# Patient Record
Sex: FEMALE | Race: WHITE | Employment: OTHER | URBAN - NONMETROPOLITAN AREA
[De-identification: names, ages, dates, MRNs, and addresses within clinical notes are randomized per-mention and may not be internally consistent; named-entity substitution may affect disease eponyms.]

---

## 2017-01-27 ENCOUNTER — TRANSFERRED RECORDS (OUTPATIENT)
Dept: HEALTH INFORMATION MANAGEMENT | Facility: HOSPITAL | Age: 37
End: 2017-01-27

## 2017-07-08 ENCOUNTER — HEALTH MAINTENANCE LETTER (OUTPATIENT)
Age: 37
End: 2017-07-08

## 2017-07-20 ENCOUNTER — OFFICE VISIT (OUTPATIENT)
Dept: FAMILY MEDICINE | Facility: OTHER | Age: 37
End: 2017-07-20
Attending: NURSE PRACTITIONER
Payer: MEDICARE

## 2017-07-20 VITALS
WEIGHT: 126 LBS | BODY MASS INDEX: 23.42 KG/M2 | HEART RATE: 80 BPM | TEMPERATURE: 97.6 F | SYSTOLIC BLOOD PRESSURE: 100 MMHG | DIASTOLIC BLOOD PRESSURE: 60 MMHG | RESPIRATION RATE: 14 BRPM

## 2017-07-20 DIAGNOSIS — B35.4 RINGWORM OF BODY: Primary | ICD-10-CM

## 2017-07-20 DIAGNOSIS — L30.9 ECZEMA, UNSPECIFIED TYPE: ICD-10-CM

## 2017-07-20 PROCEDURE — 99212 OFFICE O/P EST SF 10 MIN: CPT

## 2017-07-20 PROCEDURE — 99213 OFFICE O/P EST LOW 20 MIN: CPT | Performed by: NURSE PRACTITIONER

## 2017-07-20 RX ORDER — KETOCONAZOLE 20 MG/G
CREAM TOPICAL 2 TIMES DAILY
Qty: 60 G | Refills: 1 | Status: SHIPPED | OUTPATIENT
Start: 2017-07-20 | End: 2018-01-04

## 2017-07-20 ASSESSMENT — ANXIETY QUESTIONNAIRES
3. WORRYING TOO MUCH ABOUT DIFFERENT THINGS: MORE THAN HALF THE DAYS
IF YOU CHECKED OFF ANY PROBLEMS ON THIS QUESTIONNAIRE, HOW DIFFICULT HAVE THESE PROBLEMS MADE IT FOR YOU TO DO YOUR WORK, TAKE CARE OF THINGS AT HOME, OR GET ALONG WITH OTHER PEOPLE: VERY DIFFICULT
5. BEING SO RESTLESS THAT IT IS HARD TO SIT STILL: SEVERAL DAYS
6. BECOMING EASILY ANNOYED OR IRRITABLE: SEVERAL DAYS
7. FEELING AFRAID AS IF SOMETHING AWFUL MIGHT HAPPEN: SEVERAL DAYS
1. FEELING NERVOUS, ANXIOUS, OR ON EDGE: MORE THAN HALF THE DAYS
2. NOT BEING ABLE TO STOP OR CONTROL WORRYING: SEVERAL DAYS
4. TROUBLE RELAXING: SEVERAL DAYS
GAD7 TOTAL SCORE: 9

## 2017-07-20 NOTE — MR AVS SNAPSHOT
After Visit Summary   7/20/2017    Milla Bose    MRN: [id number]           Patient Information     Date Of Birth          1980        Visit Information        Provider Department      7/20/2017 11:30 AM Aileen Walton NP [facility]        Today's Diagnoses     Ringworm of body    -  1    Eczema, unspecified type          Care Instructions      ASSESSMENT/PLAN:       1. Ringworm of body  symptomatic  - ketoconazole (NIZORAL) 2 % cream; Apply topically 2 times daily  Dispense: 60 g; Refill: 1    2. Eczema, unspecified type  Gentle body wash  CeraVe tub lotion        FUTURE APPOINTMENTS:       - Follow-up visit as needed    Aileen Walton NP  [facility]    Ringworm of the Skin    Ringworm is a fungal infection of the skin. Despite the name, a worm doesn't cause it. The cause of ringworm is a fungus that infects the outer layers of the skin. It is also not caused by bed bugs, scabies, or lice. These are totally different.  The medical term for ringworm is tinea. It can affect most parts of your body, although it seems to do better in moist areas of the body and around hair. It can be on almost any part of your body, including:    Arms, hands, legs, chest, feet, and back    Scalp    Beard    Groin    Between the toes  Depending on where it is located, sometimes the name changes:    Tinea capitis (scalp)    Tinea cruris (groin)    Tinea corporis (body)    Tinea pedis (feet)  Causes  Ringworm is very common all over the world, including the [place] It can take less than 1 week up to 2 weeks before you develop the infection after being exposed. So, you may not figure out the exact cause.  It is spread through direct contact with:    An infected person or animal    Infected soil, or objects such as towels, clothing, and enriquez  Symptoms  At first you might not notice ringworm. Or you may just see a small, red, often raised itchy spot or pimple. Sometimes  there may only be one spot. At other times there may be several. Ringworm can look slightly different on different parts of the body, but there are some things are always present:    Irregular, round, oval or ring-shaped, which is why it's called ringworm    Clearer or lighter color at the center, since it spreads from the center of the spot outward    Red or inflamed look    Raised    Itchy    Scaly, dry, or flaky  Home care  Follow these tips to help care for yourself at home:    Leave it alone. Don't scratch at the rash or pick it. This can increase the chance of infection and scarring.    Take medicine as prescribed. If you were prescribed a cream, apply it exactly as directed. Make sure to put the cream not just on the rash, but also on the skin 1 or 2 inches around it. Medicine by mouth is sometimes needed, particularly for ringworm on the scalp. Take it as directed and until your healthcare provider says to stop.    Keep it from spreading to others. Untreated ringworm of the skin is contagious by skin-to-skin contact. Your child may return to school 2 days after treatment has started.  Prevention  To some degree, prevention depends on what part of your body was affected. In general, the following good hygiene can help.    Clean up after you get dirty or sweaty, or after using a locker room.    When possible, don t share enriquez and brushes.    Avoid having your skin and feet wet or damp for long periods.    Wear clean, loose-fitting underwear.  Follow-up care  Follow up with your healthcare provider as advised by our staff if the rash does not improve after 10 days of treatment or if the rash spreads to other areas of the body.  When to seek medical advice  Call your healthcare provider right away if any of these occur:    Redness around the rash gets worse    Fluid drains from the rash    Fever of 100.4 F (38 C) or higher, or as directed by your healthcare provider  Date Last Reviewed: 8/1/2016 2000-2017  [employer]. [address] [place] [address], [place] [address]. All rights reserved. This information is not intended as a substitute for professional medical care. Always follow your healthcare professional's instructions.        Atopic Dermatitis (Adult)  Atopic dermatitis is a dry, itchy, red rash. It s also called eczema. The rash is chronic, or ongoing. It can come and go over time. The disease is often passed down in families. It causes a problem with the skin barrier that makes the skin more sensitive to the environment and other factors. The increased skin sensitivity causes an itch, which causes scratching. Scratching can worsen the itching or also break the skin. This can put the skin at risk of infection.  The condition is most common in people with asthma, hay fever, hives, or dry or sensitive skin. The rash may be caused by extreme heat or heavy sweating. Skin irritants can cause the rash to flare up. These can include wool or silk clothing, grease, oils, some medicines, and harsh soaps and detergents. Emotional stress can also be a trigger.  Treatment is done to relieve the itching and inflammation of the skin.  Home care  Follow these tips to care for your condition:    Keep the areas of rash clean by bathing at least every other day. Use lukewarm water to bathe. Don t use hot water, which can dry out the skin.    Don t use soaps with strong detergents. Use mild soaps made for sensitive skin.    Apply a cream or ointment to damp skin right after bathing.    Avoid things that irritate your skin. Wear absorbent, soft fabrics next to the skin rather than rough or scratchy materials.    Use mild laundry soap free of scents and perfumes. Make sure to rinse all the soap out of your clothes.    Treat any skin infection as directed.    Use oral diphenhydramine to help reduce itching. This is an antihistamine you can buy at drug and grocery stores. It can make you sleepy, so use lower doses during the  daytime. Or you can use loratadine. This is an antihistamine that will not make you sleepy. Do not use diphenhydramine if you have glaucoma or have trouble urinating due to an enlarged prostate.  Follow-up care  See your healthcare provider, or as advised. If your symptoms don t get better or if they get worse in the next 7 days, make an appointment with your healthcare provider.  When to seek medical advice  Call your healthcare provider right away  if any of these occur:    Increasing area of redness or pain in the skin    Yellow crusts or wet drainage from the rash    Fever of 100.4 F (38 C) or higher, or as directed by your healthcare provider  Date Last Reviewed: 9/1/2016 2000-2017 [employer]. [address] [place] [address], [place] [address]. All rights reserved. This information is not intended as a substitute for professional medical care. Always follow your healthcare professional's instructions.                Follow-ups after your visit        Who to contact     If you have questions or need follow up information about today's clinic visit or your schedule please contact [facility] directly at [phone].  Normal or non-critical lab and imaging results will be communicated to you by MyChart, letter or phone within 4 business days after the clinic has received the results. If you do not hear from us within 7 days, please contact the clinic through [employer] ([email or web address])t or phone. If you have a critical or abnormal lab result, we will notify you by phone as soon as possible.  Submit refill requests through [employer] or call your pharmacy and they will forward the refill request to us. Please allow 3 business days for your refill to be completed.          Additional Information About Your Visit        [employer] Information     [employer] gives you secure access to your electronic health record. If you see a primary care provider, you can also send messages to your care team and make appointments. If you  have questions, please call your primary care clinic.  If you do not have a primary care provider, please call [phone] and they will assist you.        Care EveryWhere ID     This is your Care EveryWhere ID. This could be used by other organizations to access your [facility] medical records  [id number]        Your Vitals Were     Pulse Temperature Respirations Last Period BMI (Body Mass Index)       80 97.6  F (36.4  C) 14 07/10/2017 23.42 kg/m2        Blood Pressure from Last 3 Encounters:   07/20/17 100/60   03/11/16 90/62   12/21/15 (!) 88/54    Weight from Last 3 Encounters:   07/20/17 126 lb (57.2 kg)   03/11/16 118 lb (53.5 kg)   12/21/15 106 lb (48.1 kg)              Today, you had the following     No orders found for display         Today's Medication Changes          These changes are accurate as of: 7/20/17 12:09 PM.  If you have any questions, ask your nurse or doctor.               Start taking these medicines.        Dose/Directions    ketoconazole 2 % cream   Commonly known as:  NIZORAL   Used for:  Ringworm of body   Started by:  Aileen Walton NP        Apply topically 2 times daily   Quantity:  60 g   Refills:  1            Where to get your medicines      These medications were sent to [facility] [address] [place] [address]  AT [facility] [address]  [address] [facility] [address]     Phone:  [phone]     ketoconazole 2 % cream                Primary Care Provider Office Phone # Fax #    Bonny Valdes [phone]       [facility] [address] [facility] [address]        Equal Access to Services     AMANDA ROONEY AH: Ai Russell, watanner luian, tino kaalmada amado, mackenzie fam. So [facility] [phone].    ATENCIÓN: Si habla español, tiene a morgan disposición servicios gratuitos de asistencia lingüística. Llame al [phone].    We comply with  applicable federal civil rights laws and [place] laws. We do not discriminate on the basis of race, color, national origin, age, disability sex, sexual orientation or gender identity.            Thank you!     Thank you for choosing [facility]  for your care. Our goal is always to provide you with excellent care. Hearing back from our patients is one way we can continue to improve our services. Please take a few minutes to complete the written survey that you may receive in the mail after your visit with us. Thank you!             Your Updated Medication List - Protect others around you: Learn how to safely use, store and throw away your medicines at www.disposemymeds.org.          This list is accurate as of: 7/20/17 12:09 PM.  Always use your most recent med list.                   Brand Name Dispense Instructions for use Diagnosis    ADDERALL PO      Take 20 mg by mouth 2 times daily        GABAPENTIN PO      Take 300 mg by mouth 3 times daily        ibuprofen 800 MG tablet    ADVIL/MOTRIN    100 tablet    Take 1 tablet (800 mg) by mouth every 8 hours as needed for moderate pain    Hematoma       ketoconazole 2 % cream    NIZORAL    60 g    Apply topically 2 times daily    Ringworm of body       TRILEPTAL PO      Take 300 mg by mouth 2 times daily        TYLENOL PO           XANAX PO      Take 1 mg by mouth 4 times daily

## 2017-07-20 NOTE — PATIENT INSTRUCTIONS
ASSESSMENT/PLAN:       1. Ringworm of body  symptomatic  - ketoconazole (NIZORAL) 2 % cream; Apply topically 2 times daily  Dispense: 60 g; Refill: 1    2. Eczema, unspecified type  Gentle body wash  CeraVe tub lotion        FUTURE APPOINTMENTS:       - Follow-up visit as needed    Aileen Walton NP  [facility]    Ringworm of the Skin    Ringworm is a fungal infection of the skin. Despite the name, a worm doesn't cause it. The cause of ringworm is a fungus that infects the outer layers of the skin. It is also not caused by bed bugs, scabies, or lice. These are totally different.  The medical term for ringworm is tinea. It can affect most parts of your body, although it seems to do better in moist areas of the body and around hair. It can be on almost any part of your body, including:    Arms, hands, legs, chest, feet, and back    Scalp    Beard    Groin    Between the toes  Depending on where it is located, sometimes the name changes:    Tinea capitis (scalp)    Tinea cruris (groin)    Tinea corporis (body)    Tinea pedis (feet)  Causes  Ringworm is very common all over the world, including the [place] It can take less than 1 week up to 2 weeks before you develop the infection after being exposed. So, you may not figure out the exact cause.  It is spread through direct contact with:    An infected person or animal    Infected soil, or objects such as towels, clothing, and enriquez  Symptoms  At first you might not notice ringworm. Or you may just see a small, red, often raised itchy spot or pimple. Sometimes there may only be one spot. At other times there may be several. Ringworm can look slightly different on different parts of the body, but there are some things are always present:    Irregular, round, oval or ring-shaped, which is why it's called ringworm    Clearer or lighter color at the center, since it spreads from the center of the spot outward    Red or inflamed  look    Raised    Itchy    Scaly, dry, or flaky  Home care  Follow these tips to help care for yourself at home:    Leave it alone. Don't scratch at the rash or pick it. This can increase the chance of infection and scarring.    Take medicine as prescribed. If you were prescribed a cream, apply it exactly as directed. Make sure to put the cream not just on the rash, but also on the skin 1 or 2 inches around it. Medicine by mouth is sometimes needed, particularly for ringworm on the scalp. Take it as directed and until your healthcare provider says to stop.    Keep it from spreading to others. Untreated ringworm of the skin is contagious by skin-to-skin contact. Your child may return to school 2 days after treatment has started.  Prevention  To some degree, prevention depends on what part of your body was affected. In general, the following good hygiene can help.    Clean up after you get dirty or sweaty, or after using a locker room.    When possible, don t share enriquez and brushes.    Avoid having your skin and feet wet or damp for long periods.    Wear clean, loose-fitting underwear.  Follow-up care  Follow up with your healthcare provider as advised by our staff if the rash does not improve after 10 days of treatment or if the rash spreads to other areas of the body.  When to seek medical advice  Call your healthcare provider right away if any of these occur:    Redness around the rash gets worse    Fluid drains from the rash    Fever of 100.4 F (38 C) or higher, or as directed by your healthcare provider  Date Last Reviewed: 8/1/2016 2000-2017 [employer]. [address] [place] [address], [place] [address]. All rights reserved. This information is not intended as a substitute for professional medical care. Always follow your healthcare professional's instructions.        Atopic Dermatitis (Adult)  Atopic dermatitis is a dry, itchy, red rash. It s also called eczema. The rash is chronic, or ongoing. It can  come and go over time. The disease is often passed down in families. It causes a problem with the skin barrier that makes the skin more sensitive to the environment and other factors. The increased skin sensitivity causes an itch, which causes scratching. Scratching can worsen the itching or also break the skin. This can put the skin at risk of infection.  The condition is most common in people with asthma, hay fever, hives, or dry or sensitive skin. The rash may be caused by extreme heat or heavy sweating. Skin irritants can cause the rash to flare up. These can include wool or silk clothing, grease, oils, some medicines, and harsh soaps and detergents. Emotional stress can also be a trigger.  Treatment is done to relieve the itching and inflammation of the skin.  Home care  Follow these tips to care for your condition:    Keep the areas of rash clean by bathing at least every other day. Use lukewarm water to bathe. Don t use hot water, which can dry out the skin.    Don t use soaps with strong detergents. Use mild soaps made for sensitive skin.    Apply a cream or ointment to damp skin right after bathing.    Avoid things that irritate your skin. Wear absorbent, soft fabrics next to the skin rather than rough or scratchy materials.    Use mild laundry soap free of scents and perfumes. Make sure to rinse all the soap out of your clothes.    Treat any skin infection as directed.    Use oral diphenhydramine to help reduce itching. This is an antihistamine you can buy at drug and grocery stores. It can make you sleepy, so use lower doses during the daytime. Or you can use loratadine. This is an antihistamine that will not make you sleepy. Do not use diphenhydramine if you have glaucoma or have trouble urinating due to an enlarged prostate.  Follow-up care  See your healthcare provider, or as advised. If your symptoms don t get better or if they get worse in the next 7 days, make an appointment with your healthcare  provider.  When to seek medical advice  Call your healthcare provider right away  if any of these occur:    Increasing area of redness or pain in the skin    Yellow crusts or wet drainage from the rash    Fever of 100.4 F (38 C) or higher, or as directed by your healthcare provider  Date Last Reviewed: 9/1/2016 2000-2017 [employer] [address] [place] [address]. All rights reserved. This information is not intended as a substitute for professional medical care. Always follow your healthcare professional's instructions.

## 2017-07-20 NOTE — PROGRESS NOTES
SUBJECTIVE:                                                    Milla Bose is a 37 year old female who presents to clinic today for the following health issues:  Chief Complaint   Patient presents with     Derm Problem     thinks possible ring worm on belly.  also facial, scalp, feet, other sx's         Rash  Onset: months    Description:   Location: abdomen, buttocks and feet  Character: abdomen is round, scaling with central clearing, buttocks and feet and bumpy, red and itchy.  Itching (Pruritis): as above    Progression of Symptoms:  same    Accompanying Signs & Symptoms:  Fever: no   Body aches or joint pain: no   Sore throat symptoms: no   Recent cold symptoms: no     History:   Previous similar rash: YES- symptoms present for years    Precipitating factors:   Exposure to similar rash: no   New exposures: None   Recent travel: no     Alleviating factors:  Has not tried anything.     Therapies Tried and outcome: as above      Problem list and histories reviewed & adjusted, as indicated.  Additional history: as documented    Patient Active Problem List   Diagnosis     Schizoaffective disorder (H)     PTSD (post-traumatic stress disorder)     Anxiety state     Panic disorder with agoraphobia     Past Surgical History:   Procedure Laterality Date     cyst removal left breast       cyst removed from right ear       GYN SURGERY      laser of cervix       Social History   Substance Use Topics     Smoking status: Current Every Day Smoker     Packs/day: 0.50     Years: 20.00     Types: Cigarettes     Smokeless tobacco: Never Used     Alcohol use Yes      Comment: rare     Family History   Problem Relation Age of Onset     HEART DISEASE Mother      Alcohol/Drug Mother      Unknown/Adopted Father      CANCER Paternal Grandmother      Cervical         Current Outpatient Prescriptions   Medication Sig Dispense Refill     ketoconazole (NIZORAL) 2 % cream Apply topically 2 times daily 60 g 1     ibuprofen (ADVIL,MOTRIN)  800 MG tablet Take 1 tablet (800 mg) by mouth every 8 hours as needed for moderate pain 100 tablet 0     OXcarbazepine (TRILEPTAL PO) Take 300 mg by mouth 2 times daily       ALPRAZolam (XANAX PO) Take 1 mg by mouth 4 times daily       GABAPENTIN PO Take 300 mg by mouth 3 times daily       Amphetamine-Dextroamphetamine (ADDERALL PO) Take 20 mg by mouth 2 times daily       Acetaminophen (TYLENOL PO)        Allergies   Allergen Reactions     Codeine GI Disturbance     Recent Labs   Lab Test  09/10/15   1101   ALT  66*      BP Readings from Last 3 Encounters:   07/20/17 100/60   03/11/16 90/62   12/21/15 (!) 88/54    Wt Readings from Last 3 Encounters:   07/20/17 126 lb (57.2 kg)   03/11/16 118 lb (53.5 kg)   12/21/15 106 lb (48.1 kg)                          Reviewed and updated as needed this visit by clinical staffTobacco  Allergies  Meds  Med Hx  Surg Hx  Fam Hx  Soc Hx      Reviewed and updated as needed this visit by Provider         ROS:  Constitutional, HEENT, cardiovascular, pulmonary, gi and gu systems are negative, except as otherwise noted.      OBJECTIVE:   /60 (BP Location: Right arm, Patient Position: Chair, Cuff Size: Adult Regular)  Pulse 80  Temp 97.6  F (36.4  C)  Resp 14  Wt 126 lb (57.2 kg)  LMP 07/10/2017  BMI 23.42 kg/m2  Body mass index is 23.42 kg/(m^2).  GENERAL: healthy, alert and no distress  RESP: lungs clear to auscultation - no rales, rhonchi or wheezes  CV: regular rate and rhythm, normal S1 S2, no S3 or S4, no murmur, click or rub, no peripheral edema and peripheral pulses strong  MS: no gross musculoskeletal defects noted, no edema  SKIN: one isolated round lesion that has central clearing and scales on leading edge.  Buttocks scattered raised erythematous macular lesions.  Feet are very dry and cracked.    PSYCH: mentation appears normal, affect normal/bright    ASSESSMENT/PLAN:       1. Ringworm of body  symptomatic  - ketoconazole (NIZORAL) 2 % cream; Apply  topically 2 times daily  Dispense: 60 g; Refill: 1    2. Eczema, unspecified type  Gentle body wash  CeraVe tub lotion    Handout on each provided that includes home care and treatment options.     FUTURE APPOINTMENTS:       - Follow-up visit as needed    Aileen Walton NP  [facility]

## 2017-07-20 NOTE — NURSING NOTE
"Chief Complaint   Patient presents with     Derm Problem     thinks possible ring worm on belly.  also facial, scalp, feet, other sx's       Initial /60 (BP Location: Right arm, Patient Position: Chair, Cuff Size: Adult Regular)  Pulse 80  Temp 97.6  F (36.4  C)  Resp 14  Wt 126 lb (57.2 kg)  LMP 07/10/2017  BMI 23.42 kg/m2 Estimated body mass index is 23.42 kg/(m^2) as calculated from the following:    Height as of 3/11/16: 5' 1.5\" (1.562 m).    Weight as of this encounter: 126 lb (57.2 kg).  Medication Reconciliation: complete     Kim Roger      "

## 2017-07-21 ASSESSMENT — ANXIETY QUESTIONNAIRES: GAD7 TOTAL SCORE: 9

## 2017-07-21 ASSESSMENT — PATIENT HEALTH QUESTIONNAIRE - PHQ9: SUM OF ALL RESPONSES TO PHQ QUESTIONS 1-9: 7

## 2018-01-03 ENCOUNTER — NURSE TRIAGE (OUTPATIENT)
Dept: NURSING | Facility: CLINIC | Age: 38
End: 2018-01-03

## 2018-01-04 ENCOUNTER — OFFICE VISIT (OUTPATIENT)
Dept: OBGYN | Facility: OTHER | Age: 38
End: 2018-01-04
Attending: ADVANCED PRACTICE MIDWIFE
Payer: MEDICARE

## 2018-01-04 ENCOUNTER — TELEPHONE (OUTPATIENT)
Dept: OBGYN | Facility: OTHER | Age: 38
End: 2018-01-04

## 2018-01-04 VITALS
DIASTOLIC BLOOD PRESSURE: 74 MMHG | HEART RATE: 76 BPM | HEIGHT: 61 IN | SYSTOLIC BLOOD PRESSURE: 110 MMHG | BODY MASS INDEX: 25.11 KG/M2 | WEIGHT: 133 LBS

## 2018-01-04 DIAGNOSIS — N93.9 ABNORMAL UTERINE BLEEDING: Primary | ICD-10-CM

## 2018-01-04 DIAGNOSIS — N92.0 MENORRHAGIA WITH REGULAR CYCLE: ICD-10-CM

## 2018-01-04 DIAGNOSIS — Z20.828 CONTACT WITH OR EXPOSURE TO VIRAL DISEASE: ICD-10-CM

## 2018-01-04 DIAGNOSIS — N92.0 EXCESSIVE OR FREQUENT MENSTRUATION: ICD-10-CM

## 2018-01-04 LAB
ERYTHROCYTE [DISTWIDTH] IN BLOOD BY AUTOMATED COUNT: 12.6 % (ref 10–15)
HCG UR QL: NEGATIVE
HCT VFR BLD AUTO: 40.8 % (ref 35–47)
HGB BLD-MCNC: 13.4 G/DL (ref 11.7–15.7)
MCH RBC QN AUTO: 29.3 PG (ref 26.5–33)
MCHC RBC AUTO-ENTMCNC: 32.8 G/DL (ref 31.5–36.5)
MCV RBC AUTO: 89 FL (ref 78–100)
PLATELET # BLD AUTO: 230 10E9/L (ref 150–450)
RBC # BLD AUTO: 4.57 10E12/L (ref 3.8–5.2)
SPECIMEN SOURCE: NORMAL
WBC # BLD AUTO: 9 10E9/L (ref 4–11)
WET PREP SPEC: NORMAL

## 2018-01-04 PROCEDURE — 99203 OFFICE O/P NEW LOW 30 MIN: CPT | Performed by: ADVANCED PRACTICE MIDWIFE

## 2018-01-04 PROCEDURE — 85246 CLOT FACTOR VIII VW ANTIGEN: CPT | Mod: ZL | Performed by: ADVANCED PRACTICE MIDWIFE

## 2018-01-04 PROCEDURE — 85610 PROTHROMBIN TIME: CPT | Mod: ZL | Performed by: ADVANCED PRACTICE MIDWIFE

## 2018-01-04 PROCEDURE — 87210 SMEAR WET MOUNT SALINE/INK: CPT | Mod: ZL | Performed by: ADVANCED PRACTICE MIDWIFE

## 2018-01-04 PROCEDURE — 36415 COLL VENOUS BLD VENIPUNCTURE: CPT | Mod: ZL | Performed by: ADVANCED PRACTICE MIDWIFE

## 2018-01-04 PROCEDURE — 85027 COMPLETE CBC AUTOMATED: CPT | Mod: ZL | Performed by: ADVANCED PRACTICE MIDWIFE

## 2018-01-04 PROCEDURE — 00000401 ZZHCL STATISTIC THROMBIN TIME NC: Mod: ZL | Performed by: ADVANCED PRACTICE MIDWIFE

## 2018-01-04 PROCEDURE — G0463 HOSPITAL OUTPT CLINIC VISIT: HCPCS | Performed by: ADVANCED PRACTICE MIDWIFE

## 2018-01-04 PROCEDURE — 81025 URINE PREGNANCY TEST: CPT | Mod: ZL | Performed by: ADVANCED PRACTICE MIDWIFE

## 2018-01-04 PROCEDURE — 84443 ASSAY THYROID STIM HORMONE: CPT | Mod: ZL | Performed by: ADVANCED PRACTICE MIDWIFE

## 2018-01-04 PROCEDURE — 87591 N.GONORRHOEAE DNA AMP PROB: CPT | Mod: ZL | Performed by: ADVANCED PRACTICE MIDWIFE

## 2018-01-04 PROCEDURE — 85245 CLOT FACTOR VIII VW RISTOCTN: CPT | Mod: ZL | Performed by: ADVANCED PRACTICE MIDWIFE

## 2018-01-04 PROCEDURE — 87624 HPV HI-RISK TYP POOLED RSLT: CPT | Mod: ZL | Performed by: ADVANCED PRACTICE MIDWIFE

## 2018-01-04 PROCEDURE — G0123 SCREEN CERV/VAG THIN LAYER: HCPCS | Mod: GA,ZL | Performed by: ADVANCED PRACTICE MIDWIFE

## 2018-01-04 PROCEDURE — 85730 THROMBOPLASTIN TIME PARTIAL: CPT | Mod: GA,ZL | Performed by: ADVANCED PRACTICE MIDWIFE

## 2018-01-04 PROCEDURE — 85240 CLOT FACTOR VIII AHG 1 STAGE: CPT | Mod: ZL | Performed by: ADVANCED PRACTICE MIDWIFE

## 2018-01-04 PROCEDURE — 88142 CYTOPATH C/V THIN LAYER: CPT | Performed by: ADVANCED PRACTICE MIDWIFE

## 2018-01-04 PROCEDURE — G0476 HPV COMBO ASSAY CA SCREEN: HCPCS | Mod: ZL | Performed by: ADVANCED PRACTICE MIDWIFE

## 2018-01-04 PROCEDURE — 87491 CHLMYD TRACH DNA AMP PROBE: CPT | Mod: ZL | Performed by: ADVANCED PRACTICE MIDWIFE

## 2018-01-04 RX ORDER — PENICILLIN V POTASSIUM 250 MG/5ML
250 SOLUTION, RECONSTITUTED, ORAL ORAL 4 TIMES DAILY
COMMUNITY
End: 2018-01-09

## 2018-01-04 RX ORDER — NORETHINDRONE ACETATE 5 MG
5 TABLET ORAL 3 TIMES DAILY
COMMUNITY
End: 2018-01-09

## 2018-01-04 RX ORDER — NORETHINDRONE ACETATE 5 MG
5 TABLET ORAL 3 TIMES DAILY
Qty: 15 TABLET | Refills: 0 | Status: CANCELLED | OUTPATIENT
Start: 2018-01-04

## 2018-01-04 NOTE — TELEPHONE ENCOUNTER
11:25 AM    Reason for Call: OVERBOOK    Patient is having the following symptoms: Heavy period, with blood clots, were bigger than a quarter and are now smaller and streaks. Patient is using a super plus tampon every hour or sooner. for 1 days.    The patient is requesting an appointment for Schedule an appt with Bernardo.    Was an appointment offered for this call? PCP is out and family practice is full.   If yes : Appointment type              Date    Preferred method for responding to this message: Telephone Call  What is your phone number ?    If we cannot reach you directly, may we leave a detailed response at the number you provided? Yes    Can this message wait until your PCP/provider returns, if unavailable today? Not applicable,     Jayna Garcia

## 2018-01-04 NOTE — PATIENT INSTRUCTIONS
Return to office next week for follow up.    Thank you for allowing Mario PAULINO CNM and our OB team to participate in your care.  If you have a scheduling or an appointment question please contact [facility] Coordinator at their direct line [phone].   ALL nursing questions or concerns can be directed to your OB nurse at: [phone] - Chelsie

## 2018-01-04 NOTE — PROGRESS NOTES
"Milla Bose is a 37 year old female  Here today for menorrhagia.  Pt reports heavy bleeding with clots all night.  Pt was bleeding through a Super-plus tampon and pad within an hour since 7 p.m. Last night.  Pt reports having a SAB seven years ago and felt this was the same type of bleeding.  Does not use condoms.      Cycle:  28 days  Bleed:  5-7 days with 3 heavy (not heavy like today)  Pain:  Up to 10/10.  How old when started:  14 yo  Contraception:  BTO    G 3 P 2   x two  Last Pap a year or more      O:   /74  Pulse 76  Ht 5' 1\" (1.549 m)  Wt 133 lb (60.3 kg)  BMI 25.13 kg/m2   Pleasant without acute distress  Pelvic:  Vagina and vulva are normal;  Moderate amount ofrubra discharge is noted.    Cervix: normal without lesions.    Uterus:  Retroflexed and mobile, normal in size and shape without tenderness.  Adnexa: without masses or tenderness.    A:  Menorrhagia for past 20 hours  Hx of bilateral tubal occlusion  Exposure  Never uses condoms  Refuses pelvic ultrasound  HCG negative    P:    Urine hCG qualitative  Pap with HPV  HCG qualitative  STI screening:  Wet prep, GC/CT, HIV, Hep B & C, Syphilis  Bleeding labs:  CBC, TSH, PTT, INR, Factor 8  Pelvic exam  Pelvic US   Norethindrone 5 mg tab by mouth three times a day x 5 days.  Instructed:  Seek medical attention if bleeding continues, light-headedness, or other symptoms.    RTO next week Monday or Tuesday to assess bleeding, review labs, and options and as needed    Greater than 30 minutes were spent face to face counseling this patient    JEFFERSON Alex, FACUNDO    "

## 2018-01-04 NOTE — NURSING NOTE
"Chief Complaint   Patient presents with     Abnormal Uterine Bleeding       Initial /74  Pulse 76  Ht 5' 1\" (1.549 m)  Wt 133 lb (60.3 kg)  BMI 25.13 kg/m2 Estimated body mass index is 25.13 kg/(m^2) as calculated from the following:    Height as of this encounter: 5' 1\" (1.549 m).    Weight as of this encounter: 133 lb (60.3 kg).  Medication Reconciliation: mandi Pathak      "
nothing

## 2018-01-04 NOTE — TELEPHONE ENCOUNTER
Additional Information    Negative: SEVERE abdominal pain    Negative: SEVERE dizziness (e.g., unable to stand, requires support to walk, feels like passing out now)    Negative: MODERATE vaginal bleeding (i.e., soaking 1 pad or tampon per hour and present > 6 hours)    Negative: [1] Constant abdominal pain AND [2] present > 2 hours    Negative: Pale skin (pallor) of new onset or worsening    Negative: Passed tissue (e.g., gray-white)    Negative: Taking Coumadin (warfarin) or other strong blood thinner, or known bleeding disorder (e.g., thrombocytopenia)    Negative: [1] Skin bruises or nosebleed AND [2] not caused by an injury    Negative: [1] Periods with > 6 soaked pads or tampons per day AND [2] last > 7 days    Negative: [1] Bleeding or spotting after procedure (e.g., biopsy) or pelvic examination (e.g., pap smear) AND [2] lasts > 7 days    Negative: [1] Bleeding or spotting occurs after sex (Exception: first intercourse) AND [2] lasts > 7 days    [1] Menstrual cycle < 21 days OR > 35 days AND [2] has occurred once this past year   (all triage questions negative)    Protocols used: VAGINAL BLEEDING - ABNORMAL-ADULT-

## 2018-01-04 NOTE — MR AVS SNAPSHOT
After Visit Summary   1/4/2018    Milla Bose    MRN: [id number]           Patient Information     Date Of Birth          1980        Visit Information        Provider Department      1/4/2018 3:00 PM Mario Chang APRN CNM [facility]        Today's Diagnoses     Abnormal uterine bleeding    -  1    Excessive or frequent menstruation        Menorrhagia          Care Instructions    Return to office next week for follow up.    Thank you for allowing Mairo PAULINO CNM and our OB team to participate in your care.  If you have a scheduling or an appointment question please contact [facility] Health Unit Coordinator at their direct line [phone].   ALL nursing questions or concerns can be directed to your OB nurse at: [phone] - leah              Follow-ups after your visit        Who to contact     If you have questions or need follow up information about today's clinic visit or your schedule please contact [facility] directly at [phone].  Normal or non-critical lab and imaging results will be communicated to you by BitXhart, letter or phone within 4 business days after the clinic has received the results. If you do not hear from us within 7 days, please contact the clinic through Activi[employer] or phone. If you have a critical or abnormal lab result, we will notify you by phone as soon as possible.  Submit refill requests through MEEP or call your pharmacy and they will forward the refill request to us. Please allow 3 business days for your refill to be completed.          Additional Information About Your Visit        MyChart Information     MEEP gives you secure access to your electronic health record. If you see a primary care provider, you can also send messages to your care team and make appointments. If you have questions, please call your primary care clinic.  If you do not have a primary care provider, please call [phone] and they will  "assist you.        Care EveryWhere ID     This is your Care EveryWhere ID. This could be used by other organizations to access your [place] medical records  [id number]        Your Vitals Were     Pulse Height BMI (Body Mass Index)             76 5' 1\" (1.549 m) 25.13 kg/m2          Blood Pressure from Last 3 Encounters:   01/04/18 110/74   07/20/17 100/60   03/11/16 90/62    Weight from Last 3 Encounters:   01/04/18 133 lb (60.3 kg)   07/20/17 126 lb (57.2 kg)   03/11/16 118 lb (53.5 kg)              We Performed the Following     A pap thin layer screen with  HPV - recommended age 30 - 65 years (select HPV order below)     CBC with platelets     Factor 8 assay     GC/Chlamydia by PCR - HI,[facility]     HCG qualitative urine - CSC and Range     HPV High Risk Types DNA Cervical     INR     PARTIAL THROMBOPLASTIN TIME     TSH     Von Willebrand antigen     von Willebrand Factor Activity     Wet prep        Primary Care Provider Office Phone # Fax #    Bonny Valdes [phone] [facility] [address]        Equal Access to Services     [facility] : Hadii aad ku hadashbrigitte Solaura, waaxda luqadaha, qaybta kaalmanayely fischer, mackenzie fam. So [facility] [phone].    ATENCIÓN: Si habla español, tiene a morgan disposición servicios gratuitos de asistencia lingüística. Samantha al [phone].    We comply with applicable federal civil rights laws and [place] laws. We do not discriminate on the basis of race, color, national origin, age, disability, sex, sexual orientation, or gender identity.            Thank you!     Thank you for choosing [facility]  for your care. Our goal is always to provide you with excellent care. Hearing back from our patients is one way we can continue to improve our services. Please take a few minutes to complete the written survey that you may receive in the mail after your visit with us. Thank you!           "   Your Updated Medication List - Protect others around you: Learn how to safely use, store and throw away your medicines at www.disposemymeds.org.          This list is accurate as of: 1/4/18  5:17 PM.  Always use your most recent med list.                   Brand Name Dispense Instructions for use Diagnosis    ADDERALL PO      Take 20 mg by mouth 2 times daily        GABAPENTIN PO      Take 300 mg by mouth 3 times daily        ibuprofen 800 MG tablet    ADVIL/MOTRIN    100 tablet    Take 1 tablet (800 mg) by mouth every 8 hours as needed for moderate pain    Hematoma       norethindrone 5 MG tablet    AYGESTIN     Take 5 mg by mouth 3 times daily        penicillin V 250 mg/5 mL suspension    VEETID     Take 250 mg by mouth 4 times daily        TRILEPTAL PO      Take 300 mg by mouth 2 times daily        XANAX PO      Take 1 mg by mouth 4 times daily

## 2018-01-05 LAB
APTT PPP: 35 SEC (ref 24–37)
C TRACH DNA SPEC QL PROBE+SIG AMP: NOT DETECTED
INR PPP: 0.97 (ref 0.8–1.2)
N GONORRHOEA DNA SPEC QL PROBE+SIG AMP: NOT DETECTED
SPECIMEN SOURCE: NORMAL
TSH SERPL DL<=0.005 MIU/L-ACNC: 2.82 MU/L (ref 0.4–4)

## 2018-01-09 ENCOUNTER — OFFICE VISIT (OUTPATIENT)
Dept: OBGYN | Facility: OTHER | Age: 38
End: 2018-01-09
Attending: ADVANCED PRACTICE MIDWIFE
Payer: MEDICARE

## 2018-01-09 VITALS
DIASTOLIC BLOOD PRESSURE: 68 MMHG | BODY MASS INDEX: 25.11 KG/M2 | HEIGHT: 61 IN | OXYGEN SATURATION: 99 % | WEIGHT: 133 LBS | SYSTOLIC BLOOD PRESSURE: 110 MMHG | HEART RATE: 105 BPM

## 2018-01-09 DIAGNOSIS — N93.9 ABNORMAL UTERINE BLEEDING (AUB): ICD-10-CM

## 2018-01-09 DIAGNOSIS — N92.0 MENORRHAGIA WITH REGULAR CYCLE: Primary | ICD-10-CM

## 2018-01-09 PROCEDURE — G0463 HOSPITAL OUTPT CLINIC VISIT: HCPCS | Mod: 25

## 2018-01-09 PROCEDURE — 88305 TISSUE EXAM BY PATHOLOGIST: CPT | Mod: TC | Performed by: ADVANCED PRACTICE MIDWIFE

## 2018-01-09 PROCEDURE — 58100 BIOPSY OF UTERUS LINING: CPT | Performed by: ADVANCED PRACTICE MIDWIFE

## 2018-01-09 PROCEDURE — 99214 OFFICE O/P EST MOD 30 MIN: CPT | Mod: 25 | Performed by: ADVANCED PRACTICE MIDWIFE

## 2018-01-09 PROCEDURE — 58300 INSERT INTRAUTERINE DEVICE: CPT | Mod: GY | Performed by: ADVANCED PRACTICE MIDWIFE

## 2018-01-09 ASSESSMENT — PAIN SCALES - GENERAL: PAINLEVEL: NO PAIN (0)

## 2018-01-09 NOTE — MR AVS SNAPSHOT
After Visit Summary   1/9/2018    Milla Bose    MRN: [id number]           Patient Information     Date Of Birth          1980        Visit Information        Provider Department      1/9/2018 3:00 PM Mario Chang APRN CNM [facility]        Today's Diagnoses     Menorrhagia with regular cycle    -  1    Abnormal uterine bleeding (AUB)          Care Instructions    Follow up in 3 weeks.    Thank you for allowing Mario PAULINO CNM and our OB team to participate in your care.  If you have a scheduling or an appointment question please contact [facility] Health Unit Coordinator at their direct line [phone].   ALL nursing questions or concerns can be directed to your OB nurse at: [phone] - leah                  Follow-ups after your visit        Future tests that were ordered for you today     Open Future Orders        Priority Expected Expires Ordered    US Pelvic Complete with Transvaginal Routine 1/9/2018 1/9/2019 1/9/2018            Who to contact     If you have questions or need follow up information about today's clinic visit or your schedule please contact [facility] directly at [phone].  Normal or non-critical lab and imaging results will be communicated to you by MyChart, letter or phone within 4 business days after the clinic has received the results. If you do not hear from us within 7 days, please contact the clinic through Health Guard Biotechhart or phone. If you have a critical or abnormal lab result, we will notify you by phone as soon as possible.  Submit refill requests through [employer] or call your pharmacy and they will forward the refill request to us. Please allow 3 business days for your refill to be completed.          Additional Information About Your Visit        Health Guard Biotechhart Information     [employer] gives you secure access to your electronic health record. If you see a primary care provider, you can also send messages to your care team and make  "appointments. If you have questions, please call your primary care clinic.  If you do not have a primary care provider, please call [phone] and they will assist you.        Care EveryWhere ID     This is your Care EveryWhere ID. This could be used by other organizations to access your [place] medical records  [id number]        Your Vitals Were     Pulse Height Pulse Oximetry BMI (Body Mass Index)          105 5' 1\" (1.549 m) 99% 25.13 kg/m2         Blood Pressure from Last 3 Encounters:   01/09/18 110/68   01/04/18 110/74   07/20/17 100/60    Weight from Last 3 Encounters:   01/09/18 133 lb (60.3 kg)   01/04/18 133 lb (60.3 kg)   07/20/17 126 lb (57.2 kg)              We Performed the Following     ENDOMETRIAL BIOPSY W/O CERVICAL DILATION     HC LEVONORGESTREL IU 52MG 5 YR     INSERTION INTRAUTERINE DEVICE     Surgical pathology exam          Today's Medication Changes          These changes are accurate as of: 1/9/18  5:33 PM.  If you have any questions, ask your nurse or doctor.               Start taking these medicines.        Dose/Directions    levonorgestrel 20 MCG/24HR IUD   Commonly known as:  MIRENA   Used for:  Menorrhagia with regular cycle   Started by:  Mario Chang APRN CNM        Dose:  1 each   1 each (20 mcg) by Intrauterine route continuous   Refills:  0            Where to get your medicines      Some of these will need a paper prescription and others can be bought over the counter.  Ask your nurse if you have questions.     You don't need a prescription for these medications     levonorgestrel 20 MCG/24HR IUD                Primary Care Provider Office Phone # Fax #    Bonny Valdes [phone] [address] [facility] [address]        Equal Access to Services     FLORES ROONEY : Ai Russell, orin cortés, mackenzie mays. So [facility] [phone].    ATENCIÓN: Si habla " español, tiene a morgan disposición servicios gratuitos de asistencia lingüística. Samantha braun [phone].    We comply with applicable federal civil rights laws and [place] laws. We do not discriminate on the basis of race, color, national origin, age, disability, sex, sexual orientation, or gender identity.            Thank you!     Thank you for choosing [facility]  for your care. Our goal is always to provide you with excellent care. Hearing back from our patients is one way we can continue to improve our services. Please take a few minutes to complete the written survey that you may receive in the mail after your visit with us. Thank you!             Your Updated Medication List - Protect others around you: Learn how to safely use, store and throw away your medicines at www.disposemymeds.org.          This list is accurate as of: 1/9/18  5:33 PM.  Always use your most recent med list.                   Brand Name Dispense Instructions for use Diagnosis    ADDERALL PO      Take 20 mg by mouth 2 times daily        GABAPENTIN PO      Take 300 mg by mouth 3 times daily        ibuprofen 800 MG tablet    ADVIL/MOTRIN    100 tablet    Take 1 tablet (800 mg) by mouth every 8 hours as needed for moderate pain    Hematoma       levonorgestrel 20 MCG/24HR IUD    MIRENA     1 each (20 mcg) by Intrauterine route continuous    Menorrhagia with regular cycle       TRILEPTAL PO      Take 300 mg by mouth 2 times daily        XANAX PO      Take 1 mg by mouth 4 times daily

## 2018-01-09 NOTE — PATIENT INSTRUCTIONS
Follow up in 3 weeks.    Thank you for allowing Mario PAULINO CNM and our OB team to participate in your care.  If you have a scheduling or an appointment question please contact [facility] Health Unit Coordinator at their direct line [phone].   ALL nursing questions or concerns can be directed to your OB nurse at: [phone] - Chelsie

## 2018-01-09 NOTE — NURSING NOTE
"Chief Complaint   Patient presents with     Vaginal Bleeding     EMB       Initial /68 (BP Location: Left arm, Patient Position: Chair, Cuff Size: Adult Regular)  Pulse 105  Ht 5' 1\" (1.549 m)  Wt 133 lb (60.3 kg)  SpO2 99%  BMI 25.13 kg/m2 Estimated body mass index is 25.13 kg/(m^2) as calculated from the following:    Height as of this encounter: 5' 1\" (1.549 m).    Weight as of this encounter: 133 lb (60.3 kg).  Medication Reconciliation: mandi Ko      "

## 2018-01-09 NOTE — PROGRESS NOTES
"Milla Bose is a 37 year old female  Here for follow up for menorrhagia.  Pt reports she is only spotting now with the norethindrone.     PROCEDURE:  After informed consent was obtained from the patient, a speculum was placed in the vagina to visualize the cervix.  Tenaculum was applied to the anterior cervical lip. Endometrial biopsy pipelle was passed through the cervical os and tissue obtained.  Uterus sounded to 6 cm.  Tenaculum was removed and sites were hemostatic. There were no complications. The patient tolerated the procedure well with a minimal amount of cramping noted.  Specimen was sent to pathology.    CC:  IUD insertion for bleeding control  HPI:  Milla Bose is a 37 year old female No LMP recorded.  No other c/o.  She is here for an IUD insertion. Patient has verbalized understanding of risks and benefits and has signed the consent form.          Prior ectopic no  Prior CT no    Allergies: Codeine    EXAM:  Blood pressure 110/68, pulse 105, height 5' 1\" (1.549 m), weight 133 lb (60.3 kg), SpO2 99 %, not currently breastfeeding.  General - pleasant female in no acute distress.  Pelvic - External Genitalia: normal adult female; Bartholin,urethral and [place]: within normal limits,   Vagina: well rugated, no discharge,   Cervix: no lesions or Cervical Motion Tenderness, Small < 1 cm polyp noted.  Large > 2 cm Bartholin cyst noted  Uterus: Retroflexed, firm, normal sized and nontender,  Adnexae: no masses or tenderness.    PROCEDURE:  After informed consent was obtained from the patient, a speculum was placed in the vagina to visualized the cervix  Tenaculum was placed at the 12 o'clock.  The Mirena IUD was then placed in the usual fashion.  Strings were clipped about 2-3 cm from the cervical os.  Tenaculum was removed and cervix was hemostatic. There were no complications. The patient tolerated the procedure well.    0/10 went to 0/10 immediately after.  Only tenaculum placement was " 8/10.    ASSESSMENT:  Hx of menorrhagia  Initiated Norethindrone 5 mg TID PO on Thursday/light bleeding and spotting since initiating  Cervical polyp  Bartholin cyst  Hx of BTO  Negative hCG, wet prep, GC/CT on 1/4/18  Desires Mirena IUD fo bleeding control      PLAN:  EMB  Mirena IUD placement  Pelvic ultrasound  Follow up in 3 weeks for IUD surveillance    The patient should feel for the IUD strings after her next menses.  If unable to locate them, she should return to clinic for a speculum examination for confirmation that the IUD is in place. Bleeding pattern of this particular IUD was discussed with the patient. She is aware that the IUD will need to be removed in 5 years or PRN.  She is to return in 3 wks  to clinic and for her next annual or PRN.    Greater than 25 minutes of this 40 minute were spent face to face counseling this patient about EMB and IUD procedure, risks and benefits, cervical polyps, Bartholin cysts, pelvic ultrasound.    Mario Chang, JEFFERSON, CNM

## 2018-01-10 LAB
COPATH REPORT: NORMAL
FACT VIII ACT/NOR PPP: 73 % (ref 55–200)
PAP: NORMAL
VWF CBA/VWF AG PPP IA-RTO: 80 % (ref 50–200)
VWF:AC ACT/NOR PPP IA: 70 % (ref 50–180)

## 2018-01-11 LAB
COPATH REPORT: NORMAL
FINAL DIAGNOSIS: NORMAL
HPV HR 12 DNA CVX QL NAA+PROBE: NEGATIVE
HPV16 DNA SPEC QL NAA+PROBE: NEGATIVE
HPV18 DNA SPEC QL NAA+PROBE: NEGATIVE
SPECIMEN DESCRIPTION: NORMAL

## 2018-02-12 ENCOUNTER — TELEPHONE (OUTPATIENT)
Dept: FAMILY MEDICINE | Facility: OTHER | Age: 38
End: 2018-02-12

## 2018-02-12 NOTE — TELEPHONE ENCOUNTER
11:17 AM    Reason for Call: OVERBOOK    Patient is having the following symptoms: possible strep she would like to see if she could get in with her boys on 02/13/18 at 1:00 pm for 1 weeks.    The patient is requesting an appointment for 02/13/18 with Heather Bustamante.    Was an appointment offered for this call? No  If yes : Appointment type              Date    Preferred method for responding to this message: Telephone Call  What is your phone number ?    If we cannot reach you directly, may we leave a detailed response at the number you provided? Yes    Can this message wait until your PCP/provider returns, if unavailable today? Not applicable, PCP is in     Lyubov Malone

## 2018-02-13 ENCOUNTER — OFFICE VISIT (OUTPATIENT)
Dept: FAMILY MEDICINE | Facility: OTHER | Age: 38
End: 2018-02-13
Attending: NURSE PRACTITIONER
Payer: MEDICARE

## 2018-02-13 VITALS
SYSTOLIC BLOOD PRESSURE: 110 MMHG | OXYGEN SATURATION: 98 % | HEART RATE: 104 BPM | RESPIRATION RATE: 18 BRPM | WEIGHT: 134.4 LBS | DIASTOLIC BLOOD PRESSURE: 76 MMHG | BODY MASS INDEX: 25.37 KG/M2 | HEIGHT: 61 IN | TEMPERATURE: 98.7 F

## 2018-02-13 DIAGNOSIS — J02.9 PHARYNGITIS, UNSPECIFIED ETIOLOGY: Primary | ICD-10-CM

## 2018-02-13 PROBLEM — N92.0 EXCESSIVE OR FREQUENT MENSTRUATION: Status: RESOLVED | Noted: 2018-01-04 | Resolved: 2018-02-13

## 2018-02-13 PROBLEM — Z20.828 CONTACT WITH OR EXPOSURE TO VIRAL DISEASE: Status: RESOLVED | Noted: 2018-01-04 | Resolved: 2018-02-13

## 2018-02-13 LAB
DEPRECATED S PYO AG THROAT QL EIA: NORMAL
DEPRECATED S PYO AG THROAT QL EIA: NORMAL
SPECIMEN SOURCE: NORMAL

## 2018-02-13 PROCEDURE — G0463 HOSPITAL OUTPT CLINIC VISIT: HCPCS

## 2018-02-13 PROCEDURE — 87081 CULTURE SCREEN ONLY: CPT | Mod: ZL | Performed by: NURSE PRACTITIONER

## 2018-02-13 PROCEDURE — 87880 STREP A ASSAY W/OPTIC: CPT | Mod: ZL | Performed by: NURSE PRACTITIONER

## 2018-02-13 PROCEDURE — 99213 OFFICE O/P EST LOW 20 MIN: CPT | Performed by: NURSE PRACTITIONER

## 2018-02-13 ASSESSMENT — ANXIETY QUESTIONNAIRES
1. FEELING NERVOUS, ANXIOUS, OR ON EDGE: MORE THAN HALF THE DAYS
6. BECOMING EASILY ANNOYED OR IRRITABLE: SEVERAL DAYS
7. FEELING AFRAID AS IF SOMETHING AWFUL MIGHT HAPPEN: SEVERAL DAYS
IF YOU CHECKED OFF ANY PROBLEMS ON THIS QUESTIONNAIRE, HOW DIFFICULT HAVE THESE PROBLEMS MADE IT FOR YOU TO DO YOUR WORK, TAKE CARE OF THINGS AT HOME, OR GET ALONG WITH OTHER PEOPLE: SOMEWHAT DIFFICULT
5. BEING SO RESTLESS THAT IT IS HARD TO SIT STILL: NOT AT ALL
4. TROUBLE RELAXING: SEVERAL DAYS
GAD7 TOTAL SCORE: 7
2. NOT BEING ABLE TO STOP OR CONTROL WORRYING: SEVERAL DAYS
3. WORRYING TOO MUCH ABOUT DIFFERENT THINGS: SEVERAL DAYS

## 2018-02-13 ASSESSMENT — PAIN SCALES - GENERAL: PAINLEVEL: SEVERE PAIN (7)

## 2018-02-13 NOTE — MR AVS SNAPSHOT
After Visit Summary   2/13/2018    Milla Bose    MRN: [id number]           Patient Information     Date Of Birth          1980        Visit Information        Provider Department      2/13/2018 1:30 PM Heather Bustamante NP [facility]        Today's Diagnoses     Pharyngitis, unspecified etiology    -  1      Care Instructions    Results for orders placed or performed in visit on 02/13/18 (from the past 24 hour(s))   Strep, Rapid Screen   Result Value Ref Range    Specimen Description Throat     Rapid Strep A Screen       NEGATIVE: No Group A streptococcal antigen detected by immunoassay, await culture report.    Rapid Strep A Screen Internal QC OK          1. Pharyngitis, unspecified etiology  - Strep, Rapid Screen - negative  - Beta strep group A culture - pending      Temp control  To UC or ER as needed  Fluids  Rest  Humidity at home - add bacteriostatic solution to humidifier  OTC Mucinex liquid cough and cold  Add Zicam or other zinc daily until you feel better        Heather Bustamante NP  [facility]            Follow-ups after your visit        Who to contact     If you have questions or need follow up information about today's clinic visit or your schedule please contact [facility] directly at [phone].  Normal or non-critical lab and imaging results will be communicated to you by MyChart, letter or phone within 4 business days after the clinic has received the results. If you do not hear from us within 7 days, please contact the clinic through MyChart or phone. If you have a critical or abnormal lab result, we will notify you by phone as soon as possible.  Submit refill requests through [employer] ([employer]) or call your pharmacy and they will forward the refill request to us. Please allow 3 business days for your refill to be completed.          Additional Information About Your Visit        [employer] Information     [employer] (formerly [employer]) gives you secure access to your  "electronic health record. If you see a primary care provider, you can also send messages to your care team and make appointments. If you have questions, please call your primary care clinic.  If you do not have a primary care provider, please call [phone] and they will assist you.        Care EveryWhere ID     This is your Care EveryWhere ID. This could be used by other organizations to access your [place] medical records  [id number]        Your Vitals Were     Pulse Temperature Respirations Height Last Period Pulse Oximetry    104 98.7  F (37.1  C) (Tympanic) 18 5' 1\" (1.549 m) 02/08/2018 98%    BMI (Body Mass Index)                   25.39 kg/m2            Blood Pressure from Last 3 Encounters:   02/13/18 110/76   01/09/18 110/68   01/04/18 110/74    Weight from Last 3 Encounters:   02/13/18 134 lb 6.4 oz (61 kg)   01/09/18 133 lb (60.3 kg)   01/04/18 133 lb (60.3 kg)              We Performed the Following     Beta strep group A culture     Strep, Rapid Screen        Primary Care Provider Office Phone # Fax #    Bonny Valdes [phone] [facility] [address]        Equal Access to Services     FLORES ROONEY : Hadii sandy ku hadasho Soomaali, waaxda luqadaha, qaybta kaalmada adeegyada, waxay jyotsna fam. So [facility] [phone].    ATENCIÓN: Si habla español, tiene a morgan disposición servicios gratuitos de asistencia lingüística. Llame al [phone].    We comply with applicable federal civil rights laws and [place] laws. We do not discriminate on the basis of race, color, national origin, age, disability, sex, sexual orientation, or gender identity.            Thank you!     Thank you for choosing [facility]  for your care. Our goal is always to provide you with excellent care. Hearing back from our patients is one way we can continue to improve our services. Please take a few minutes to complete the written survey " that you may receive in the mail after your visit with us. Thank you!             Your Updated Medication List - Protect others around you: Learn how to safely use, store and throw away your medicines at www.disposemymeds.org.          This list is accurate as of 2/13/18  2:36 PM.  Always use your most recent med list.                   Brand Name Dispense Instructions for use Diagnosis    ADDERALL PO      Take 20 mg by mouth 2 times daily        GABAPENTIN PO      Take 300 mg by mouth 3 times daily        ibuprofen 800 MG tablet    ADVIL/MOTRIN    100 tablet    Take 1 tablet (800 mg) by mouth every 8 hours as needed for moderate pain    Hematoma       levonorgestrel 20 MCG/24HR IUD    MIRENA     1 each (20 mcg) by Intrauterine route continuous    Menorrhagia with regular cycle       TRILEPTAL PO      Take 300 mg by mouth 2 times daily        XANAX PO      Take 1 mg by mouth 4 times daily

## 2018-02-13 NOTE — NURSING NOTE
"Chief Complaint   Patient presents with     Pharyngitis       Initial /76 (BP Location: Left arm, Patient Position: Chair, Cuff Size: Adult Regular)  Pulse 104  Temp 98.7  F (37.1  C) (Tympanic)  Resp 18  Ht 5' 1\" (1.549 m)  Wt 134 lb 6.4 oz (61 kg)  LMP 02/08/2018  SpO2 98%  BMI 25.39 kg/m2 Estimated body mass index is 25.39 kg/(m^2) as calculated from the following:    Height as of this encounter: 5' 1\" (1.549 m).    Weight as of this encounter: 134 lb 6.4 oz (61 kg).  Medication Reconciliation: complete   ;Pamela M Lechevalier LPN      "

## 2018-02-13 NOTE — PATIENT INSTRUCTIONS
Results for orders placed or performed in visit on 02/13/18 (from the past 24 hour(s))   Strep, Rapid Screen   Result Value Ref Range    Specimen Description Throat     Rapid Strep A Screen       NEGATIVE: No Group A streptococcal antigen detected by immunoassay, await culture report.    Rapid Strep A Screen Internal QC OK          1. Pharyngitis, unspecified etiology  - Strep, Rapid Screen - negative  - Beta strep group A culture - pending      Temp control  To UC or ER as needed  Fluids  Rest  Humidity at home - add bacteriostatic solution to humidifier  OTC Mucinex liquid cough and cold  Add Zicam or other zinc daily until you feel better        Heather Bustamante NP  [facility]

## 2018-02-13 NOTE — PROGRESS NOTES
SUBJECTIVE:   Milla Bose is a 37 year old female who presents to clinic today for the following health issues:  Sore throat stuffed up nose      Acute Illness   Acute illness concerns: Tickle in ears sore throat   Onset: 2 days    Fever: no     Chills/Sweats: no     Headache (location?): YES- frontal    Sinus Pressure:YES    Conjunctivitis:  no    Ear Pain: no    Rhinorrhea: YES    Congestion: YES    Sore Throat: YES     Cough: YES-productive of clear sputum    Wheeze: no     Decreased Appetite: no     Nausea: YES    Vomiting: no     Diarrhea:  no     Dysuria/Freq.: no     Fatigue/Achiness: YES    Sick/Strep Exposure: no      Therapies Tried and outcome: humidifier ibuprofen         Problem list and histories reviewed & adjusted, as indicated.  Additional history: as documented    Patient Active Problem List   Diagnosis     Schizoaffective disorder (H)     PTSD (post-traumatic stress disorder)     Anxiety state     Panic disorder with agoraphobia     Past Surgical History:   Procedure Laterality Date     cyst removal left breast       cyst removed from right ear       GYN SURGERY      laser of cervix       Social History   Substance Use Topics     Smoking status: Current Every Day Smoker     Packs/day: 0.50     Years: 20.00     Types: Cigarettes     Smokeless tobacco: Never Used     Alcohol use Yes      Comment: rare     Family History   Problem Relation Age of Onset     HEART DISEASE Mother      Alcohol/Drug Mother      Unknown/Adopted Father      CANCER Paternal Grandmother      Cervical         Current Outpatient Prescriptions   Medication Sig Dispense Refill     levonorgestrel (MIRENA) 20 MCG/24HR IUD 1 each (20 mcg) by Intrauterine route continuous       ibuprofen (ADVIL,MOTRIN) 800 MG tablet Take 1 tablet (800 mg) by mouth every 8 hours as needed for moderate pain 100 tablet 0     OXcarbazepine (TRILEPTAL PO) Take 300 mg by mouth 2 times daily       ALPRAZolam (XANAX PO) Take 1 mg by mouth 4 times daily   "     GABAPENTIN PO Take 300 mg by mouth 3 times daily       Amphetamine-Dextroamphetamine (ADDERALL PO) Take 20 mg by mouth 2 times daily       Allergies   Allergen Reactions     Codeine GI Disturbance     Recent Labs   Lab Test  01/04/18   1622  09/10/15   1101   ALT   --   66*   TSH  2.82   --       BP Readings from Last 3 Encounters:   02/13/18 110/76   01/09/18 110/68   01/04/18 110/74    Wt Readings from Last 3 Encounters:   02/13/18 134 lb 6.4 oz (61 kg)   01/09/18 133 lb (60.3 kg)   01/04/18 133 lb (60.3 kg)                  Labs reviewed in EPIC    Reviewed and updated as needed this visit by clinical staff  Tobacco  Allergies       Reviewed and updated as needed this visit by Provider         ROS:  Constitutional, HEENT, cardiovascular, pulmonary, gi and gu systems are negative, except as otherwise noted.    OBJECTIVE:     /76 (BP Location: Left arm, Patient Position: Chair, Cuff Size: Adult Regular)  Pulse 104  Temp 98.7  F (37.1  C) (Tympanic)  Resp 18  Ht 5' 1\" (1.549 m)  Wt 134 lb 6.4 oz (61 kg)  LMP 02/08/2018  SpO2 98%  BMI 25.39 kg/m2  Body mass index is 25.39 kg/(m^2).       GENERAL: healthy, alert and no distress  HENT: normal cephalic/atraumatic, ear canals and TM's normal, nose and mouth without ulcers or lesions, oropharynx clear and oral mucous membranes moist  NECK: no adenopathy, no asymmetry, masses, or scars and thyroid normal to palpation  RESP: lungs clear to auscultation - no rales, rhonchi or wheezes  CV: regular rate and rhythm, normal S1 S2, no S3 or S4, no murmur, click or rub, no peripheral edema and peripheral pulses strong  SKIN: no suspicious lesions or rashes      Diagnostic Test Results:  Results for orders placed or performed in visit on 02/13/18 (from the past 24 hour(s))   Strep, Rapid Screen   Result Value Ref Range    Specimen Description Throat     Rapid Strep A Screen       NEGATIVE: No Group A streptococcal antigen detected by immunoassay, await culture " report.    Rapid Strep A Screen Internal QC OK        ASSESSMENT/PLAN:       1. Pharyngitis, unspecified etiology  - Strep, Rapid Screen  - Beta strep group A culture      Temp control  To UC or ER as needed  Fluids  Rest  Humidity at home - add bacteriostatic solution to humidifier  OTC Mucinex liquid cough and cold  Add Zicam or other zinc daily until you feel better        Heather Bustamante NP  [facility]

## 2018-02-14 ASSESSMENT — PATIENT HEALTH QUESTIONNAIRE - PHQ9: SUM OF ALL RESPONSES TO PHQ QUESTIONS 1-9: 5

## 2018-02-14 ASSESSMENT — ANXIETY QUESTIONNAIRES: GAD7 TOTAL SCORE: 7

## 2018-02-15 ENCOUNTER — HOSPITAL ENCOUNTER (EMERGENCY)
Facility: HOSPITAL | Age: 38
Discharge: HOME | End: 2018-02-15
Attending: PHYSICIAN ASSISTANT | Admitting: PHYSICIAN ASSISTANT
Payer: MEDICARE

## 2018-02-15 VITALS
OXYGEN SATURATION: 98 % | DIASTOLIC BLOOD PRESSURE: 62 MMHG | TEMPERATURE: 97.3 F | RESPIRATION RATE: 16 BRPM | SYSTOLIC BLOOD PRESSURE: 145 MMHG

## 2018-02-15 DIAGNOSIS — K04.7 DENTAL ABSCESS: ICD-10-CM

## 2018-02-15 DIAGNOSIS — K02.9 SEVERE DENTAL CARIES: ICD-10-CM

## 2018-02-15 LAB
BACTERIA SPEC CULT: NORMAL
Lab: NORMAL
SPECIMEN SOURCE: NORMAL

## 2018-02-15 PROCEDURE — 99213 OFFICE O/P EST LOW 20 MIN: CPT | Performed by: PHYSICIAN ASSISTANT

## 2018-02-15 PROCEDURE — G0463 HOSPITAL OUTPT CLINIC VISIT: HCPCS

## 2018-02-15 RX ORDER — KETOROLAC TROMETHAMINE 10 MG/1
10 TABLET, FILM COATED ORAL EVERY 6 HOURS PRN
Qty: 10 TABLET | Refills: 0 | Status: SHIPPED | OUTPATIENT
Start: 2018-02-15 | End: 2018-03-15

## 2018-02-15 RX ORDER — PENICILLIN V POTASSIUM 500 MG/1
500 TABLET, FILM COATED ORAL 3 TIMES DAILY
Qty: 30 TABLET | Refills: 0 | Status: SHIPPED | OUTPATIENT
Start: 2018-02-15 | End: 2018-02-25

## 2018-02-15 ASSESSMENT — ENCOUNTER SYMPTOMS
NAUSEA: 0
NEUROLOGICAL NEGATIVE: 1
NECK PAIN: 0
VOMITING: 0
NECK STIFFNESS: 0
FATIGUE: 1
RESPIRATORY NEGATIVE: 1
PSYCHIATRIC NEGATIVE: 1
CARDIOVASCULAR NEGATIVE: 1

## 2018-02-15 NOTE — ED AVS SNAPSHOT
[facility]    [address] [address]    Phone:  [phone]                                       Milla Bose   MRN: [id number]    Department:  [facility]   Date of Visit:  2/15/2018           Patient Information     Date Of Birth          1980        Your diagnoses for this visit were:     Dental abscess In area of tooth # 16    Severe dental caries diffuse, many teeth may need extracted       You were seen by Hui Hubbard PA.      Follow-up Information     Please follow up.    Why:  With your new Primary Care Provider, with first available appointment        Follow up with [facility].    Specialty:  EMERGENCY MEDICINE    Why:  If further concerns develop    Contact information:    [address] [address] [phone]    Additional information:    From [facility]: Take [address]. Turn left at [address]. Turn left at the first stoplight on [address]. At the first stop sign, take a right onto [address]. Take a left into the parking lot and continue through until you reach the North enterance of the building.       From [place]: Take [address]. Take the [address] ramp towards [place]. Turn left onto [address]. Take a slight right onto [address]. Turn left at the first stoplight on [address]. At the first stop sign, take a right onto [address]. Take a left into the parking lot and continue through until you reach the North enterance of the building.       From [place]: Take [address]. Take a right at [address]. At the first stop sign, take a right onto [address]. Take a left into the parking lot and continue through until you reach the North enterance of the building.         Discharge Instructions       You should be getting a call from the Oral Clinic and from , tomorrow.      Discharge  References/Attachments     ABSCESS, DENTAL (ENGLISH)    DENTAL CAVITY (ENGLISH)    TOOTH DECAY, UNDERSTANDING (ENGLISH)      ED Discharge Orders     ORAL SURGERY REFERRAL       Your provider has referred you to: {ORAL SURGERY       Please be aware that coverage of these services is subject to the terms and limitations of your health insurance plan.  Call member services at your health plan with any benefit or coverage questions.      Please bring the following to your appointment:    >>   Any x-rays, CTs or MRIs which have been performed.  Contact the facility where they were done to arrange for  prior to your scheduled appointment.    >>   List of current medications   >>   This referral request   >>   Any documents/labs given to you for this referral                     Review of your medicines      START taking        Dose / Directions Last dose taken    ketorolac 10 MG tablet   Commonly known as:  TORADOL   Dose:  10 mg   Quantity:  10 tablet        Take 1 tablet (10 mg) by mouth every 6 hours as needed for moderate pain   Refills:  0        penicillin V potassium 500 MG tablet   Commonly known as:  VEETID   Dose:  500 mg   Quantity:  30 tablet        Take 1 tablet (500 mg) by mouth 3 times daily for 10 days   Refills:  0          Our records show that you are taking the medicines listed below. If these are incorrect, please call your family doctor or clinic.        Dose / Directions Last dose taken    ADDERALL PO   Dose:  20 mg        Take 20 mg by mouth 2 times daily   Refills:  0        GABAPENTIN PO   Dose:  300 mg        Take 300 mg by mouth 3 times daily   Refills:  0        ibuprofen 800 MG tablet   Commonly known as:  ADVIL/MOTRIN   Dose:  800 mg   Quantity:  100 tablet        Take 1 tablet (800 mg) by mouth every 8 hours as needed for moderate pain   Refills:  0        levonorgestrel 20 MCG/24HR IUD   Commonly known as:  MIRENA   Dose:  1 each        1 each (20 mcg) by Intrauterine route  continuous   Refills:  0        NAPROXEN PO        Refills:  0        TRILEPTAL PO   Dose:  300 mg        Take 300 mg by mouth 2 times daily   Refills:  0        XANAX PO   Dose:  1 mg        Take 1 mg by mouth 4 times daily   Refills:  0                Prescriptions were sent or printed at these locations (2 Prescriptions)                   [facility] - [place], [place] - [address] AT [facility] [address]   [address], [place] [address]    Telephone:  [phone]   Fax:  [phone]   Hours:                  E-Prescribed (2 of 2)         penicillin V potassium (VEETID) 500 MG tablet               ketorolac (TORADOL) 10 MG tablet                Orders Needing Specimen Collection     None      Pending Results     No orders found from 2/13/2018 to 2/16/2018.            Pending Culture Results     No orders found from 2/13/2018 to 2/16/2018.            Thank you for choosing [facility]       Thank you for choosing [facility] for your care. Our goal is always to provide you with excellent care. Hearing back from our patients is one way we can continue to improve our services. Please take a few minutes to complete the written survey that you may receive in the mail after you visit with us. Thank you!        [facility] Information     [facility] gives you secure access to your electronic health record. If you see a primary care provider, you can also send messages to your care team and make appointments. If you have questions, please call your primary care clinic.  If you do not have a primary care provider, please call [phone] and they will assist you.        Care EveryWhere ID     This is your Care EveryWhere ID. This could be used by other organizations to access your [facility] medical records  [id number]        Equal Access to Services     AMANDA ROONEY : Ai Russell, orin cortés, mackenzie mays. So [facility]  [phone].    ATENCIÓN: Si habla español, tiene a morgan disposición servicios gratuitos de asistencia lingüística. Llame al [phone].    We comply with applicable federal civil rights laws and [place] laws. We do not discriminate on the basis of race, color, national origin, age, disability, sex, sexual orientation, or gender identity.            After Visit Summary       This is your record. Keep this with you and show to your community pharmacist(s) and doctor(s) at your next visit.

## 2018-02-15 NOTE — ED AVS SNAPSHOT
[facility]    [address] [address]    Phone:  [phone]                                       Milla Bose   MRN: [id number]    Department:  [facility]   Date of Visit:  2/15/2018           After Visit Summary Signature Page     I have received my discharge instructions, and my questions have been answered. I have discussed any challenges I see with this plan with the nurse or doctor.    ..........................................................................................................................................  Patient/Patient Representative Signature      ..........................................................................................................................................  Patient Representative Print Name and Relationship to Patient    ..................................................               ................................................  Date                                            Time    ..........................................................................................................................................  Reviewed by Signature/Title    ...................................................              ..............................................  Date                                                            Time

## 2018-02-16 NOTE — ED PROVIDER NOTES
History     Chief Complaint   Patient presents with     Dental Pain     c/o dental pain     The history is provided by the patient. No  was used.     Milla Bose is a 37 year old female who has acute exacerbation of chronic dental pain and swelling - top left, in the back. Pt states she thinks this tooth had been extracted in the past. Pt is a poor dental historian.  States she had a Consult for Oral Surgery, needing multiple teeth extraction. However, states when got to her appt, she was told the referral had . No fever.     Problem List:    Patient Active Problem List    Diagnosis Date Noted     Schizoaffective disorder (H) 2012     Priority: Medium     PTSD (post-traumatic stress disorder) 2012     Priority: Medium     Anxiety state 2012     Priority: Medium     Panic disorder with agoraphobia 2012     Priority: Medium        Past Medical History:    Past Medical History:   Diagnosis Date     Anxiety 2012     Asthma,unspecified type, unspecified 2012     Panic disorder with agoraphobia 2012     PTSD (post-traumatic stress disorder) 2012     Schizoaffective disorder 2012       Past Surgical History:    Past Surgical History:   Procedure Laterality Date     cyst removal left breast       cyst removed from right ear       GYN SURGERY      laser of cervix       Family History:    Family History   Problem Relation Age of Onset     HEART DISEASE Mother      Alcohol/Drug Mother      Unknown/Adopted Father      CANCER Paternal Grandmother      Cervical       Social History:  Marital Status:  Single [1]  Social History   Substance Use Topics     Smoking status: Current Every Day Smoker     Packs/day: 0.50     Years: 20.00     Types: Cigarettes     Smokeless tobacco: Never Used     Alcohol use Yes      Comment: rare        Medications:      NAPROXEN PO   penicillin V potassium (VEETID) 500 MG tablet   ketorolac (TORADOL) 10 MG tablet    levonorgestrel (MIRENA) 20 MCG/24HR IUD   OXcarbazepine (TRILEPTAL PO)   ALPRAZolam (XANAX PO)   GABAPENTIN PO   Amphetamine-Dextroamphetamine (ADDERALL PO)   ibuprofen (ADVIL,MOTRIN) 800 MG tablet         Review of Systems   Constitutional: Positive for fatigue.   HENT: Positive for dental problem. Negative for ear pain.    Respiratory: Negative.    Cardiovascular: Negative.    Gastrointestinal: Negative for nausea and vomiting.   Musculoskeletal: Negative for neck pain and neck stiffness.   Neurological: Negative.    Psychiatric/Behavioral: Negative.        Physical Exam   BP: 145/62  Heart Rate: 89  Temp: 97.3  F (36.3  C)  Resp: 16  SpO2: 98 %      Physical Exam   Constitutional: She is oriented to person, place, and time. She appears well-developed and well-nourished. No distress.   HENT:   Head: Normocephalic and atraumatic.   Area of tooth # 16, gingiva has edema/erythema. Left cheek has no edema/erythema.  Pt has diffuse dental caries   Neck: Normal range of motion. Neck supple.   Cardiovascular: Normal rate, regular rhythm and normal heart sounds.    Pulmonary/Chest: Effort normal and breath sounds normal.   Lymphadenopathy:     She has no cervical adenopathy.   Neurological: She is alert and oriented to person, place, and time.   Skin: She is not diaphoretic.   Psychiatric: She has a normal mood and affect.   Nursing note and vitals reviewed.      ED Course     ED Course     Procedures            Assessments & Plan (with Medical Decision Making)     I have reviewed the nursing notes.    I have reviewed the findings, diagnosis, plan and need for follow up with the patient.      Discharge Medication List as of 2/15/2018  6:30 PM      START taking these medications    Details   penicillin V potassium (VEETID) 500 MG tablet Take 1 tablet (500 mg) by mouth 3 times daily for 10 days, Disp-30 tablet, R-0, E-Prescribe      ketorolac (TORADOL) 10 MG tablet Take 1 tablet (10 mg) by mouth every 6 hours as needed  for moderate pain, Disp-10 tablet, R-0, E-Prescribe             Final diagnoses:   Dental abscess - In area of tooth # 16   Severe dental caries - diffuse, many teeth may need extracted         Oral surgery Consult ordered  I placed a message with the  RN pool. Pt and her baby, could use help getting established.  Patient given education sheet for each diagnosis.  Patient has no further questions.  Take medication as as directed.  Follow up with dental provider with first available appointment.  Follow up with PCP with first available appt.   Return to ED if fever/concerns develop  Hui Hubbard   Physician Assistant-C  2/15/2018  6:59 PM  [facility]  2/15/2018   [facility]     Hui Hubbard PA  02/15/18 1792

## 2018-03-15 ENCOUNTER — OFFICE VISIT (OUTPATIENT)
Dept: FAMILY MEDICINE | Facility: OTHER | Age: 38
End: 2018-03-15
Attending: FAMILY MEDICINE
Payer: MEDICARE

## 2018-03-15 VITALS
SYSTOLIC BLOOD PRESSURE: 104 MMHG | BODY MASS INDEX: 25.68 KG/M2 | DIASTOLIC BLOOD PRESSURE: 68 MMHG | WEIGHT: 136 LBS | HEART RATE: 103 BPM | HEIGHT: 61 IN | TEMPERATURE: 98.8 F | OXYGEN SATURATION: 99 % | RESPIRATION RATE: 16 BRPM

## 2018-03-15 DIAGNOSIS — F25.9 SCHIZOAFFECTIVE DISORDER, UNSPECIFIED TYPE (H): Primary | ICD-10-CM

## 2018-03-15 DIAGNOSIS — F43.10 POSTTRAUMATIC STRESS DISORDER: ICD-10-CM

## 2018-03-15 DIAGNOSIS — F40.01 AGORAPHOBIA WITH PANIC DISORDER: ICD-10-CM

## 2018-03-15 PROCEDURE — 99213 OFFICE O/P EST LOW 20 MIN: CPT | Performed by: FAMILY MEDICINE

## 2018-03-15 PROCEDURE — G0463 HOSPITAL OUTPT CLINIC VISIT: HCPCS

## 2018-03-15 RX ORDER — OXCARBAZEPINE 300 MG/1
300 TABLET, FILM COATED ORAL 2 TIMES DAILY
Qty: 60 TABLET | Refills: 0 | Status: SHIPPED | OUTPATIENT
Start: 2018-03-15 | End: 2018-04-08

## 2018-03-15 RX ORDER — ALPRAZOLAM 1 MG
1 TABLET ORAL 4 TIMES DAILY
Qty: 56 TABLET | Refills: 0 | Status: SHIPPED | OUTPATIENT
Start: 2018-03-15 | End: 2018-03-15

## 2018-03-15 RX ORDER — ALPRAZOLAM 1 MG
1 TABLET ORAL 4 TIMES DAILY
Qty: 20 TABLET | Refills: 0 | Status: SHIPPED | OUTPATIENT
Start: 2018-03-15 | End: 2018-03-20

## 2018-03-15 ASSESSMENT — PAIN SCALES - GENERAL: PAINLEVEL: NO PAIN (0)

## 2018-03-15 NOTE — PROGRESS NOTES
SUBJECTIVE:   Milla Bose is a 37 year old female who presents to clinic today for the following health issues:      Psych Medication refills      Duration: States ran out and missed new appt for Psych    Description (location/character/radiation): Has an new appt 03/20/2018 with [facility].    Intensity:  N/A    Accompanying signs and symptoms: States has been on the same medications for a long time    History (similar episodes/previous evaluation): Was seeing Donna Ortiz patient moved to [place] so changing care.    Precipitating or alleviating factors: None    Therapies tried and outcome: See medications list       Problem list and histories reviewed & adjusted, as indicated.  Additional history: as documented    Patient Active Problem List   Diagnosis     Schizoaffective disorder (H)     PTSD (post-traumatic stress disorder)     Anxiety state     Panic disorder with agoraphobia     Astigmatism     Major depression     Myopia     Personality, multiple     Congenital cataract     Past Surgical History:   Procedure Laterality Date     cyst removal left breast       cyst removed from right ear       GYN SURGERY      laser of cervix       Social History   Substance Use Topics     Smoking status: Current Every Day Smoker     Packs/day: 0.50     Years: 20.00     Types: Cigarettes     Smokeless tobacco: Never Used     Alcohol use Yes      Comment: rare     Family History   Problem Relation Age of Onset     HEART DISEASE Mother      Alcohol/Drug Mother      Unknown/Adopted Father      CANCER Paternal Grandmother      Cervical         Current Outpatient Prescriptions   Medication Sig Dispense Refill     OXcarbazepine (TRILEPTAL) 300 MG tablet Take 1 tablet (300 mg) by mouth 2 times daily 60 tablet 0     ALPRAZolam (XANAX) 1 MG tablet Take 1 tablet (1 mg) by mouth 4 times daily for 5 days 20 tablet 0     NAPROXEN PO Take by mouth 2 times daily as needed        levonorgestrel (MIRENA) 20  "MCG/24HR IUD 1 each (20 mcg) by Intrauterine route continuous       ibuprofen (ADVIL,MOTRIN) 800 MG tablet Take 1 tablet (800 mg) by mouth every 8 hours as needed for moderate pain 100 tablet 0     GABAPENTIN PO Take 300 mg by mouth 3 times daily       Amphetamine-Dextroamphetamine (ADDERALL PO) Take 20 mg by mouth 2 times daily       [DISCONTINUED] OXcarbazepine (TRILEPTAL PO) Take 300 mg by mouth 2 times daily       Allergies   Allergen Reactions     Codeine GI Disturbance       Reviewed and updated as needed this visit by clinical staff  Tobacco  Allergies  Meds  Med Hx  Surg Hx  Fam Hx  Soc Hx      Reviewed and updated as needed this visit by Provider         ROS:  Constitutional, HEENT, cardiovascular, pulmonary, gi and gu systems are negative, except as otherwise noted.    OBJECTIVE:     /68  Pulse 103  Temp 98.8  F (37.1  C) (Tympanic)  Resp 16  Ht 5' 1\" (1.549 m)  Wt 136 lb (61.7 kg)  SpO2 99%  BMI 25.7 kg/m2  Body mass index is 25.7 kg/(m^2).  GENERAL: healthy, alert and no distress  PSYCH: mentation appears normal, affect normal/bright    Diagnostic Test Results:  none     ASSESSMENT/PLAN:     1. Schizoaffective disorder, unspecified type (H)  Medication refilled.  - OXcarbazepine (TRILEPTAL) 300 MG tablet; Take 1 tablet (300 mg) by mouth 2 times daily  Dispense: 60 tablet; Refill: 0    2. PTSD (post-traumatic stress disorder)  Medication refilled until appointment.  Patient is counseled that her dose of Xanax and her Adderall will not be refilled through this clinic.  - ALPRAZolam (XANAX) 1 MG tablet; Take 1 tablet (1 mg) by mouth 4 times daily for 5 days  Dispense: 20 tablet; Refill: 0    3. Panic disorder with agoraphobia  - ALPRAZolam (XANAX) 1 MG tablet; Take 1 tablet (1 mg) by mouth 4 times daily for 5 days  Dispense: 20 tablet; Refill: 0        Bonny Valdes MD  [facility]"

## 2018-03-15 NOTE — NURSING NOTE
"Chief Complaint   Patient presents with     Mental Health Problem       Initial /68  Pulse 103  Temp 98.8  F (37.1  C) (Tympanic)  Resp 16  Ht 5' 1\" (1.549 m)  Wt 136 lb (61.7 kg)  SpO2 99%  BMI 25.7 kg/m2 Estimated body mass index is 25.7 kg/(m^2) as calculated from the following:    Height as of this encounter: 5' 1\" (1.549 m).    Weight as of this encounter: 136 lb (61.7 kg).  Medication Reconciliation: complete.  Swathi Michelle      "

## 2018-03-15 NOTE — MR AVS SNAPSHOT
After Visit Summary   3/15/2018    Milla Bose    MRN: [id number]           Patient Information     Date Of Birth          1980        Visit Information        Provider Department      3/15/2018 11:15 AM Bonny Valdes MD [facility]        Today's Diagnoses     Schizoaffective disorder, unspecified type (H)    -  1    PTSD (post-traumatic stress disorder)        Panic disorder with agoraphobia           Follow-ups after your visit        Follow-up notes from your care team     Return if symptoms worsen or fail to improve.      Who to contact     If you have questions or need follow up information about today's clinic visit or your schedule please contact [facility] directly at [phone].  Normal or non-critical lab and imaging results will be communicated to you by MyChart, letter or phone within 4 business days after the clinic has received the results. If you do not hear from us within 7 days, please contact the clinic through [facility] or phone. If you have a critical or abnormal lab result, we will notify you by phone as soon as possible.  Submit refill requests through [employer] or call your pharmacy and they will forward the refill request to us. Please allow 3 business days for your refill to be completed.          Additional Information About Your Visit        MyChart Information     [employer] gives you secure access to your electronic health record. If you see a primary care provider, you can also send messages to your care team and make appointments. If you have questions, please call your primary care clinic.  If you do not have a primary care provider, please call [phone] and they will assist you.        Care EveryWhere ID     This is your Care EveryWhere ID. This could be used by other organizations to access your [place] medical records  [id number]        Your Vitals Were     Pulse Temperature Respirations Height Pulse Oximetry BMI (Body Mass  "Index)    103 98.8  F (37.1  C) (Tympanic) 16 5' 1\" (1.549 m) 99% 25.7 kg/m2       Blood Pressure from Last 3 Encounters:   03/15/18 104/68   02/15/18 145/62   02/13/18 110/76    Weight from Last 3 Encounters:   03/15/18 136 lb (61.7 kg)   02/13/18 134 lb 6.4 oz (61 kg)   01/09/18 133 lb (60.3 kg)              Today, you had the following     No orders found for display         Today's Medication Changes          These changes are accurate as of 3/15/18 12:42 PM.  If you have any questions, ask your nurse or doctor.               Start taking these medicines.        Dose/Directions    ALPRAZolam 1 MG tablet   Commonly known as:  XANAX   Used for:  Posttraumatic stress disorder, Agoraphobia with panic disorder   Started by:  Bonny Valdes MD        Dose:  1 mg   Take 1 tablet (1 mg) by mouth 4 times daily for 5 days   Quantity:  20 tablet   Refills:  0         These medicines have changed or have updated prescriptions.        Dose/Directions    OXcarbazepine 300 MG tablet   Commonly known as:  TRILEPTAL   This may have changed:  medication strength   Used for:  Schizoaffective disorder, unspecified type (H)   Changed by:  Bonny Valdes MD        Dose:  300 mg   Take 1 tablet (300 mg) by mouth 2 times daily   Quantity:  60 tablet   Refills:  0         Stop taking these medicines if you haven't already. Please contact your care team if you have questions.     ketorolac 10 MG tablet   Commonly known as:  TORADOL   Stopped by:  Bonny Valdes MD                Where to get your medicines      These medications were sent to [id number] - [place], [place] - [address] AT [facility] [address] [place] [address]     Phone:  [phone]     OXcarbazepine 300 MG tablet         Some of these will need a paper prescription and others can be bought over the counter.  Ask your nurse if you have questions.     Bring a paper prescription for each of these medications     " ALPRAZolam 1 MG tablet                Primary Care Provider Office Phone # Fax #    Bonny RUBEN Valdes [phone] [facility] [id number]        Equal Access to Services     FLORES ROONEY : Hadii aad ku hadcarlylebrigitte Solaura, waaxda luqadaha, qaybta kaalmada adeegyada, mackenzie halleyin hayaacameron tolentinorossanaray fam. So [facility] [phone].    ATENCIÓN: Si habla español, tiene a morgan disposición servicios gratuitos de asistencia lingüística. Llame al [phone].    We comply with applicable federal civil rights laws and [place] laws. We do not discriminate on the basis of race, color, national origin, age, disability, sex, sexual orientation, or gender identity.            Thank you!     Thank you for choosing [facility]  for your care. Our goal is always to provide you with excellent care. Hearing back from our patients is one way we can continue to improve our services. Please take a few minutes to complete the written survey that you may receive in the mail after your visit with us. Thank you!             Your Updated Medication List - Protect others around you: Learn how to safely use, store and throw away your medicines at www.disposemymeds.org.          This list is accurate as of 3/15/18 12:42 PM.  Always use your most recent med list.                   Brand Name Dispense Instructions for use Diagnosis    ADDERALL PO      Take 20 mg by mouth 2 times daily        ALPRAZolam 1 MG tablet    XANAX    20 tablet    Take 1 tablet (1 mg) by mouth 4 times daily for 5 days    Posttraumatic stress disorder, Agoraphobia with panic disorder       GABAPENTIN PO      Take 300 mg by mouth 3 times daily        ibuprofen 800 MG tablet    ADVIL/MOTRIN    100 tablet    Take 1 tablet (800 mg) by mouth every 8 hours as needed for moderate pain    Hematoma       levonorgestrel 20 MCG/24HR IUD    MIRENA     1 each (20 mcg) by Intrauterine route continuous    Menorrhagia with  regular cycle       NAPROXEN PO      Take by mouth 2 times daily as needed        OXcarbazepine 300 MG tablet    TRILEPTAL    60 tablet    Take 1 tablet (300 mg) by mouth 2 times daily    Schizoaffective disorder, unspecified type (H)

## 2018-03-16 ASSESSMENT — PATIENT HEALTH QUESTIONNAIRE - PHQ9: SUM OF ALL RESPONSES TO PHQ QUESTIONS 1-9: 9

## 2018-03-30 ENCOUNTER — HOSPITAL ENCOUNTER (EMERGENCY)
Facility: HOSPITAL | Age: 38
Discharge: HOME | End: 2018-03-30
Attending: PHYSICIAN ASSISTANT | Admitting: PHYSICIAN ASSISTANT
Payer: MEDICARE

## 2018-03-30 VITALS — RESPIRATION RATE: 16 BRPM | TEMPERATURE: 97.6 F | OXYGEN SATURATION: 99 %

## 2018-03-30 DIAGNOSIS — Z20.2 EXPOSURE TO SEXUALLY TRANSMITTED DISEASE (STD): ICD-10-CM

## 2018-03-30 DIAGNOSIS — Z11.3 SCREENING EXAMINATION FOR VENEREAL DISEASE: ICD-10-CM

## 2018-03-30 LAB
C TRACH DNA SPEC QL PROBE+SIG AMP: NOT DETECTED
N GONORRHOEA DNA SPEC QL PROBE+SIG AMP: NOT DETECTED
SPECIMEN SOURCE: NORMAL

## 2018-03-30 PROCEDURE — 25000128 H RX IP 250 OP 636: Performed by: PHYSICIAN ASSISTANT

## 2018-03-30 PROCEDURE — 96372 THER/PROPH/DIAG INJ SC/IM: CPT

## 2018-03-30 PROCEDURE — G0463 HOSPITAL OUTPT CLINIC VISIT: HCPCS | Mod: 25

## 2018-03-30 PROCEDURE — 99213 OFFICE O/P EST LOW 20 MIN: CPT | Performed by: PHYSICIAN ASSISTANT

## 2018-03-30 PROCEDURE — 25000132 ZZH RX MED GY IP 250 OP 250 PS 637: Mod: GY | Performed by: PHYSICIAN ASSISTANT

## 2018-03-30 PROCEDURE — 87491 CHLMYD TRACH DNA AMP PROBE: CPT | Performed by: PHYSICIAN ASSISTANT

## 2018-03-30 PROCEDURE — A9270 NON-COVERED ITEM OR SERVICE: HCPCS | Mod: GY | Performed by: PHYSICIAN ASSISTANT

## 2018-03-30 PROCEDURE — 87591 N.GONORRHOEAE DNA AMP PROB: CPT | Performed by: PHYSICIAN ASSISTANT

## 2018-03-30 RX ORDER — AZITHROMYCIN 250 MG/1
1000 TABLET, FILM COATED ORAL ONCE
Status: COMPLETED | OUTPATIENT
Start: 2018-03-30 | End: 2018-03-30

## 2018-03-30 RX ORDER — CEFTRIAXONE SODIUM 1 G
500 VIAL (EA) INJECTION ONCE
Status: COMPLETED | OUTPATIENT
Start: 2018-03-30 | End: 2018-03-30

## 2018-03-30 RX ADMIN — CEFTRIAXONE 500 MG: 1 INJECTION, POWDER, FOR SOLUTION INTRAMUSCULAR; INTRAVENOUS at 13:33

## 2018-03-30 RX ADMIN — AZITHROMYCIN 1000 MG: 250 TABLET, FILM COATED ORAL at 13:31

## 2018-03-30 ASSESSMENT — ENCOUNTER SYMPTOMS
ABDOMINAL PAIN: 0
CARDIOVASCULAR NEGATIVE: 1
PSYCHIATRIC NEGATIVE: 1
CONSTITUTIONAL NEGATIVE: 1
NEUROLOGICAL NEGATIVE: 1

## 2018-03-30 NOTE — ED AVS SNAPSHOT
[facility]    [address]    [place] [address]    Phone:  [phone]                                       Milla Bose   MRN: [id number]    Department:  [facility]   Date of Visit:  3/30/2018           Patient Information     Date Of Birth          1980        Your diagnoses for this visit were:     Screening examination for venereal disease     Exposure to sexually transmitted disease (STD)        You were seen by Hui Hubbard PA.      Follow-up Information     Follow up with Bonny Valdes MD.    Specialty:  Family Practice    Why:  If symptoms develop    Contact information:    [address] [facility] [address]  [phone]          Follow up with [facility].    Specialty:  EMERGENCY MEDICINE    Why:  If further concerns develop over the weekend    Contact information:    [address]  [place] [address] [phone]    Additional information:    From [place] Area: Take [address]. Turn left at [address]. Turn left at the first stoplight on [address]. At the first stop sign, take a right onto [address]. Take a left into the parking lot and continue through until you reach the North enterance of the building.       From [place]: Take [address]. Take the [address] ramp towards [place]. Turn left onto [address]. Take a slight right onto [address]. Turn left at the first stoplight on [address]. At the first stop sign, take a right onto [address]. Take a left into the parking lot and continue through until you reach the North enterance of the building.       From [place]: Take [address]. Take a right at [address]. At the first stop sign, take a right onto [address]. Take a left into the parking lot and continue through until you reach the North enterance of the building.         Discharge Instructions       NO TYPE OF  INTERCOURSE UNTIL AFTER 10 DAYS PAST WHEN YOUR PARTNER HAS BEEN TREATED.    Discharge References/Attachments     GONORRHEA, FEMALE (ENGLISH)    CHLAMYDIA (FEMALE) (ENGLISH)    SEXUALLY TRANSMITTED DISEASES (STDS), WHAT ARE (ENGLISH)         Review of your medicines      Our records show that you are taking the medicines listed below. If these are incorrect, please call your family doctor or clinic.        Dose / Directions Last dose taken    GABAPENTIN PO   Dose:  300 mg        Take 300 mg by mouth 3 times daily   Refills:  0        ibuprofen 800 MG tablet   Commonly known as:  ADVIL/MOTRIN   Dose:  800 mg   Quantity:  100 tablet        Take 1 tablet (800 mg) by mouth every 8 hours as needed for moderate pain   Refills:  0        levonorgestrel 20 MCG/24HR IUD   Commonly known as:  MIRENA   Dose:  1 each        1 each (20 mcg) by Intrauterine route continuous   Refills:  0        NAPROXEN PO        Take by mouth 2 times daily as needed   Refills:  0        OXcarbazepine 300 MG tablet   Commonly known as:  TRILEPTAL   Dose:  300 mg   Quantity:  60 tablet        Take 1 tablet (300 mg) by mouth 2 times daily   Refills:  0                Procedures and tests performed during your visit     GC/Chlamydia by PCR - [facility]      Orders Needing Specimen Collection     None      Pending Results     Date and Time Order Name Status Description    3/30/2018 1256 GC/Chlamydia by PCR - [facility] In process             Pending Culture Results     Date and Time Order Name Status Description    3/30/2018 1256 GC/Chlamydia by PCR - [facility] In process             Thank you for choosing [facility]       Thank you for choosing [facility] for your care. Our goal is always to provide you with excellent care. Hearing back from our patients is one way we can continue to improve our services. Please take a few minutes to complete the written survey that you may receive in the mail after you visit with us. Thank you!        MyChart Information     MyChart  gives you secure access to your electronic health record. If you see a primary care provider, you can also send messages to your care team and make appointments. If you have questions, please call your primary care clinic.  If you do not have a primary care provider, please call [phone] and they will assist you.        Care EveryWhere ID     This is your Care EveryWhere ID. This could be used by other organizations to access your [facility] medical records  [id number]        Equal Access to Services     FLORES ROONEY : Hadii sandy medeiroso Solaura, waaxda lualvaroadaha, qaybta kaalmada amado, mackenzie ramos . So [facility] [phone].    ATENCIÓN: Si habla español, tiene a morgan disposición servicios gratuitos de asistencia lingüística. Raysaame al [phone].    We comply with applicable federal civil rights laws and [place] laws. We do not discriminate on the basis of race, color, national origin, age, disability, sex, sexual orientation, or gender identity.            After Visit Summary       This is your record. Keep this with you and show to your community pharmacist(s) and doctor(s) at your next visit.

## 2018-03-30 NOTE — ED PROVIDER NOTES
History     Chief Complaint   Patient presents with     Exposure to STD     exposure to Gonorrhea beginning of March     The history is provided by the patient. No  was used.     Milla Bose is a 37 year old female who was just notified by a sexual partner, that he was dx with Gonorrhea. Pt denies any vaginal discharge/odor, no pelvic pain. No fever. No abd pain    Problem List:    Patient Active Problem List    Diagnosis Date Noted     Schizoaffective disorder (H) 07/03/2012     Priority: Medium     PTSD (post-traumatic stress disorder) 07/03/2012     Priority: Medium     Anxiety state 07/03/2012     Priority: Medium     Panic disorder with agoraphobia 07/03/2012     Priority: Medium     Major depression 08/03/2011     Priority: Medium     Overview:   IMO Update 10/11       Personality, multiple 08/03/2011     Priority: Medium     Overview:   IMO Update 10/11       Astigmatism 08/31/2009     Priority: Medium     Overview:   IMO Update       Myopia 08/31/2009     Priority: Medium     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     Priority: Medium     Overview:   IMO Update 10/11          Past Medical History:    Past Medical History:   Diagnosis Date     Anxiety 07/03/2012     Asthma,unspecified type, unspecified 07/03/2012     Panic disorder with agoraphobia 07/03/2012     PTSD (post-traumatic stress disorder) 07/03/2012     Schizoaffective disorder 07/03/2012       Past Surgical History:    Past Surgical History:   Procedure Laterality Date     cyst removal left breast       cyst removed from right ear       GYN SURGERY      laser of cervix       Family History:    Family History   Problem Relation Age of Onset     HEART DISEASE Mother      Alcohol/Drug Mother      Unknown/Adopted Father      CANCER Paternal Grandmother      Cervical       Social History:  Marital Status:  Single [1]  Social History   Substance Use Topics     Smoking status: Current Every Day Smoker     Packs/day: 0.50     Years:  20.00     Types: Cigarettes     Smokeless tobacco: Never Used     Alcohol use Yes      Comment: rare        Medications:      OXcarbazepine (TRILEPTAL) 300 MG tablet   NAPROXEN PO   levonorgestrel (MIRENA) 20 MCG/24HR IUD   ibuprofen (ADVIL,MOTRIN) 800 MG tablet   GABAPENTIN PO         Review of Systems   Constitutional: Negative.    HENT: Negative.    Cardiovascular: Negative.    Gastrointestinal: Negative for abdominal pain.   Genitourinary: Negative for pelvic pain and vaginal discharge.   Skin: Negative for rash.   Neurological: Negative.    Psychiatric/Behavioral: Negative.        Physical Exam   Heart Rate: 107  Temp: 97.6  F (36.4  C)  Resp: 16  SpO2: 99 %      Physical Exam   Constitutional: She is oriented to person, place, and time. She appears well-developed and well-nourished. No distress.   Cardiovascular:   tachycardia   Pulmonary/Chest: Effort normal.   Neurological: She is alert and oriented to person, place, and time.   Skin: She is not diaphoretic.   Psychiatric: She has a normal mood and affect.   Nursing note and vitals reviewed.      ED Course     ED Course     Procedures            Results for orders placed or performed during the hospital encounter of 03/30/18 (from the past 24 hour(s))   GC/Chlamydia by PCR - [facility]   Result Value Ref Range    Specimen Source Vagina     Neisseria gonorrhoreae PCR Not Detected NDET^Not Detected    Chlamydia Trachomatis PCR Not Detected NDET^Not Detected       Medications   azithromycin (ZITHROMAX) tablet 1,000 mg (1,000 mg Oral Given 3/30/18 1331)   cefTRIAXone (ROCEPHIN) injection 500 mg (500 mg Intramuscular Given 3/30/18 1333)     Pt tolerated well      Assessments & Plan (with Medical Decision Making)     I have reviewed the nursing notes.    I have reviewed the findings, diagnosis, plan and need for follow up with the patient.      Final diagnoses:   Screening examination for venereal disease   Exposure to sexually transmitted disease (STD)           No  intercourse of any type for at least 10 days after your other sexual partner has been treated.  Pt already contacting him to come in for tx. Make sure to use condoms for all further sexual contact.    Patient verbally educated and given appropriate education sheets for the diagnoses and has no questions.   Follow up with your Primary Care provider if symptoms develop.  if further concerns develop, return to the ER  Hui Hubbard Certified  Physician Assistant  3/30/2018  4:23 PM  URGENT CARE CLINIC      3/30/2018   [facility]     Hui Hubbard PA  03/30/18 9246

## 2018-03-30 NOTE — PHARMACY
[facility]    Pharmacy      Antimicrobial Stewardship Note     Current antimicrobial therapy:  Anti-infectives (Future)    Start     Dose/Rate Route Frequency Ordered Stop    03/30/18 1320  cefTRIAXone (ROCEPHIN) 500 mg in sodium chloride 0.9 % 50 mL intermittent infusion      500 mg  100 mL/hr over 30 Minutes Intravenous ONCE 03/30/18 1319            Indication: Meningitis, std EXPOSURE    Days of Therapy: 1     Pertinent labs:  Creatinine No results found for: CR  WBC   WBC   Date Value Ref Range Status   01/04/2018 9.0 4.0 - 11.0 10e9/L Final     Procalcitonin No results found for: PCAL  CRP No results found for: CRP    Culture Results:    GC/Chlamydia by PCR - [facility], [[id number]] Collected: 03/30/18 1254     Order Status: Completed Lab Status: In process Updated: 03/30/18 1312     Specimen: Urine           Recommendations/Interventions:  1. No recommendations at this time.    [facility]  March 30, 2018

## 2018-03-30 NOTE — ED AVS SNAPSHOT
[facility]    [address] [place] [address]    Phone:  [phone]                                       Milla Bose   MRN: [id number]    Department:  [facility]   Date of Visit:  3/30/2018           After Visit Summary Signature Page     I have received my discharge instructions, and my questions have been answered. I have discussed any challenges I see with this plan with the nurse or doctor.    ..........................................................................................................................................  Patient/Patient Representative Signature      ..........................................................................................................................................  Patient Representative Print Name and Relationship to Patient    ..................................................               ................................................  Date                                            Time    ..........................................................................................................................................  Reviewed by Signature/Title    ...................................................              ..............................................  Date                                                            Time

## 2018-04-03 NOTE — ED NOTES
4/3/18 @ 1700 pt called looking for results of STD testing done on 3/30/18.  Pt advised they were WNL.

## 2018-04-08 DIAGNOSIS — F25.9 SCHIZOAFFECTIVE DISORDER, UNSPECIFIED TYPE (H): ICD-10-CM

## 2018-04-09 RX ORDER — OXCARBAZEPINE 300 MG/1
TABLET, FILM COATED ORAL
Qty: 60 TABLET | Refills: 1 | Status: SHIPPED | OUTPATIENT
Start: 2018-04-09 | End: 2018-09-19

## 2018-07-06 ENCOUNTER — HOSPITAL ENCOUNTER (EMERGENCY)
Facility: HOSPITAL | Age: 38
Discharge: HOME | End: 2018-07-06
Attending: NURSE PRACTITIONER | Admitting: NURSE PRACTITIONER
Payer: MEDICARE

## 2018-07-06 ENCOUNTER — APPOINTMENT (OUTPATIENT)
Dept: GENERAL RADIOLOGY | Facility: HOSPITAL | Age: 38
End: 2018-07-06
Attending: NURSE PRACTITIONER
Payer: MEDICARE

## 2018-07-06 VITALS
RESPIRATION RATE: 16 BRPM | TEMPERATURE: 98.2 F | HEART RATE: 100 BPM | OXYGEN SATURATION: 100 % | DIASTOLIC BLOOD PRESSURE: 68 MMHG | SYSTOLIC BLOOD PRESSURE: 131 MMHG

## 2018-07-06 DIAGNOSIS — M50.30 DDD (DEGENERATIVE DISC DISEASE), CERVICAL: ICD-10-CM

## 2018-07-06 PROCEDURE — 72050 X-RAY EXAM NECK SPINE 4/5VWS: CPT | Mod: TC

## 2018-07-06 PROCEDURE — 99213 OFFICE O/P EST LOW 20 MIN: CPT | Performed by: NURSE PRACTITIONER

## 2018-07-06 PROCEDURE — G0463 HOSPITAL OUTPT CLINIC VISIT: HCPCS

## 2018-07-06 RX ORDER — DIAZEPAM 5 MG
5-10 TABLET ORAL EVERY 8 HOURS PRN
Qty: 15 TABLET | Refills: 0 | Status: SHIPPED | OUTPATIENT
Start: 2018-07-06 | End: 2018-09-19

## 2018-07-06 RX ORDER — KETOROLAC TROMETHAMINE 10 MG/1
10 TABLET, FILM COATED ORAL EVERY 6 HOURS PRN
Qty: 20 TABLET | Refills: 0 | Status: SHIPPED | OUTPATIENT
Start: 2018-07-06 | End: 2019-08-07

## 2018-07-06 ASSESSMENT — ENCOUNTER SYMPTOMS
NECK PAIN: 1
NAUSEA: 0
SORE THROAT: 1
FATIGUE: 0
NUMBNESS: 0
MYALGIAS: 1
WEAKNESS: 0
DIARRHEA: 0
CHILLS: 0
FEVER: 0
LIGHT-HEADEDNESS: 0
HEADACHES: 0
VOMITING: 0
NERVOUS/ANXIOUS: 1
COUGH: 0
SHORTNESS OF BREATH: 0
APPETITE CHANGE: 0
ABDOMINAL PAIN: 0
PHOTOPHOBIA: 0

## 2018-07-06 NOTE — ED PROVIDER NOTES
"  History     Chief Complaint   Patient presents with     Neck Pain     3 weeks of left sided neck pain     The history is provided by the patient. No  was used.     Milla Bose is a 38 year old female who presents today with a 3 week history of left sided neck pain.  She has a history of neck pain in the past, states she thinks she had an MRI approximately 10 years ago, but unsure.  No recent injury.  She states that just prior to the start of the pain she notes that her son was having a rough week and she had to hold him firmly several times to prevent him from hurting himself or others around him.  No paresthesia in her upper extremities, states she had some \"skin numbness\" in the anteriorlateral left thigh a few days ago, this has since resolved.  No fevers or chills, history of steroid use,  IV drug use, trauma, history of CA.       Problem List:    Patient Active Problem List    Diagnosis Date Noted     Schizoaffective disorder (H) 07/03/2012     Priority: Medium     PTSD (post-traumatic stress disorder) 07/03/2012     Priority: Medium     Anxiety state 07/03/2012     Priority: Medium     Panic disorder with agoraphobia 07/03/2012     Priority: Medium     Major depression 08/03/2011     Priority: Medium     Overview:   IMO Update 10/11       Personality, multiple 08/03/2011     Priority: Medium     Overview:   IMO Update 10/11       Astigmatism 08/31/2009     Priority: Medium     Overview:   IMO Update       Myopia 08/31/2009     Priority: Medium     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     Priority: Medium     Overview:   IMO Update 10/11          Past Medical History:    Past Medical History:   Diagnosis Date     Anxiety 07/03/2012     Asthma,unspecified type, unspecified 07/03/2012     Panic disorder with agoraphobia 07/03/2012     PTSD (post-traumatic stress disorder) 07/03/2012     Schizoaffective disorder 07/03/2012       Past Surgical History:    Past Surgical History:   Procedure " Laterality Date     cyst removal left breast       cyst removed from right ear       GYN SURGERY      laser of cervix       Family History:    Family History   Problem Relation Age of Onset     HEART DISEASE Mother      Alcohol/Drug Mother      Unknown/Adopted Father      Cancer Paternal Grandmother      Cervical       Social History:  Marital Status:  Single [1]  Social History   Substance Use Topics     Smoking status: Current Every Day Smoker     Packs/day: 0.50     Years: 20.00     Types: Cigarettes     Smokeless tobacco: Never Used     Alcohol use Yes      Comment: rare        Medications:      Acetaminophen (TYLENOL PO)   ALPRAZolam (XANAX PO)   Cyclobenzaprine HCl (FLEXERIL PO)   diazepam (VALIUM) 5 MG tablet   GABAPENTIN PO   ibuprofen (ADVIL,MOTRIN) 800 MG tablet   ketorolac (TORADOL) 10 MG tablet   levonorgestrel (MIRENA) 20 MCG/24HR IUD   NAPROXEN PO   OXcarbazepine (TRILEPTAL) 300 MG tablet         Review of Systems   Constitutional: Negative for appetite change, chills, fatigue and fever.   HENT: Positive for sore throat.    Eyes: Negative for photophobia and visual disturbance.   Respiratory: Negative for cough and shortness of breath.    Gastrointestinal: Negative for abdominal pain, diarrhea, nausea and vomiting.   Musculoskeletal: Positive for myalgias and neck pain.   Skin: Negative for rash.   Neurological: Negative for weakness, light-headedness, numbness and headaches.   Psychiatric/Behavioral: The patient is nervous/anxious (long term anxiety, no current suicidal/homicidal ideation).        Physical Exam   BP: 131/68  Pulse: 100  Temp: 98.2  F (36.8  C)  Resp: 16  SpO2: 100 %      Physical Exam   Constitutional: She is oriented to person, place, and time. Vital signs are normal. She appears well-developed. She is cooperative. She does not appear ill.   HENT:   Head: Normocephalic and atraumatic.   Left Ear: External ear and ear canal normal. Tympanic membrane is not perforated, not erythematous  and not bulging. A middle ear effusion (clear air/fluid level) is present.   Mouth/Throat: Uvula is midline. No posterior oropharyngeal edema or posterior oropharyngeal erythema.   Right canal occluded with cerumen  PND noted in posterior oropharynx   Neck: Spinous process tenderness (C6-C7) and muscular tenderness (left trapezius) present. No rigidity. No edema, no erythema and normal range of motion present.   Cardiovascular: Normal rate and regular rhythm.    Pulmonary/Chest: Effort normal and breath sounds normal.   Musculoskeletal: Normal range of motion.   Neurological: She is alert and oriented to person, place, and time. Coordination normal.   Skin: Skin is warm and dry.   Psychiatric: She has a normal mood and affect. Her behavior is normal.   Nursing note and vitals reviewed.      ED Course     ED Course     Procedures    Results for orders placed or performed during the hospital encounter of 07/06/18   XR Cervical Spine G/E 4 Views    Narrative    XR CERVICAL SPINE G/E 4 VW    HISTORY: 38 yearsFemale neck pain x 3 weeks, no recent injury;     TECHNIQUE: Cervical spine 6 views    COMPARISON: None    FINDINGS: There is advanced degenerative disc space narrowing at  C5-C6. There is degenerative osteosclerosis and posterior osteophytic  ridging. There is mild degenerative anterolisthesis of C4 in relation  to C5.     There is moderate to severe disc space narrowing of C6-C7.    There is right neuroforaminal narrowing of moderate severity at C5-C6.  There is mild left neuroforaminal narrowing at C5-C6      Impression    IMPRESSION: Advanced degenerative disc disease C5-C6 with moderate  degenerative changes at C6-C7. There is bilateral neuroforaminal  narrowing at C5-C6.    SITA BURR MD       Assessments & Plan (with Medical Decision Making)     I have reviewed the nursing notes.    I have reviewed the findings, diagnosis, plan and need for follow up with the patient.  ASSESSMENT / PLAN:  (M50.30) DDD  "(degenerative disc disease), cervical  Comment: n/v intact, TUNA FISH neg  Plan:  Rest, ice/heat for comfort   Toradol for pain/inflammation   Valium for muscle relaxer - do not drive or participate in activities that require mental alertness while taking   Patient verbally educated and given appropriate education sheets for their diagnoses and has no questions.   Take medications as directed.    Return to ED/UC if symptoms increase or concerns develop such as those discussed and listed on the \"When to go the Emergency Room\" portion of your discharge instructions.     Follow up with Dr Valdes on 7/13 - the appointment has been made, return to the ED prior to with worsening of symptoms      Discharge Medication List as of 7/6/2018 12:47 PM      START taking these medications    Details   diazepam (VALIUM) 5 MG tablet Take 1-2 tablets (5-10 mg) by mouth every 8 hours as needed for muscle spasms, Disp-15 tablet, R-0, Local Print      ketorolac (TORADOL) 10 MG tablet Take 1 tablet (10 mg) by mouth every 6 hours as needed, Disp-20 tablet, R-0, E-Prescribe             Final diagnoses:   DDD (degenerative disc disease), cervical       7/6/2018   [facility]     Swathi Hdz NP  07/06/18 [id number]    "

## 2018-07-06 NOTE — DISCHARGE INSTRUCTIONS
General Neck and Back Pain    Both neck and back pain are usually caused by injury to the muscles or ligaments of the spine. Sometimes the disks that separate each bone of the spine may cause pain by pressing on a nearby nerve. Back and neck pain may appear after a sudden twisting or bending force (such as in a car accident), or sometimes after a simple awkward movement. In either case, muscle spasm is often present and adds to the pain.  Acute neck and back pain usually gets better in 1 to 2 weeks. Pain related to disk disease, arthritis in the spinal joints or spinal stenosis (narrowing of the spinal canal) can become chronic and last for months or years.  Back and neck pain are common problems. Most people feel better in 1 or 2 weeks, and most of the rest in 1 to 2 months. Most people can remain active.  People have and describe pain differently.    Pain can be sharp, stabbing, shooting, aching, cramping, or burning    Movement, standing, bending, lifting, sitting, or walking may worsen the pain    Pain can be localized to one spot or area, or it can be more generalized    Pain can spread or radiate upwards, downwards, to the front, or go down your arms    Muscle spasm may occur.  Most of the time mechanical problems with the muscles or spine cause the pain. it is usually caused by an injury, whether known or not, to the muscles or ligaments. While illnesses can cause back pain, it is usually not caused by a serious illness. Pain is usually related to physical activity, whether sports, exercise, work, or normal activity. Sometimes it can occur without an identifiable cause. This can happen simply by stretching or moving wrong, without noting pain at the time. Other causes include:    Overexertion, lifting, pushing, pulling incorrectly or too aggressively.    Sudden twisting, bending or stretching from an accident (car or fall), or accidental movement.    Poor posture    Poor conditioning, lack of regular  exercise    Spinal disc disease or arthritis    Stress    Pregnancy, or illness like appendicitis, bladder or kidney infection, pelvic infections   Home care    For neck pain: Use a comfortable pillow that supports the head and keeps the spine in a neutral position. The position of the head should not be tilted forward or backward.    When in bed, try to find a position of comfort. A firm mattress is best. Try lying flat on your back with pillows under your knees. You can also try lying on your side with your knees bent up towards your chest and a pillow between your knees.    At first, do not try to stretch out the sore spots. If there is a strain, it is not like the good soreness you get after exercising without an injury. In this case, stretching may make it worse.    Don't sit for long periods, as in long car rides or other travel. This puts more stress on the lower back than standing or walking.    During the first 24 to 72 hours after an injury, apply an ice pack to the painful area for 20 minutes and then remove it for 20 minutes over a period of 60 to 90 minutes or several times a day.     You can alternate ice and heat therapies. Talk with your healthcare provider about the best treatment for your back or neck pain. As a safety precaution, do not use a heating pad at bedtime. Sleeping with a heating pad can lead to skin burns or tissue damage.    Therapeutic massage can help relax the back and neck muscles without stretching them.    Be aware of safe lifting methods and do not lift anything over 15 pounds until all the pain is gone.  Medicines  Talk to your healthcare provider before using medicine, especially if you have other medical problems or are taking other medicines.    You may use over-the-counter medicine to control pain, unless another pain medicine was prescribed. If you have chronic conditions like diabetes, liver or kidney disease, stomach ulcers,  gastrointestinal bleeding, or are taking  blood thinner medicines.    Be careful if you are given pain medicines, narcotics, or medicine for muscle spasm. They can cause drowsiness, and can affect your coordination, reflexes, and judgment. Do not drive or operate heavy machinery.  Follow-up care  Follow up with your healthcare provider, or as advised. Physical therapy or further tests may be needed.  If X-rays were taken, you will be notified of any new findings that may affect your care.  Call 911  Call 911 if any of the following occur:    Trouble breathing    Confusion    Very drowsy or trouble awakening    Fainting or loss of consciousness    Rapid or very slow heart rate    Loss of bowel or bladder control  When to seek medical advice  Call your healthcare provider right away if any of these occur:    Pain becomes worse or spreads into your arms or legs    Weakness, numbness or pain in one or both arms or legs    Numbness in the groin area    Difficulty walking    Fever of 100.4 F (38 C) or higher, or as directed by your healthcare provider  Date Last Reviewed: 7/1/2016 2000-2017 [employer]. [address] [place] [address]. All rights reserved. This information is not intended as a substitute for professional medical care. Always follow your healthcare professional's instructions.          Degenerative Disk Disease    Spinal disks are gel-filled cushions between the bones, or vertebrae, of the spine. The disks act like shock absorbers. Over time, the disks may break down. This is called degenerative disk disease.  This condition can affect the neck or back. It is one of the most common causes of low back pain. It is the leading cause of disability in people under age 45 in the [place]. The pain often remains localized to the lower back or neck. Muscle spasm is often present and adds to the pain.  Disk degeneration is a natural part of aging. But it is not painful for most people. It may also occur as a result of  repeated minor injuries due to daily activities, sports, or accidents. It may lead to osteoarthritis of the spine. Back pain related to disk disease may come and go. Or it may become chronic and last for months or years. The disk may bulge or rupture. This is called a slipped disk or herniated disk. That can put pressure on a nearby spinal nerve and cause neck or back pain that spreads down one arm or leg.  X-rays or an MRI may help to diagnose this condition. For acute pain, treatment includes anti-inflammatory medicines, muscle relaxants, rest, ice, or heat. Strong prescription pain medicines, called opioids, may be needed for short-term treatment if pain suddenly gets worse. Opioid medicines can be addictive. So they are not advised for long-term pain management. Other types of medicines are preferred. Surgery is generally not used to treat this condition unless there is a complication.    Home care    For neck pain: Use a comfortable pillow that supports the head and keeps the spine in a neutral position. Your head should not be tilted forward or backward.    For back pain: Avoid sitting for long periods of time. This puts more stress on the lower back than standing or walking. Starting a regular exercise program to strengthen the supporting muscles of the spine will make it easier to live with degenerative disk disease.    Apply an ice pack over the injured area for no more than 15 to 20 minutes. Do this every 3 to 6 hours for the first 24 to 48 hours. To make an ice pack, put ice cubes in a plastic bag that seals at the top. Wrap the bag in a clean, thin towel or cloth. Never put ice or an ice pack directly on the skin. Keep using ice packs to ease pain and swelling as needed. After 48 hours, apply heat (warm shower or warm bath) for 20 minutes several times a day, or switch between ice and heat.     You may use over-the-counter pain medicine to control pain, unless another pain medicine was prescribed. If you  have chronic liver or kidney disease or ever had a stomach ulcer or GI bleeding, talk with your provider before using these medicines.  Follow-up care  Follow up with your healthcare provider, or as directed.  If X-rays, a CT scan or an MRI were done, you will be notified of any new findings that may affect your care.  When to seek medical advice  Contact your healthcare provider right away if any of these occur:    Increasing back pain    Your foot drags when you walk, a condition called foot drop    You have new weakness, numbness, or pain in one or both arms or legs    Loss of bowel or bladder control    Numbness or tingling in the buttock or groin area  Date Last Reviewed: 11/23/2015 2000-2017 [employer]. [address] [place] [address], [place] [address]. All rights reserved. This information is not intended as a substitute for professional medical care. Always follow your healthcare professional's instructions.

## 2018-07-06 NOTE — ED AVS SNAPSHOT
[facility]    [address] [address]    Phone:  [phone]                                       Milla Bose   MRN: [id number]    Department:  [facility]   Date of Visit:  7/6/2018           Patient Information     Date Of Birth          1980        Your diagnoses for this visit were:     DDD (degenerative disc disease), cervical        You were seen by Swathi Hdz NP.      Follow-up Information     Follow up with Bonny Valdes MD On 7/13/2018.    Specialty:  Family Practice    Why:  @ 11:15 AM for re evaluation     Contact information:    [address] [facility] [id number] [phone]          Follow up with [facility].    Specialty:  EMERGENCY MEDICINE    Why:  As needed, If symptoms worsen, or concerns develop    Contact information:    [address] [place] [address] [phone]    Additional information:    From [place]: Take [address]. Turn left at [address]. Turn left at the first stoplight on [address]. At the first stop sign, take a right onto [address]. Take a left into the parking lot and continue through until you reach the North enterance of the building.       From [place]: Take [address]. Take the [address] ramp towards [place]. Turn left onto [address]. Take a slight right onto [address]. Turn left at the first stoplight on [address]. At the first stop sign, take a right onto [address]. Take a left into the parking lot and continue through until you reach the North enterance of the building.       From [place]: Take [address]. Take a right at [address]. At the first stop sign, take a right onto [address]. Take a left into the parking lot and continue through until you reach the North enterance of the building.         Discharge Instructions         General Neck and Back  Pain    Both neck and back pain are usually caused by injury to the muscles or ligaments of the spine. Sometimes the disks that separate each bone of the spine may cause pain by pressing on a nearby nerve. Back and neck pain may appear after a sudden twisting or bending force (such as in a car accident), or sometimes after a simple awkward movement. In either case, muscle spasm is often present and adds to the pain.  Acute neck and back pain usually gets better in 1 to 2 weeks. Pain related to disk disease, arthritis in the spinal joints or spinal stenosis (narrowing of the spinal canal) can become chronic and last for months or years.  Back and neck pain are common problems. Most people feel better in 1 or 2 weeks, and most of the rest in 1 to 2 months. Most people can remain active.  People have and describe pain differently.    Pain can be sharp, stabbing, shooting, aching, cramping, or burning    Movement, standing, bending, lifting, sitting, or walking may worsen the pain    Pain can be localized to one spot or area, or it can be more generalized    Pain can spread or radiate upwards, downwards, to the front, or go down your arms    Muscle spasm may occur.  Most of the time mechanical problems with the muscles or spine cause the pain. it is usually caused by an injury, whether known or not, to the muscles or ligaments. While illnesses can cause back pain, it is usually not caused by a serious illness. Pain is usually related to physical activity, whether sports, exercise, work, or normal activity. Sometimes it can occur without an identifiable cause. This can happen simply by stretching or moving wrong, without noting pain at the time. Other causes include:    Overexertion, lifting, pushing, pulling incorrectly or too aggressively.    Sudden twisting, bending or stretching from an accident (car or fall), or accidental movement.    Poor posture    Poor conditioning, lack of regular exercise    Spinal disc  disease or arthritis    Stress    Pregnancy, or illness like appendicitis, bladder or kidney infection, pelvic infections   Home care    For neck pain: Use a comfortable pillow that supports the head and keeps the spine in a neutral position. The position of the head should not be tilted forward or backward.    When in bed, try to find a position of comfort. A firm mattress is best. Try lying flat on your back with pillows under your knees. You can also try lying on your side with your knees bent up towards your chest and a pillow between your knees.    At first, do not try to stretch out the sore spots. If there is a strain, it is not like the good soreness you get after exercising without an injury. In this case, stretching may make it worse.    Don't sit for long periods, as in long car rides or other travel. This puts more stress on the lower back than standing or walking.    During the first 24 to 72 hours after an injury, apply an ice pack to the painful area for 20 minutes and then remove it for 20 minutes over a period of 60 to 90 minutes or several times a day.     You can alternate ice and heat therapies. Talk with your healthcare provider about the best treatment for your back or neck pain. As a safety precaution, do not use a heating pad at bedtime. Sleeping with a heating pad can lead to skin burns or tissue damage.    Therapeutic massage can help relax the back and neck muscles without stretching them.    Be aware of safe lifting methods and do not lift anything over 15 pounds until all the pain is gone.  Medicines  Talk to your healthcare provider before using medicine, especially if you have other medical problems or are taking other medicines.    You may use over-the-counter medicine to control pain, unless another pain medicine was prescribed. If you have chronic conditions like diabetes, liver or kidney disease, stomach ulcers,  gastrointestinal bleeding, or are taking blood thinner  medicines.    Be careful if you are given pain medicines, narcotics, or medicine for muscle spasm. They can cause drowsiness, and can affect your coordination, reflexes, and judgment. Do not drive or operate heavy machinery.  Follow-up care  Follow up with your healthcare provider, or as advised. Physical therapy or further tests may be needed.  If X-rays were taken, you will be notified of any new findings that may affect your care.  Call 911  Call 911 if any of the following occur:    Trouble breathing    Confusion    Very drowsy or trouble awakening    Fainting or loss of consciousness    Rapid or very slow heart rate    Loss of bowel or bladder control  When to seek medical advice  Call your healthcare provider right away if any of these occur:    Pain becomes worse or spreads into your arms or legs    Weakness, numbness or pain in one or both arms or legs    Numbness in the groin area    Difficulty walking    Fever of 100.4 F (38 C) or higher, or as directed by your healthcare provider  Date Last Reviewed: 7/1/2016 2000-2017 [employer]. [address] [place] [address]. All rights reserved. This information is not intended as a substitute for professional medical care. Always follow your healthcare professional's instructions.          Degenerative Disk Disease    Spinal disks are gel-filled cushions between the bones, or vertebrae, of the spine. The disks act like shock absorbers. Over time, the disks may break down. This is called degenerative disk disease.  This condition can affect the neck or back. It is one of the most common causes of low back pain. It is the leading cause of disability in people under age 45 in the [place]. The pain often remains localized to the lower back or neck. Muscle spasm is often present and adds to the pain.  Disk degeneration is a natural part of aging. But it is not painful for most people. It may also occur as a result of repeated minor  injuries due to daily activities, sports, or accidents. It may lead to osteoarthritis of the spine. Back pain related to disk disease may come and go. Or it may become chronic and last for months or years. The disk may bulge or rupture. This is called a slipped disk or herniated disk. That can put pressure on a nearby spinal nerve and cause neck or back pain that spreads down one arm or leg.  X-rays or an MRI may help to diagnose this condition. For acute pain, treatment includes anti-inflammatory medicines, muscle relaxants, rest, ice, or heat. Strong prescription pain medicines, called opioids, may be needed for short-term treatment if pain suddenly gets worse. Opioid medicines can be addictive. So they are not advised for long-term pain management. Other types of medicines are preferred. Surgery is generally not used to treat this condition unless there is a complication.    Home care    For neck pain: Use a comfortable pillow that supports the head and keeps the spine in a neutral position. Your head should not be tilted forward or backward.    For back pain: Avoid sitting for long periods of time. This puts more stress on the lower back than standing or walking. Starting a regular exercise program to strengthen the supporting muscles of the spine will make it easier to live with degenerative disk disease.    Apply an ice pack over the injured area for no more than 15 to 20 minutes. Do this every 3 to 6 hours for the first 24 to 48 hours. To make an ice pack, put ice cubes in a plastic bag that seals at the top. Wrap the bag in a clean, thin towel or cloth. Never put ice or an ice pack directly on the skin. Keep using ice packs to ease pain and swelling as needed. After 48 hours, apply heat (warm shower or warm bath) for 20 minutes several times a day, or switch between ice and heat.     You may use over-the-counter pain medicine to control pain, unless another pain medicine was prescribed. If you have chronic  liver or kidney disease or ever had a stomach ulcer or GI bleeding, talk with your provider before using these medicines.  Follow-up care  Follow up with your healthcare provider, or as directed.  If X-rays, a CT scan or an MRI were done, you will be notified of any new findings that may affect your care.  When to seek medical advice  Contact your healthcare provider right away if any of these occur:    Increasing back pain    Your foot drags when you walk, a condition called foot drop    You have new weakness, numbness, or pain in one or both arms or legs    Loss of bowel or bladder control    Numbness or tingling in the buttock or groin area  Date Last Reviewed: 11/23/2015 2000-2017 [employer]. [address] [place] [address], [place] [id number]. All rights reserved. This information is not intended as a substitute for professional medical care. Always follow your healthcare professional's instructions.            Your next 10 appointments already scheduled     Jul 13, 2018 11:30 AM CDT   (Arrive by 11:15 AM)   SHORT with Bonny Valdes MD   [facility] ([facility]    [address] [facility] [address]   [phone]                 Review of your medicines      START taking        Dose / Directions Last dose taken    diazepam 5 MG tablet   Commonly known as:  VALIUM   Dose:  5-10 mg   Quantity:  15 tablet        Take 1-2 tablets (5-10 mg) by mouth every 8 hours as needed for muscle spasms   Refills:  0        ketorolac 10 MG tablet   Commonly known as:  TORADOL   Dose:  10 mg   Quantity:  20 tablet        Take 1 tablet (10 mg) by mouth every 6 hours as needed   Refills:  0          Our records show that you are taking the medicines listed below. If these are incorrect, please call your family doctor or clinic.        Dose / Directions Last dose taken    FLEXERIL PO   Dose:  5 mg        Take 5 mg by mouth 3 times daily as needed for muscle spasms    Refills:  0        GABAPENTIN PO   Dose:  300 mg        Take 300 mg by mouth 3 times daily   Refills:  0        ibuprofen 800 MG tablet   Commonly known as:  ADVIL/MOTRIN   Dose:  800 mg   Quantity:  100 tablet        Take 1 tablet (800 mg) by mouth every 8 hours as needed for moderate pain   Refills:  0        levonorgestrel 20 MCG/24HR IUD   Commonly known as:  MIRENA   Dose:  1 each        1 each (20 mcg) by Intrauterine route continuous   Refills:  0        NAPROXEN PO        Take by mouth 2 times daily as needed   Refills:  0        OXcarbazepine 300 MG tablet   Commonly known as:  TRILEPTAL   Quantity:  60 tablet        TAKE 1 TABLET(300 MG) BY MOUTH TWICE DAILY   Refills:  1        TYLENOL PO   Dose:  1000 mg        Take 1,000 mg by mouth every 6 hours as needed for mild pain or fever   Refills:  0        XANAX PO   Dose:  1 mg        Take 1 mg by mouth 2 times daily   Refills:  0                Prescriptions were sent or printed at these locations (2 Prescriptions)                   [facility] [address] [place] [address] [address] [address] [facility] [address]   [address], [facility] [address]    Telephone:  [phone]   Fax:  [phone]   Hours:                  E-Prescribed (1 of 2)         ketorolac (TORADOL) 10 MG tablet                 Printed at Department/Unit printer (1 of 2)         diazepam (VALIUM) 5 MG tablet                Procedures and tests performed during your visit     XR Cervical Spine G/E 4 Views      Orders Needing Specimen Collection     None      Pending Results     No orders found from 7/4/2018 to 7/7/2018.            Pending Culture Results     No orders found from 7/4/2018 to 7/7/2018.            Thank you for choosing [facility]       Thank you for choosing [facility] for your care. Our goal is always to provide you with excellent care. Hearing back from our patients is one way we can continue to improve our services. Please take a few minutes to complete the written  survey that you may receive in the mail after you visit with us. Thank you!        [facility] Information     [employer] gives you secure access to your electronic health record. If you see a primary care provider, you can also send messages to your care team and make appointments. If you have questions, please call your primary care clinic.  If you do not have a primary care provider, please call [phone] and they will assist you.        Care EveryWhere ID     This is your Care EveryWhere ID. This could be used by other organizations to access your [facility] [place] medical records  [id number]        Equal Access to Services     [facility] : Ai Russell, orin cortés, tino fischer, mackenzie fam. So [facility] [phone].    ATENCIÓN: Si habla español, tiene a morgan disposición servicios gratuitos de asistencia lingüística. Samantha al [phone].    We comply with applicable federal civil rights laws and [place] laws. We do not discriminate on the basis of race, color, national origin, age, disability, sex, sexual orientation, or gender identity.            After Visit Summary       This is your record. Keep this with you and show to your community pharmacist(s) and doctor(s) at your next visit.

## 2018-07-06 NOTE — ED TRIAGE NOTES
"Pt presents today with c/o left sided neck pain x3 weeks. Pt states she has been taking tylenol, ibuprofen, and flexeril, with no relief. Pt states she was dx \"with bulging disks years ago, but I never went back in after that\"  "

## 2018-07-06 NOTE — ED AVS SNAPSHOT
[facility]    [address] [address]    Phone:  [phone]                                       Milla Bose   MRN: [id number]    Department[facility]  [facility]   Date of Visit:  7/6/2018           After Visit Summary Signature Page     I have received my discharge instructions, and my questions have been answered. I have discussed any challenges I see with this plan with the nurse or doctor.    ..........................................................................................................................................  Patient/Patient Representative Signature      ..........................................................................................................................................  Patient Representative Print Name and Relationship to Patient    ..................................................               ................................................  Date                                            Time    ..........................................................................................................................................  Reviewed by Signature/Title    ...................................................              ..............................................  Date                                                            Time

## 2018-07-10 ENCOUNTER — APPOINTMENT (OUTPATIENT)
Dept: CT IMAGING | Facility: HOSPITAL | Age: 38
End: 2018-07-10
Attending: NURSE PRACTITIONER
Payer: MEDICARE

## 2018-07-10 ENCOUNTER — HOSPITAL ENCOUNTER (EMERGENCY)
Facility: HOSPITAL | Age: 38
Discharge: HOME | End: 2018-07-10
Attending: NURSE PRACTITIONER | Admitting: NURSE PRACTITIONER
Payer: MEDICARE

## 2018-07-10 VITALS
DIASTOLIC BLOOD PRESSURE: 59 MMHG | SYSTOLIC BLOOD PRESSURE: 131 MMHG | TEMPERATURE: 97.4 F | OXYGEN SATURATION: 100 % | RESPIRATION RATE: 14 BRPM

## 2018-07-10 DIAGNOSIS — M62.838 MUSCLE SPASM: ICD-10-CM

## 2018-07-10 DIAGNOSIS — M54.2 CERVICALGIA: ICD-10-CM

## 2018-07-10 LAB
BASOPHILS # BLD AUTO: 0.1 10E9/L (ref 0–0.2)
BASOPHILS NFR BLD AUTO: 0.5 %
CRP SERPL-MCNC: <2.9 MG/L (ref 0–8)
DIFFERENTIAL METHOD BLD: ABNORMAL
EOSINOPHIL # BLD AUTO: 0.3 10E9/L (ref 0–0.7)
EOSINOPHIL NFR BLD AUTO: 2.8 %
ERYTHROCYTE [DISTWIDTH] IN BLOOD BY AUTOMATED COUNT: 14.2 % (ref 10–15)
ERYTHROCYTE [SEDIMENTATION RATE] IN BLOOD BY WESTERGREN METHOD: 8 MM/H (ref 0–20)
HCT VFR BLD AUTO: 46.6 % (ref 35–47)
HGB BLD-MCNC: 15.4 G/DL (ref 11.7–15.7)
IMM GRANULOCYTES # BLD: 0 10E9/L (ref 0–0.4)
IMM GRANULOCYTES NFR BLD: 0.4 %
LYMPHOCYTES # BLD AUTO: 2.1 10E9/L (ref 0.8–5.3)
LYMPHOCYTES NFR BLD AUTO: 22.2 %
MCH RBC QN AUTO: 28.9 PG (ref 26.5–33)
MCHC RBC AUTO-ENTMCNC: 33 G/DL (ref 31.5–36.5)
MCV RBC AUTO: 87 FL (ref 78–100)
MONOCYTES # BLD AUTO: 0.6 10E9/L (ref 0–1.3)
MONOCYTES NFR BLD AUTO: 6.3 %
NEUTROPHILS # BLD AUTO: 6.3 10E9/L (ref 1.6–8.3)
NEUTROPHILS NFR BLD AUTO: 67.8 %
NRBC # BLD AUTO: 0 10*3/UL
NRBC BLD AUTO-RTO: 0 /100
PLATELET # BLD AUTO: 214 10E9/L (ref 150–450)
RBC # BLD AUTO: 5.33 10E12/L (ref 3.8–5.2)
WBC # BLD AUTO: 9.3 10E9/L (ref 4–11)

## 2018-07-10 PROCEDURE — G0463 HOSPITAL OUTPT CLINIC VISIT: HCPCS

## 2018-07-10 PROCEDURE — 72125 CT NECK SPINE W/O DYE: CPT | Mod: TC

## 2018-07-10 PROCEDURE — 99214 OFFICE O/P EST MOD 30 MIN: CPT | Performed by: NURSE PRACTITIONER

## 2018-07-10 PROCEDURE — 36415 COLL VENOUS BLD VENIPUNCTURE: CPT | Performed by: NURSE PRACTITIONER

## 2018-07-10 PROCEDURE — 86140 C-REACTIVE PROTEIN: CPT | Performed by: NURSE PRACTITIONER

## 2018-07-10 PROCEDURE — 25000125 ZZHC RX 250: Performed by: NURSE PRACTITIONER

## 2018-07-10 PROCEDURE — 85652 RBC SED RATE AUTOMATED: CPT | Performed by: NURSE PRACTITIONER

## 2018-07-10 PROCEDURE — 85025 COMPLETE CBC W/AUTO DIFF WBC: CPT | Performed by: NURSE PRACTITIONER

## 2018-07-10 PROCEDURE — G0463 HOSPITAL OUTPT CLINIC VISIT: HCPCS | Mod: 25

## 2018-07-10 RX ORDER — PREDNISONE 20 MG/1
TABLET ORAL
Qty: 10 TABLET | Refills: 0 | Status: SHIPPED | OUTPATIENT
Start: 2018-07-10 | End: 2018-09-19

## 2018-07-10 RX ORDER — DEXAMETHASONE SODIUM PHOSPHATE 10 MG/ML
10 INJECTION, SOLUTION INTRAMUSCULAR; INTRAVENOUS ONCE
Status: COMPLETED | OUTPATIENT
Start: 2018-07-10 | End: 2018-07-10

## 2018-07-10 RX ADMIN — DEXAMETHASONE SODIUM PHOSPHATE 10 MG: 10 INJECTION, SOLUTION INTRAMUSCULAR; INTRAVENOUS at 15:18

## 2018-07-10 NOTE — ED AVS SNAPSHOT
[facility]    [address] [place] [address]    Phone:  [phone]                                       Milla Bose   MRN: [id number]    Department[facility]   Date of Visit:  7/10/2018           After Visit Summary Signature Page     I have received my discharge instructions, and my questions have been answered. I have discussed any challenges I see with this plan with the nurse or doctor.    ..........................................................................................................................................  Patient/Patient Representative Signature      ..........................................................................................................................................  Patient Representative Print Name and Relationship to Patient    ..................................................               ................................................  Date                                            Time    ..........................................................................................................................................  Reviewed by Signature/Title    ...................................................              ..............................................  Date                                                            Time

## 2018-07-10 NOTE — DISCHARGE INSTRUCTIONS
Take prednisone as ordered. Take in the morning. Do not take NSAIDS (ibupofen, etc..) while taking.   Take tylenol as needed for pain.   Continue valium as needed as ordered. Do not drive or participate in activities that require alertness while taking.   Use a heating pad to neck muscles for 20 minutes every 1-2 hours. Protect skin.   Follow up with PCP this week or early next wee for re-evaluation.   Return to urgent care or emergency department with any increase in symptoms or concerns.

## 2018-07-10 NOTE — ED PROVIDER NOTES
History     Chief Complaint   Patient presents with     Neck Pain     c/o neck pain and seen on friday for the same. requesting pain medications     The history is provided by the patient. No  was used.     Milla Bose is a 38 year old female who presents with neck pain that started a few weeks ago. She is tearful. Denies injury or trauma. She's taken Toradol and Valium with minimal effectiveness. Today is the worse her neck has felt. Denies numbness or tingling down arms. Denies fever, chills, or night sweats. Eating and drinking well. Bowel and bladder are working well. No tick bites.     She was seen in urgent care on 7-6-18. She was prescribed Valium and Toradol.       Problem List:    Patient Active Problem List    Diagnosis Date Noted     Schizoaffective disorder (H) 07/03/2012     Priority: Medium     PTSD (post-traumatic stress disorder) 07/03/2012     Priority: Medium     Anxiety state 07/03/2012     Priority: Medium     Panic disorder with agoraphobia 07/03/2012     Priority: Medium     Major depression 08/03/2011     Priority: Medium     Overview:   IMO Update 10/11       Personality, multiple 08/03/2011     Priority: Medium     Overview:   IMO Update 10/11       Astigmatism 08/31/2009     Priority: Medium     Overview:   IMO Update       Myopia 08/31/2009     Priority: Medium     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     Priority: Medium     Overview:   IMO Update 10/11          Past Medical History:    Past Medical History:   Diagnosis Date     Anxiety 07/03/2012     Asthma,unspecified type, unspecified 07/03/2012     Panic disorder with agoraphobia 07/03/2012     PTSD (post-traumatic stress disorder) 07/03/2012     Schizoaffective disorder 07/03/2012       Past Surgical History:    Past Surgical History:   Procedure Laterality Date     cyst removal left breast       cyst removed from right ear       GYN SURGERY      laser of cervix       Family History:    Family History   Problem  Relation Age of Onset     HEART DISEASE Mother      Alcohol/Drug Mother      Unknown/Adopted Father      Cancer Paternal Grandmother      Cervical       Social History:  Marital Status:  Single [1]  Social History   Substance Use Topics     Smoking status: Current Every Day Smoker     Packs/day: 0.50     Years: 20.00     Types: Cigarettes     Smokeless tobacco: Never Used     Alcohol use Yes      Comment: rare        Medications:      predniSONE (DELTASONE) 20 MG tablet   Acetaminophen (TYLENOL PO)   ALPRAZolam (XANAX PO)   Cyclobenzaprine HCl (FLEXERIL PO)   diazepam (VALIUM) 5 MG tablet   GABAPENTIN PO   ibuprofen (ADVIL,MOTRIN) 800 MG tablet   ketorolac (TORADOL) 10 MG tablet   levonorgestrel (MIRENA) 20 MCG/24HR IUD   NAPROXEN PO   OXcarbazepine (TRILEPTAL) 300 MG tablet         Review of Systems   Constitutional: Positive for activity change. Negative for appetite change, chills, diaphoresis, fatigue and fever.   HENT: Negative for congestion, ear discharge, ear pain, postnasal drip, rhinorrhea, sinus pain, sinus pressure, sore throat, trouble swallowing and voice change.    Respiratory: Negative for cough, shortness of breath, wheezing and stridor.    Cardiovascular: Negative for chest pain.   Gastrointestinal: Negative for abdominal pain, diarrhea, nausea and vomiting.   Genitourinary: Negative for dysuria.        Denies chance of pregnancy.    Musculoskeletal: Positive for neck pain and neck stiffness. Negative for back pain.   Skin: Negative for rash and wound.   Neurological: Positive for headaches. Negative for weakness.        Dull headache this am she feels is from not wearing her eye glasses.    Psychiatric/Behavioral: Negative.        Physical Exam   BP: 131/59  Heart Rate: 100  Temp: 97.4  F (36.3  C)  Resp: 14  SpO2: 100 %      Physical Exam   Constitutional: She is oriented to person, place, and time. She appears well-developed and well-nourished. She appears distressed.   Tearful.    HENT:   Head:  Normocephalic.   Right Ear: External ear normal.   Left Ear: External ear normal.   Mouth/Throat: Oropharynx is clear and moist. No oropharyngeal exudate.   Eyes: Conjunctivae and EOM are normal. Pupils are equal, round, and reactive to light. Right eye exhibits no discharge. Left eye exhibits no discharge.   Neck: Neck supple.   C3-C6 neck tenderness on palpation. Cervical paraspinal muscles sore. Bilateral scapula muscle tenderness. ROM intact to neck. Guards with movement with pain.    Cardiovascular: Normal rate, regular rhythm, normal heart sounds and intact distal pulses.    No murmur heard.  Pulmonary/Chest: Effort normal. No respiratory distress. She has no wheezes. She has no rales.   Abdominal: Soft. She exhibits no distension.   Musculoskeletal: She exhibits tenderness. She exhibits no edema or deformity.   CMS and ROM intact to all extremities. Distal pulses intact. Pain IS reproducible to C3-C6. No step offs to spinal column. Cervical spinal muscles tenderness.    Lymphadenopathy:     She has no cervical adenopathy.   Neurological: She is alert and oriented to person, place, and time. She displays normal reflexes. She exhibits normal muscle tone. Coordination normal.   CN II-XII grossly intact. Negative Kernig's and Brudzinski's sign.    Skin: Skin is warm and dry. No rash noted. She is not diaphoretic. No erythema.   No rash, erythema, bruising, or warmth to the touch to posterior neck/back.    Psychiatric: She has a normal mood and affect. Her behavior is normal.   Nursing note and vitals reviewed.      ED Course     ED Course     Procedures    Results for orders placed or performed during the hospital encounter of 07/10/18 (from the past 24 hour(s))   CBC with platelets differential   Result Value Ref Range    WBC 9.3 4.0 - 11.0 10e9/L    RBC Count 5.33 (H) 3.8 - 5.2 10e12/L    Hemoglobin 15.4 11.7 - 15.7 g/dL    Hematocrit 46.6 35.0 - 47.0 %    MCV 87 78 - 100 fl    MCH 28.9 26.5 - 33.0 pg    MCHC  33.0 31.5 - 36.5 g/dL    RDW 14.2 10.0 - 15.0 %    Platelet Count 214 150 - 450 10e9/L    Diff Method Automated Method     % Neutrophils 67.8 %    % Lymphocytes 22.2 %    % Monocytes 6.3 %    % Eosinophils 2.8 %    % Basophils 0.5 %    % Immature Granulocytes 0.4 %    Nucleated RBCs 0 0 /100    Absolute Neutrophil 6.3 1.6 - 8.3 10e9/L    Absolute Lymphocytes 2.1 0.8 - 5.3 10e9/L    Absolute Monocytes 0.6 0.0 - 1.3 10e9/L    Absolute Eosinophils 0.3 0.0 - 0.7 10e9/L    Absolute Basophils 0.1 0.0 - 0.2 10e9/L    Abs Immature Granulocytes 0.0 0 - 0.4 10e9/L    Absolute Nucleated RBC 0.0    CRP inflammation   Result Value Ref Range    CRP Inflammation <2.9 0.0 - 8.0 mg/L   Erythrocyte sedimentation rate auto   Result Value Ref Range    Sed Rate 8 0 - 20 mm/h   CT Cervical Spine w/o Contrast    Narrative    PROCEDURE: CT CERVICAL SPINE W/O CONTRAST    HISTORY:  Increased neck pain.;      TECHNIQUE: Helical CT images of the cervical spine were obtained  without contrast. Coronal and sagittal reformats were provided.    COMPARISON: X-ray 7/6/2018    FINDINGS:     No acute fracture or subluxation is identified. There is normal  alignment and curvature. The vertebral bodies are maintained. Disc  spaces are narrowed at multiple levels, most severe at C5-6 and C6-7.  There are associated degenerative endplate changes and osteophytes.  This produces mild bilateral neural foraminal narrowing at C5-6. There  is also multilevel facet arthrosis       The prevertebral soft tissues are unremarkable. The lung apices are  clear.      Impression    IMPRESSION:  No evidence of traumatic cervical spine injury.  Multilevel degenerative disease, most severe at C5-6.    LAITH WESTFALL MD       Medications   dexamethasone (DECADRON) oral solution (inj used orally) 10 mg (10 mg Oral Given 7/10/18 7560)     She deferred a Toradol injection.     Assessments & Plan (with Medical Decision Making)     She's taken Prednisone in the past for asthma  and tolerated well.     She was encouraged to follow up with PCP for further management of neck pain and further recommendation. She has a scheduled appointment in 3 days.     Discussed plan of care. She verbalized understanding. All questions answered.     I have reviewed the nursing notes.    I have reviewed the findings, diagnosis, plan and need for follow up with the patient.  Discharged in stable condition.     New Prescriptions    PREDNISONE (DELTASONE) 20 MG TABLET    Take two tablets (= 40mg) each day for 5 (five) days       Final diagnoses:   Cervicalgia   Muscle spasm     Take prednisone as ordered. Take in the morning. Do not take NSAIDS (ibupofen, etc..) while taking.   Take tylenol as needed for pain.   Continue valium as needed as ordered. Do not drive or participate in activities that require alertness while taking.   Use a heating pad to neck muscles for 20 minutes every 1-2 hours. Protect skin.   Follow up with PCP this week or early next week for re-evaluation. Keep scheduled appointment.   Return to urgent care or emergency department with any increase in symptoms or concerns.     MORE Locke  7/10/2018  5:10 PM  URGENT CARE CLINIC       Jackie Alford NP  07/11/18 0741

## 2018-07-10 NOTE — ED TRIAGE NOTES
Pt presents today with her kids with c/o neck pain. Pt was just seen a few days ago for same problem. Pt states the meds helped a little but now she is having even worse pain. Pt unable to move neck fully side to side. Pt is tearful.

## 2018-07-10 NOTE — ED AVS SNAPSHOT
[facility]    [address]    [facility] [address]    Phone:  [phone]                                       Milla Bose   MRN: [id number]    Department:  [facility]   Date of Visit:  7/10/2018           Patient Information     Date Of Birth          1980        Your diagnoses for this visit were:     Cervicalgia     Muscle spasm        You were seen by Jackie Alford NP.      Follow-up Information     Follow up with Bonny Valdes MD In 1 week.    Specialty:  Family Practice    Why:  Or sooner with any increase in symptoms or concerns.     Contact information:    [address] [facility] [address] [phone]          Follow up with [facility].    Specialty:  EMERGENCY MEDICINE    Why:  As needed, If symptoms worsen    Contact information:    [address]  [facility] [address] [phone]    Additional information:    From [place]: Take [address]. Turn left at [address]. Turn left at the first stoplight on [address]. At the first stop sign, take a right onto [address]. Take a left into the parking lot and continue through until you reach the North enterance of the building.       From [place]: Take [address]. Take the [address] ramp towards [place]. Turn left onto [address]. Take a slight right onto [address]. Turn left at the first stoplight on [address]. At the first stop sign, take a right onto [address]. Take a left into the parking lot and continue through until you reach the North enterance of the building.       From [place]: Take [address]. Take a right at [address]. At the first stop sign, take a right onto [address]. Take a left into the parking lot and continue through until you reach the North enterance of the building.         Discharge Instructions       Take prednisone as ordered. Take in the  morning. Do not take NSAIDS (ibupofen, etc..) while taking.   Take tylenol as needed for pain.   Continue valium as needed as ordered. Do not drive or participate in activities that require alertness while taking.   Use a heating pad to neck muscles for 20 minutes every 1-2 hours. Protect skin.   Follow up with PCP this week or early next wee for re-evaluation.   Return to urgent care or emergency department with any increase in symptoms or concerns.     Discharge References/Attachments     BACK SPASM, NO TRAUMA (ENGLISH)    NECK PAIN (ENGLISH)    NECK EXERCISES: NECK ISOMETRICS (ENGLISH)      Your next 10 appointments already scheduled     Jul 12, 2018  2:00 PM CDT   (Arrive by 1:45 PM)   PHYSICAL with JEFFERSON Weinberg CNM   [facility] ([facility] )    [address] [id number] [phone]            Jul 13, 2018 11:30 AM CDT   (Arrive by 11:15 AM)   SHORT with Bonny Valdes MD   [facility] ([facility] )    [address] [address]   [phone]                 Review of your medicines      START taking        Dose / Directions Last dose taken    predniSONE 20 MG tablet   Commonly known as:  DELTASONE   Quantity:  10 tablet        Take two tablets (= 40mg) each day for 5 (five) days   Refills:  0          Our records show that you are taking the medicines listed below. If these are incorrect, please call your family doctor or clinic.        Dose / Directions Last dose taken    diazepam 5 MG tablet   Commonly known as:  VALIUM   Dose:  5-10 mg   Quantity:  15 tablet        Take 1-2 tablets (5-10 mg) by mouth every 8 hours as needed for muscle spasms   Refills:  0        FLEXERIL PO   Dose:  5 mg        Take 5 mg by mouth 3 times daily as needed for muscle spasms   Refills:  0        GABAPENTIN PO   Dose:  300 mg        Take 300 mg by mouth 3 times daily   Refills:  0         ibuprofen 800 MG tablet   Commonly known as:  ADVIL/MOTRIN   Dose:  800 mg   Quantity:  100 tablet        Take 1 tablet (800 mg) by mouth every 8 hours as needed for moderate pain   Refills:  0        ketorolac 10 MG tablet   Commonly known as:  TORADOL   Dose:  10 mg   Quantity:  20 tablet        Take 1 tablet (10 mg) by mouth every 6 hours as needed   Refills:  0        levonorgestrel 20 MCG/24HR IUD   Commonly known as:  MIRENA   Dose:  1 each        1 each (20 mcg) by Intrauterine route continuous   Refills:  0        NAPROXEN PO        Take by mouth 2 times daily as needed   Refills:  0        OXcarbazepine 300 MG tablet   Commonly known as:  TRILEPTAL   Quantity:  60 tablet        TAKE 1 TABLET(300 MG) BY MOUTH TWICE DAILY   Refills:  1        TYLENOL PO   Dose:  1000 mg        Take 1,000 mg by mouth every 6 hours as needed for mild pain or fever   Refills:  0        XANAX PO   Dose:  1 mg        Take 1 mg by mouth 2 times daily   Refills:  0                Prescriptions were sent or printed at these locations (1 Prescription)                   [facility] [address] [place] [address] - [address] AT [facility] [address]   [address], [facility] [address]    Telephone:  [phone]   Fax:  [phone]   Hours:                  E-Prescribed (1 of 1)         predniSONE (DELTASONE) 20 MG tablet                Procedures and tests performed during your visit     CBC with platelets differential    CRP inflammation    CT Cervical Spine w/o Contrast    Erythrocyte sedimentation rate auto      Orders Needing Specimen Collection     None      Pending Results     No orders found from 7/8/2018 to 7/11/2018.            Pending Culture Results     No orders found from 7/8/2018 to 7/11/2018.            Thank you for choosing [facility]       Thank you for choosing [facility] for your care. Our goal is always to provide you with excellent care. Hearing back from our patients is one way we can continue to improve our  services. Please take a few minutes to complete the written survey that you may receive in the mail after you visit with us. Thank you!        [employer] Information     [employer] gives you secure access to your electronic health record. If you see a primary care provider, you can also send messages to your care team and make appointments. If you have questions, please call your primary care clinic.  If you do not have a primary care provider, please call [phone] and they will assist you.        Care EveryWhere ID     This is your Care EveryWhere ID. This could be used by other organizations to access your [place] medical records  [id number]        Equal Access to Services     FLORES ROONEY : Ai Russell, orin cortés, mackenzie mays . So [facility] [phone].    ATENCIÓN: Si habla español, tiene a morgan disposición servicios gratuitos de asistencia lingüística. Llame al [phone].    We comply with applicable federal civil rights laws and [place] laws. We do not discriminate on the basis of race, color, national origin, age, disability, sex, sexual orientation, or gender identity.            After Visit Summary       This is your record. Keep this with you and show to your community pharmacist(s) and doctor(s) at your next visit.

## 2018-07-11 ASSESSMENT — ENCOUNTER SYMPTOMS
NECK STIFFNESS: 1
WEAKNESS: 0
COUGH: 0
FEVER: 0
VOMITING: 0
DYSURIA: 0
BACK PAIN: 0
VOICE CHANGE: 0
NECK PAIN: 1
DIAPHORESIS: 0
ABDOMINAL PAIN: 0
HEADACHES: 1
WHEEZING: 0
SORE THROAT: 0
NAUSEA: 0
SINUS PRESSURE: 0
SINUS PAIN: 0
WOUND: 0
TROUBLE SWALLOWING: 0
FATIGUE: 0
ACTIVITY CHANGE: 1
RHINORRHEA: 0
STRIDOR: 0
SHORTNESS OF BREATH: 0
CHILLS: 0
APPETITE CHANGE: 0
PSYCHIATRIC NEGATIVE: 1
DIARRHEA: 0

## 2018-08-27 DIAGNOSIS — B35.4 RINGWORM OF BODY: ICD-10-CM

## 2018-08-27 NOTE — TELEPHONE ENCOUNTER
[facility]      Last Written Prescription Date:    Last Fill Quantity: ,   # refills:   Last Office Visit: 3/15/18  Future Office visit:         Not on med list

## 2018-08-28 RX ORDER — KETOCONAZOLE 20 MG/G
CREAM TOPICAL
Qty: 60 G | Refills: 0 | Status: SHIPPED | OUTPATIENT
Start: 2018-08-28 | End: 2018-09-19

## 2018-09-19 ENCOUNTER — OFFICE VISIT (OUTPATIENT)
Dept: FAMILY MEDICINE | Facility: OTHER | Age: 38
End: 2018-09-19
Attending: FAMILY MEDICINE
Payer: MEDICARE

## 2018-09-19 VITALS
SYSTOLIC BLOOD PRESSURE: 128 MMHG | DIASTOLIC BLOOD PRESSURE: 82 MMHG | RESPIRATION RATE: 16 BRPM | BODY MASS INDEX: 24.56 KG/M2 | WEIGHT: 130 LBS | TEMPERATURE: 96.9 F | OXYGEN SATURATION: 98 % | HEART RATE: 108 BPM

## 2018-09-19 DIAGNOSIS — M67.40 GANGLION CYST: ICD-10-CM

## 2018-09-19 DIAGNOSIS — M54.2 CERVICALGIA: Primary | ICD-10-CM

## 2018-09-19 PROCEDURE — 99213 OFFICE O/P EST LOW 20 MIN: CPT | Performed by: FAMILY MEDICINE

## 2018-09-19 PROCEDURE — G0463 HOSPITAL OUTPT CLINIC VISIT: HCPCS

## 2018-09-19 RX ORDER — IBUPROFEN 800 MG/1
800 TABLET, FILM COATED ORAL EVERY 8 HOURS PRN
Qty: 100 TABLET | Refills: 0 | Status: SHIPPED | OUTPATIENT
Start: 2018-09-19 | End: 2018-10-20

## 2018-09-19 RX ORDER — KETOROLAC TROMETHAMINE 10 MG/1
10 TABLET, FILM COATED ORAL EVERY 6 HOURS PRN
Qty: 20 TABLET | Refills: 0 | Status: CANCELLED | OUTPATIENT
Start: 2018-09-19

## 2018-09-19 ASSESSMENT — ANXIETY QUESTIONNAIRES
5. BEING SO RESTLESS THAT IT IS HARD TO SIT STILL: MORE THAN HALF THE DAYS
7. FEELING AFRAID AS IF SOMETHING AWFUL MIGHT HAPPEN: NEARLY EVERY DAY
GAD7 TOTAL SCORE: 20
3. WORRYING TOO MUCH ABOUT DIFFERENT THINGS: NEARLY EVERY DAY
2. NOT BEING ABLE TO STOP OR CONTROL WORRYING: NEARLY EVERY DAY
4. TROUBLE RELAXING: NEARLY EVERY DAY
6. BECOMING EASILY ANNOYED OR IRRITABLE: NEARLY EVERY DAY
IF YOU CHECKED OFF ANY PROBLEMS ON THIS QUESTIONNAIRE, HOW DIFFICULT HAVE THESE PROBLEMS MADE IT FOR YOU TO DO YOUR WORK, TAKE CARE OF THINGS AT HOME, OR GET ALONG WITH OTHER PEOPLE: VERY DIFFICULT
1. FEELING NERVOUS, ANXIOUS, OR ON EDGE: NEARLY EVERY DAY

## 2018-09-19 ASSESSMENT — PAIN SCALES - GENERAL: PAINLEVEL: SEVERE PAIN (7)

## 2018-09-19 NOTE — NURSING NOTE
"Chief Complaint   Patient presents with     UC Follow-Up     Mass       Initial /82 (BP Location: Right arm, Patient Position: Sitting, Cuff Size: Adult Regular)  Pulse 108  Temp 96.9  F (36.1  C) (Tympanic)  Resp 16  Wt 130 lb (59 kg)  SpO2 98%  BMI 24.56 kg/m2 Estimated body mass index is 24.56 kg/(m^2) as calculated from the following:    Height as of 3/15/18: 5' 1\" (1.549 m).    Weight as of this encounter: 130 lb (59 kg).  Medication Reconciliation: complete    Shagufta Lopez LPN    "

## 2018-09-19 NOTE — MR AVS SNAPSHOT
After Visit Summary   9/19/2018    Milla Bose    MRN: [id number]           Patient Information     Date Of Birth          1980        Visit Information        Provider Department      9/19/2018 2:45 PM Bonny Valdes MD [facility]        Today's Diagnoses     Cervicalgia    -  1    Ganglion cyst           Follow-ups after your visit        Additional Services     ORTHOPEDICS ADULT REFERRAL       Your provider has referred you to: Orthopedics at [facility]    Please be aware that coverage of these services is subject to the terms and limitations of your health insurance plan.  Call member services at your health plan with any benefit or coverage questions.      Please bring the following to your appointment:    >>   Any x-rays, CTs or MRIs which have been performed.  Contact the facility where they were done to arrange for  prior to your scheduled appointment.    >>   List of current medications   >>   This referral request   >>   Any documents/labs given to you for this referral                  Follow-up notes from your care team     Return if symptoms worsen or fail to improve.      Future tests that were ordered for you today     Open Future Orders        Priority Expected Expires Ordered    IR Consultation for IR Exam Routine  9/19/2019 9/19/2018            Who to contact     If you have questions or need follow up information about today's clinic visit or your schedule please contact [facility] directly at [phone].  Normal or non-critical lab and imaging results will be communicated to you by MyChart, letter or phone within 4 business days after the clinic has received the results. If you do not hear from us within 7 days, please contact the clinic through MyChart or phone. If you have a critical or abnormal lab result, we will notify you by phone as soon as possible.  Submit refill requests through [facility] or call your pharmacy  and they will forward the refill request to us. Please allow 3 business days for your refill to be completed.          Additional Information About Your Visit        [employer] Information     [employer] gives you secure access to your electronic health record. If you see a primary care provider, you can also send messages to your care team and make appointments. If you have questions, please call your primary care clinic.  If you do not have a primary care provider, please call [phone] and they will assist you.        Care EveryWhere ID     This is your Care EveryWhere ID. This could be used by other organizations to access your [place] medical records  [id number]        Your Vitals Were     Pulse Temperature Respirations Pulse Oximetry BMI (Body Mass Index)       108 96.9  F (36.1  C) (Tympanic) 16 98% 24.56 kg/m2        Blood Pressure from Last 3 Encounters:   09/19/18 128/82   07/10/18 131/59   07/06/18 131/68    Weight from Last 3 Encounters:   09/19/18 130 lb (59 kg)   03/15/18 136 lb (61.7 kg)   02/13/18 134 lb 6.4 oz (61 kg)              We Performed the Following     ORTHOPEDICS ADULT REFERRAL          Where to get your medicines      These medications were sent to [facility] [address] [facility], [place] - [address] AT [facility] [address]  [address], [facility] [address]     Phone:  [phone]     ibuprofen 800 MG tablet          Primary Care Provider Office Phone # Fax #    Bonny MIRANDA [facility] [phone] [address] [facility] [address]        Equal Access to Services     [facility]: Hadii aad ku hadasho Soomaali, waaxda luqadaha, qaybta kaalmada amado, mackenzie fam. So [facility] [phone].    ATENCIÓN: Si habla español, tiene a morgan disposición servicios gratuitos de asistencia lingüística. Llame al [phone].    We comply with applicable federal civil rights laws and [place] laws. We do not discriminate on the  basis of race, color, national origin, age, disability, sex, sexual orientation, or gender identity.            Thank you!     Thank you for choosing [facility]  for your care. Our goal is always to provide you with excellent care. Hearing back from our patients is one way we can continue to improve our services. Please take a few minutes to complete the written survey that you may receive in the mail after your visit with us. Thank you!             Your Updated Medication List - Protect others around you: Learn how to safely use, store and throw away your medicines at www.disposemymeds.org.          This list is accurate as of 9/19/18  6:09 PM.  Always use your most recent med list.                   Brand Name Dispense Instructions for use Diagnosis    GABAPENTIN PO      Take 300 mg by mouth 3 times daily        ibuprofen 800 MG tablet    ADVIL/MOTRIN    100 tablet    Take 1 tablet (800 mg) by mouth every 8 hours as needed for moderate pain        ketorolac 10 MG tablet    TORADOL    20 tablet    Take 1 tablet (10 mg) by mouth every 6 hours as needed        levonorgestrel 20 MCG/24HR IUD    MIRENA     1 each (20 mcg) by Intrauterine route continuous    Menorrhagia with regular cycle       NAPROXEN PO      Take by mouth 2 times daily as needed        TYLENOL PO      Take 1,000 mg by mouth every 6 hours as needed for mild pain or fever        XANAX PO      Take 1 mg by mouth 2 times daily

## 2018-09-19 NOTE — PROGRESS NOTES
SUBJECTIVE:   Milla Bose is a 38 year old female who presents to clinic today for the following health issues:      ED/UC Followup:    [place]  [facility] [place]  Date of visit: 07/10/18  Reason for visit: Cervicalgia  Current Status: Worse.  Patient did have CT scan with results as follows:    IMPRESSION:  No evidence of traumatic cervical spine injury.  Multilevel degenerative disease, most severe at C5-6.         Mass       Duration: 1 month    Description (location/character/radiation): hard mass on left hand    Intensity:  mild    Accompanying signs and symptoms: see above    History (similar episodes/previous evaluation): None    Precipitating or alleviating factors: None    Therapies tried and outcome: None       Problem list and histories reviewed & adjusted, as indicated.  Additional history: as documented    Patient Active Problem List   Diagnosis     Schizoaffective disorder (H)     PTSD (post-traumatic stress disorder)     Anxiety state     Panic disorder with agoraphobia     Astigmatism     Major depression     Myopia     Personality, multiple     Congenital cataract     Past Surgical History:   Procedure Laterality Date     cyst removal left breast       cyst removed from right ear       GYN SURGERY      laser of cervix       Social History   Substance Use Topics     Smoking status: Current Every Day Smoker     Packs/day: 0.50     Years: 20.00     Types: Cigarettes     Smokeless tobacco: Never Used     Alcohol use Yes      Comment: rare     Family History   Problem Relation Age of Onset     HEART DISEASE Mother      Alcohol/Drug Mother      Unknown/Adopted Father      Cancer Paternal Grandmother      Cervical         Current Outpatient Prescriptions   Medication Sig Dispense Refill     ibuprofen (ADVIL/MOTRIN) 800 MG tablet Take 1 tablet (800 mg) by mouth every 8 hours as needed for moderate pain 100 tablet 0     Acetaminophen (TYLENOL PO) Take 1,000 mg by mouth every 6 hours as needed for mild pain or  fever       ALPRAZolam (XANAX PO) Take 1 mg by mouth 2 times daily       GABAPENTIN PO Take 300 mg by mouth 3 times daily       ketorolac (TORADOL) 10 MG tablet Take 1 tablet (10 mg) by mouth every 6 hours as needed 20 tablet 0     levonorgestrel (MIRENA) 20 MCG/24HR IUD 1 each (20 mcg) by Intrauterine route continuous       NAPROXEN PO Take by mouth 2 times daily as needed        Allergies   Allergen Reactions     Codeine GI Disturbance       Reviewed and updated as needed this visit by clinical staff       Reviewed and updated as needed this visit by Provider         ROS:  Constitutional, HEENT, cardiovascular, pulmonary, gi and gu systems are negative, except as otherwise noted.    OBJECTIVE:     /82 (BP Location: Right arm, Patient Position: Sitting, Cuff Size: Adult Regular)  Pulse 108  Temp 96.9  F (36.1  C) (Tympanic)  Resp 16  Wt 130 lb (59 kg)  SpO2 98%  BMI 24.56 kg/m2  Body mass index is 24.56 kg/(m^2).  GENERAL: healthy, alert and no distress  MS: Hand: ganglion cyst of the left hand  Neck: patient hold head stiffly without much movement  PSYCH: mentation appears normal, affect normal/bright    Diagnostic Test Results:  none     ASSESSMENT/PLAN:     1. Cervicalgia  Referral made for injections for pain.  Ibuprofen renewed.  Follow-up as directed.  - IR Consultation for IR Exam; Future    2. Ganglion cyst  Will refer to Orthopedics for evaluation.  - ORTHOPEDICS ADULT REFERRAL      Bonny Valdes MD  [facility]

## 2018-09-20 ASSESSMENT — PATIENT HEALTH QUESTIONNAIRE - PHQ9: SUM OF ALL RESPONSES TO PHQ QUESTIONS 1-9: 10

## 2018-09-20 ASSESSMENT — ANXIETY QUESTIONNAIRES: GAD7 TOTAL SCORE: 20

## 2018-09-26 ENCOUNTER — HOSPITAL ENCOUNTER (EMERGENCY)
Facility: HOSPITAL | Age: 38
Discharge: HOME | End: 2018-09-27
Attending: INTERNAL MEDICINE | Admitting: INTERNAL MEDICINE
Payer: MEDICARE

## 2018-09-26 DIAGNOSIS — R10.32 LEFT GROIN PAIN: ICD-10-CM

## 2018-09-26 DIAGNOSIS — N89.8 VAGINAL DISCHARGE: ICD-10-CM

## 2018-09-26 PROCEDURE — 96372 THER/PROPH/DIAG INJ SC/IM: CPT

## 2018-09-26 PROCEDURE — 99283 EMERGENCY DEPT VISIT LOW MDM: CPT | Mod: Z6 | Performed by: INTERNAL MEDICINE

## 2018-09-26 PROCEDURE — 99284 EMERGENCY DEPT VISIT MOD MDM: CPT | Mod: 25

## 2018-09-26 NOTE — ED AVS SNAPSHOT
[facility]    [address] [place] [address]    Phone:  [phone]                                       Milla Bose   MRN: [id number]    Department:  [facility]   Date of Visit:  9/26/2018           Patient Information     Date Of Birth          1980        Your diagnoses for this visit were:     Vaginal discharge     Left groin pain        You were seen by Randall Forman MD.      Follow-up Information     Follow up with Bonny Valdes MD. Schedule an appointment as soon as possible for a visit in 2 days.    Specialty:  Family Practice    Contact information:    [address] [facility] [address]  [phone]          Discharge Instructions         Pelvic Pain, Uncertain Cause    Pelvic pain is pain felt in the lowest part of the belly (abdomen) and between the hipbones. The pain may occur suddenly and recently (acute). Or the pain may last for 6 months or longer (chronic).  There are many possible causes of pelvic pain. The pain may be due to a problem in the female reproductive system. Or, it may be due to a problem in the digestive, urinary, or musculoskeletal systems.  Based on your visit today, the exact cause of your pelvic pain is not certain. Your condition does not appear to be serious at this time. But it is important for you to keep watching for any new symptoms or worsening of your condition.  General care  Your healthcare provider may advise a number of ways to help manage your pain. These can include:    Taking over-the-counter pain medicine. Stronger pain medicine may also be prescribed, if needed.    Applying heat to the pelvic area. Use a heating pad or a hot pack. Taking a hot bath may also help.    Getting plenty of rest.    Making certain lifestyle changes. These can include practicing good posture and getting regular exercise. Studies have shown that these changes help reduce pelvic pain in some women.    Seeing a physical  therapist or pain specialist. These healthcare providers can discuss other ways to manage pain with you.  Follow-up care  Follow up with your healthcare provider, or as advised.   When to seek medical advice  Call your healthcare provider right away if any of the following occur:    Fever of 100.4 F or higher, or as directed by your healthcare provider    Pain worsens or you have sudden, severe pain or new pain    Nausea, vomiting, sweating, or restlessness    Dizziness or fainting    Unusual vaginal discharge    Abnormal vaginal bleeding (especially bleeding after menopause)  Date Last Reviewed: 10/1/2017    [id number] [employer]. [address] [place] [address]. All rights reserved. This information is not intended as a substitute for professional medical care. Always follow your healthcare professional's instructions.          Your next 10 appointments already scheduled     Oct 17, 2018  9:30 AM CDT   (Arrive by 9:15 AM)   IR CONSULTATION FOR IR EXAM with [facility] ([facility] )    [address] [place] [id number] [phone]                 Review of your medicines      Our records show that you are taking the medicines listed below. If these are incorrect, please call your family doctor or clinic.        Dose / Directions Last dose taken    GABAPENTIN PO   Dose:  300 mg        Take 300 mg by mouth 3 times daily   Refills:  0        ibuprofen 800 MG tablet   Commonly known as:  ADVIL/MOTRIN   Dose:  800 mg   Quantity:  100 tablet        Take 1 tablet (800 mg) by mouth every 8 hours as needed for moderate pain   Refills:  0        ketorolac 10 MG tablet   Commonly known as:  TORADOL   Dose:  10 mg   Quantity:  20 tablet        Take 1 tablet (10 mg) by mouth every 6 hours as needed   Refills:  0        levonorgestrel 20 MCG/24HR IUD   Commonly known as:  MIRENA   Dose:  1 each        1 each (20 mcg) by Intrauterine route continuous    Refills:  0        NAPROXEN PO        Take by mouth 2 times daily as needed   Refills:  0        TYLENOL PO   Dose:  1000 mg        Take 1,000 mg by mouth every 6 hours as needed for mild pain or fever   Refills:  0        XANAX PO   Dose:  1 mg        Take 1 mg by mouth 2 times daily   Refills:  0                Procedures and tests performed during your visit     GC/Chlamydia by PCR - [facility]    HCG qualitative urine    UA with Microscopic reflex to Culture    Wet prep      Orders Needing Specimen Collection     None      Pending Results     Date and Time Order Name Status Description    9/27/2018 0211 GC/Chlamydia by PCR - [facility] In process             Pending Culture Results     Date and Time Order Name Status Description    9/27/2018 0211 GC/Chlamydia by PCR - [facility] In process             Thank you for choosing [facility]       Thank you for choosing [facility] for your care. Our goal is always to provide you with excellent care. Hearing back from our patients is one way we can continue to improve our services. Please take a few minutes to complete the written survey that you may receive in the mail after you visit with us. Thank you!        [employer] Information     [employer] gives you secure access to your electronic health record. If you see a primary care provider, you can also send messages to your care team and make appointments. If you have questions, please call your primary care clinic.  If you do not have a primary care provider, please call [phone] and they will assist you.        Care EveryWhere ID     This is your Care EveryWhere ID. This could be used by other organizations to access your [facility] medical records  [id number]        Equal Access to Services     AMANDA ROONEY : Ai Russell, wajamesda milana, qaybkhurram kaalmada amado, mackenzie fam. So [facility] [phone].    ATENCIÓN: Si habla español, tiene a morgan disposición servicios gratuitos de asistencia  robert Tabareskarlie al [phone].    We comply with applicable federal civil rights laws and [place] laws. We do not discriminate on the basis of race, color, national origin, age, disability, sex, sexual orientation, or gender identity.            After Visit Summary       This is your record. Keep this with you and show to your community pharmacist(s) and doctor(s) at your next visit.

## 2018-09-26 NOTE — ED AVS SNAPSHOT
[facility]    [address] [place] [address]    Phone:  [phone]                                       Milla Bose   MRN: [id number]    Department:  [facility]   Date of Visit:  9/26/2018           After Visit Summary Signature Page     I have received my discharge instructions, and my questions have been answered. I have discussed any challenges I see with this plan with the nurse or doctor.    ..........................................................................................................................................  Patient/Patient Representative Signature      ..........................................................................................................................................  Patient Representative Print Name and Relationship to Patient    ..................................................               ................................................  Date                                   Time    ..........................................................................................................................................  Reviewed by Signature/Title    ...................................................              ..............................................  Date                                               Time          22EPIC Rev 08/18

## 2018-09-27 VITALS
OXYGEN SATURATION: 98 % | HEART RATE: 96 BPM | SYSTOLIC BLOOD PRESSURE: 112 MMHG | DIASTOLIC BLOOD PRESSURE: 68 MMHG | TEMPERATURE: 98 F | RESPIRATION RATE: 18 BRPM

## 2018-09-27 LAB
ALBUMIN UR-MCNC: NEGATIVE MG/DL
APPEARANCE UR: CLEAR
BACTERIA #/AREA URNS HPF: ABNORMAL /HPF
BILIRUB UR QL STRIP: NEGATIVE
C TRACH DNA SPEC QL PROBE+SIG AMP: NOT DETECTED
COLOR UR AUTO: ABNORMAL
GLUCOSE UR STRIP-MCNC: NEGATIVE MG/DL
HCG UR QL: NEGATIVE
HGB UR QL STRIP: NEGATIVE
KETONES UR STRIP-MCNC: NEGATIVE MG/DL
LEUKOCYTE ESTERASE UR QL STRIP: NEGATIVE
N GONORRHOEA DNA SPEC QL PROBE+SIG AMP: ABNORMAL
NITRATE UR QL: NEGATIVE
PH UR STRIP: 6.5 PH (ref 4.7–8)
RBC #/AREA URNS AUTO: 0 /HPF (ref 0–2)
SOURCE: ABNORMAL
SP GR UR STRIP: 1 (ref 1–1.03)
SPECIMEN SOURCE: ABNORMAL
SPECIMEN SOURCE: NORMAL
UROBILINOGEN UR STRIP-MCNC: NORMAL MG/DL (ref 0–2)
WBC #/AREA URNS AUTO: <1 /HPF (ref 0–5)
WET PREP SPEC: NORMAL

## 2018-09-27 PROCEDURE — 25000132 ZZH RX MED GY IP 250 OP 250 PS 637: Mod: GY | Performed by: INTERNAL MEDICINE

## 2018-09-27 PROCEDURE — A9270 NON-COVERED ITEM OR SERVICE: HCPCS | Mod: GY | Performed by: INTERNAL MEDICINE

## 2018-09-27 PROCEDURE — 87491 CHLMYD TRACH DNA AMP PROBE: CPT | Performed by: INTERNAL MEDICINE

## 2018-09-27 PROCEDURE — 87210 SMEAR WET MOUNT SALINE/INK: CPT | Performed by: INTERNAL MEDICINE

## 2018-09-27 PROCEDURE — 81025 URINE PREGNANCY TEST: CPT | Performed by: INTERNAL MEDICINE

## 2018-09-27 PROCEDURE — 87591 N.GONORRHOEAE DNA AMP PROB: CPT | Performed by: INTERNAL MEDICINE

## 2018-09-27 PROCEDURE — 81001 URINALYSIS AUTO W/SCOPE: CPT | Performed by: INTERNAL MEDICINE

## 2018-09-27 PROCEDURE — 25000128 H RX IP 250 OP 636: Performed by: INTERNAL MEDICINE

## 2018-09-27 RX ORDER — LIDOCAINE HYDROCHLORIDE 10 MG/ML
INJECTION, SOLUTION EPIDURAL; INFILTRATION; INTRACAUDAL; PERINEURAL
Status: DISCONTINUED
Start: 2018-09-27 | End: 2018-09-27 | Stop reason: HOSPADM

## 2018-09-27 RX ORDER — NAPROXEN 500 MG/1
500 TABLET ORAL ONCE
Status: COMPLETED | OUTPATIENT
Start: 2018-09-27 | End: 2018-09-27

## 2018-09-27 RX ORDER — CEFTRIAXONE SODIUM 1 G
250 VIAL (EA) INJECTION ONCE
Status: COMPLETED | OUTPATIENT
Start: 2018-09-27 | End: 2018-09-27

## 2018-09-27 RX ORDER — AZITHROMYCIN 250 MG/1
1000 TABLET, FILM COATED ORAL ONCE
Status: COMPLETED | OUTPATIENT
Start: 2018-09-27 | End: 2018-09-27

## 2018-09-27 RX ADMIN — CEFTRIAXONE 250 MG: 1 INJECTION, POWDER, FOR SOLUTION INTRAMUSCULAR; INTRAVENOUS at 02:38

## 2018-09-27 RX ADMIN — NAPROXEN 500 MG: 500 TABLET ORAL at 00:11

## 2018-09-27 RX ADMIN — AZITHROMYCIN 1000 MG: 250 TABLET, FILM COATED ORAL at 02:38

## 2018-09-27 NOTE — ED NOTES
Patient is resting with eyes closed, respirations unlabored. Patient does awaken to verbal. States she does feel better and has been able to get some sleep.  Medications given as ordered.

## 2018-09-27 NOTE — ED NOTES
Instructed patient that we need a urine sample and she states she doesn't feel like she needs to go at this time. Call light is within reach.

## 2018-09-27 NOTE — ED NOTES
Patient states that she developed left groin pain earlier today and states it started after having intercourse. States she also has had some vaginal discharge for 2 days and that she noticed today she had some blood in the discharge.  States she has not been using a pad or anything, but noticed blood on toilet paper. Then she states she also starting have episodes of dizziness when she gets up from a sitting position and that started today.

## 2018-09-27 NOTE — DISCHARGE INSTRUCTIONS
Pelvic Pain, Uncertain Cause    Pelvic pain is pain felt in the lowest part of the belly (abdomen) and between the hipbones. The pain may occur suddenly and recently (acute). Or the pain may last for 6 months or longer (chronic).  There are many possible causes of pelvic pain. The pain may be due to a problem in the female reproductive system. Or, it may be due to a problem in the digestive, urinary, or musculoskeletal systems.  Based on your visit today, the exact cause of your pelvic pain is not certain. Your condition does not appear to be serious at this time. But it is important for you to keep watching for any new symptoms or worsening of your condition.  General care  Your healthcare provider may advise a number of ways to help manage your pain. These can include:    Taking over-the-counter pain medicine. Stronger pain medicine may also be prescribed, if needed.    Applying heat to the pelvic area. Use a heating pad or a hot pack. Taking a hot bath may also help.    Getting plenty of rest.    Making certain lifestyle changes. These can include practicing good posture and getting regular exercise. Studies have shown that these changes help reduce pelvic pain in some women.    Seeing a physical therapist or pain specialist. These healthcare providers can discuss other ways to manage pain with you.  Follow-up care  Follow up with your healthcare provider, or as advised.   When to seek medical advice  Call your healthcare provider right away if any of the following occur:    Fever of 100.4 F or higher, or as directed by your healthcare provider    Pain worsens or you have sudden, severe pain or new pain    Nausea, vomiting, sweating, or restlessness    Dizziness or fainting    Unusual vaginal discharge    Abnormal vaginal bleeding (especially bleeding after menopause)  Date Last Reviewed: 10/1/2017    [id number] [employer]. [address] [place] [address], [place] [address]. All rights reserved. This  information is not intended as a substitute for professional medical care. Always follow your healthcare professional's instructions.

## 2018-09-27 NOTE — ED NOTES
I enter room and patient is again sleeping on the bed, awakens to verbal.  She states she is tired and wants to sleep.  I told her that she has been discharged and she isn't able to sleep in the room in case it is needed.  She then tells me in a raised voice that she is not seeking drugs and she is wondering why we can't give her something else.  I then mentioned to her that she has been sleeping for about 2 hours here and she states yes because I am tired and it is nighttime. I then say to her that I said nothing about her seeking drugs. She then states that she can go home and take stronger drugs if she wants them. After she finishes, I leave the room and close the door.  She does finally get dressed and exits the room at 0337.

## 2018-09-27 NOTE — ED NOTES
Discharge instructions gone over with patient and she states understanding.  Patient is then discharged in stable condition, ambulatory.

## 2018-09-27 NOTE — ED NOTES
Significant value: N. Gonorrhea positive . Reported results to Dr. Forman at 0583. Pt was treated.

## 2018-09-27 NOTE — ED NOTES
Went back into patient room to see if she had left yet and she is sleeping on the bed.  Awakens again to verbal and I instructed her that she has been discharged and she states she is tired and still has some pain. I told her that she would be okay to take either Ibuprofen or Tylenol when she got home and she states understanding. Told her I would step out of the room and let her get dressed and then she is able to leave.  She states understanding.

## 2018-09-27 NOTE — ED NOTES
Patient resting with eyes closed. Awakens to verbal.  Instructed patient that she is being discharged and she states understanding. She states she is tired.

## 2018-09-28 ASSESSMENT — ENCOUNTER SYMPTOMS
MYALGIAS: 0
LIGHT-HEADEDNESS: 0
VOMITING: 0
CHILLS: 0
ARTHRALGIAS: 0
HEMATURIA: 0
NECK STIFFNESS: 0
CONFUSION: 0
HEADACHES: 0
ABDOMINAL PAIN: 0
BACK PAIN: 0
COUGH: 0
ABDOMINAL DISTENTION: 0
WHEEZING: 0
WOUND: 0
FLANK PAIN: 0
PALPITATIONS: 0
CHEST TIGHTNESS: 0
ANAL BLEEDING: 0
SHORTNESS OF BREATH: 0
FEVER: 0
NUMBNESS: 0
VOICE CHANGE: 0
NECK PAIN: 0
NAUSEA: 0
DIAPHORESIS: 0
COLOR CHANGE: 0
DIZZINESS: 0
DYSURIA: 0
BLOOD IN STOOL: 0

## 2018-09-28 NOTE — ED PROVIDER NOTES
History     Chief Complaint   Patient presents with     Groin Pain     Vaginal Discharge     The history is provided by the patient.     Milla Bose is a 38 year old female who came for Left groin pain and vaginal discharge since last week, had unprotected sex about 7 days ago.     Problem List:    Patient Active Problem List    Diagnosis Date Noted     Schizoaffective disorder (H) 07/03/2012     Priority: Medium     PTSD (post-traumatic stress disorder) 07/03/2012     Priority: Medium     Anxiety state 07/03/2012     Priority: Medium     Panic disorder with agoraphobia 07/03/2012     Priority: Medium     Major depression 08/03/2011     Priority: Medium     Overview:   IMO Update 10/11       Personality, multiple 08/03/2011     Priority: Medium     Overview:   IMO Update 10/11       Astigmatism 08/31/2009     Priority: Medium     Overview:   IMO Update       Myopia 08/31/2009     Priority: Medium     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     Priority: Medium     Overview:   IMO Update 10/11          Past Medical History:    Past Medical History:   Diagnosis Date     Anxiety 07/03/2012     Asthma,unspecified type, unspecified 07/03/2012     Panic disorder with agoraphobia 07/03/2012     PTSD (post-traumatic stress disorder) 07/03/2012     Schizoaffective disorder 07/03/2012       Past Surgical History:    Past Surgical History:   Procedure Laterality Date     cyst removal left breast       cyst removed from right ear       GYN SURGERY      laser of cervix       Family History:    Family History   Problem Relation Age of Onset     HEART DISEASE Mother      Alcohol/Drug Mother      Unknown/Adopted Father      Cancer Paternal Grandmother      Cervical       Social History:  Marital Status:  Single [1]  Social History   Substance Use Topics     Smoking status: Current Every Day Smoker     Packs/day: 0.50     Years: 20.00     Types: Cigarettes     Smokeless tobacco: Never Used     Alcohol use Yes      Comment: rare         Medications:      ALPRAZolam (XANAX PO)   GABAPENTIN PO   ibuprofen (ADVIL/MOTRIN) 800 MG tablet   ketorolac (TORADOL) 10 MG tablet   Acetaminophen (TYLENOL PO)   levonorgestrel (MIRENA) 20 MCG/24HR IUD   NAPROXEN PO         Review of Systems   Constitutional: Negative for chills, diaphoresis and fever.   HENT: Negative for voice change.    Eyes: Negative for visual disturbance.   Respiratory: Negative for cough, chest tightness, shortness of breath and wheezing.    Cardiovascular: Negative for chest pain, palpitations and leg swelling.   Gastrointestinal: Negative for abdominal distention, abdominal pain, anal bleeding, blood in stool, nausea and vomiting.   Genitourinary: Positive for pelvic pain, vaginal discharge and vaginal pain. Negative for decreased urine volume, dysuria, flank pain, hematuria and vaginal bleeding.   Musculoskeletal: Negative for arthralgias, back pain, gait problem, myalgias, neck pain and neck stiffness.   Skin: Negative for color change, pallor, rash and wound.   Neurological: Negative for dizziness, syncope, light-headedness, numbness and headaches.   Psychiatric/Behavioral: Negative for confusion and suicidal ideas.       Physical Exam   BP: 113/72  Pulse: 120  Temp: 97.8  F (36.6  C)  Resp: 18  SpO2: 100 %      Physical Exam   Constitutional: She is oriented to person, place, and time. She appears well-developed and well-nourished.   HENT:   Head: Normocephalic and atraumatic.   Mouth/Throat: No oropharyngeal exudate.   Eyes: Conjunctivae are normal. Pupils are equal, round, and reactive to light.   Neck: Normal range of motion. Neck supple. No JVD present. No tracheal deviation present. No thyromegaly present.   Cardiovascular: Normal rate, regular rhythm, normal heart sounds and intact distal pulses.  Exam reveals no gallop and no friction rub.    No murmur heard.  Pulmonary/Chest: Effort normal and breath sounds normal. No stridor. No respiratory distress. She has no wheezes.  She has no rales. She exhibits no tenderness.   Abdominal: Soft. Bowel sounds are normal. She exhibits no distension and no mass. There is no tenderness. There is no rebound and no guarding.   Genitourinary: Uterus normal. There is no rash, tenderness or lesion on the right labia. There is no rash, tenderness, lesion or injury on the left labia. Cervix exhibits motion tenderness and discharge. Cervix exhibits no friability. Right adnexum displays no mass, no tenderness and no fullness. Left adnexum displays no mass, no tenderness and no fullness. No erythema, tenderness or bleeding in the vagina. No foreign body in the vagina. No signs of injury around the vagina. Vaginal discharge found.   Musculoskeletal: Normal range of motion. She exhibits no edema or tenderness.   Lymphadenopathy:     She has no cervical adenopathy.   Neurological: She is alert and oriented to person, place, and time.   Skin: Skin is warm and dry. No rash noted. No erythema. No pallor.   Psychiatric: Her behavior is normal.   Nursing note and vitals reviewed.      ED Course     ED Course     Procedures                 Results for orders placed or performed during the hospital encounter of 09/26/18 (from the past 24 hour(s))   Wet prep   Result Value Ref Range    Specimen Description Vagina     Wet Prep       No Trichomonas seen  No clue cells seen  No yeast seen  Moderate  WBC'S seen     GC/Chlamydia by PCR - [facility]   Result Value Ref Range    Specimen Source Vagina     Neisseria gonorrhoreae PCR Detected, Abnormal Result (AA) NDET^Not Detected    Chlamydia Trachomatis PCR Not Detected NDET^Not Detected       Medications   naproxen (NAPROSYN) tablet 500 mg (500 mg Oral Given 9/27/18 0011)   cefTRIAXone (ROCEPHIN) injection 250 mg (250 mg Intramuscular Given 9/27/18 0238)   azithromycin (ZITHROMAX) tablet 1,000 mg (1,000 mg Oral Given 9/27/18 0238)       Assessments & Plan (with Medical Decision Making)   Vaginal discharge + pelvic pain  UA  negative  Pelvic exam: mild discharge , mild CMT   Cultures were sent  Treated with IM ceftriaxone + zithro 1000 mg  I advised her to follow with her PCP for final result of pelvic tests   She understood and agreed  I have reviewed the nursing notes.    I have reviewed the findings, diagnosis, plan and need for follow up with the patient.      Discharge Medication List as of 9/27/2018  3:08 AM          Final diagnoses:   Vaginal discharge   Left groin pain       9/26/2018   [facility]     Randall Forman MD  09/28/18 0358

## 2018-10-20 DIAGNOSIS — M54.2 CERVICALGIA: Primary | ICD-10-CM

## 2018-10-22 RX ORDER — IBUPROFEN 800 MG/1
TABLET, FILM COATED ORAL
Qty: 100 TABLET | Refills: 0 | Status: SHIPPED | OUTPATIENT
Start: 2018-10-22 | End: 2018-11-25

## 2018-10-22 NOTE — TELEPHONE ENCOUNTER
ibuprofen (ADVIL/MOTRIN) 800 MG tablet      Last Written Prescription Date:  9/19/18  Last Fill Quantity: 100,   # refills: 0  Last Office Visit: 9/19/18  Future Office visit:       Routing refill request to provider for review/approval because:   Normal ALT on file in past 12 months           Recent Labs   Lab Test  09/10/15   1101   ALT  66*                  Normal AST on file in past 12 months           Recent Labs   Lab Test  09/10/15   1101   AST  31

## 2018-11-25 DIAGNOSIS — M54.2 CERVICALGIA: ICD-10-CM

## 2018-11-27 RX ORDER — IBUPROFEN 800 MG/1
TABLET, FILM COATED ORAL
Qty: 100 TABLET | Refills: 0 | Status: SHIPPED | OUTPATIENT
Start: 2018-11-27

## 2018-11-27 NOTE — TELEPHONE ENCOUNTER
No creatinine noted in 12 months. IBU last filled on 10.22.18 #100. Last office visit on 9.19.18. Pended.Thank you.

## 2019-01-19 ENCOUNTER — HOSPITAL ENCOUNTER (EMERGENCY)
Facility: HOSPITAL | Age: 39
Discharge: HOME | End: 2019-01-19
Attending: PHYSICIAN ASSISTANT | Admitting: PHYSICIAN ASSISTANT
Payer: MEDICARE

## 2019-01-19 VITALS
SYSTOLIC BLOOD PRESSURE: 122 MMHG | DIASTOLIC BLOOD PRESSURE: 86 MMHG | HEART RATE: 118 BPM | RESPIRATION RATE: 16 BRPM | OXYGEN SATURATION: 99 % | TEMPERATURE: 98.3 F

## 2019-01-19 DIAGNOSIS — N61.0 CELLULITIS OF LEFT BREAST: ICD-10-CM

## 2019-01-19 PROCEDURE — G0463 HOSPITAL OUTPT CLINIC VISIT: HCPCS

## 2019-01-19 PROCEDURE — 99213 OFFICE O/P EST LOW 20 MIN: CPT | Performed by: PHYSICIAN ASSISTANT

## 2019-01-19 RX ORDER — DICLOXACILLIN SODIUM 500 MG
500 CAPSULE ORAL 4 TIMES DAILY
Qty: 40 CAPSULE | Refills: 0 | Status: SHIPPED | OUTPATIENT
Start: 2019-01-19 | End: 2019-01-29

## 2019-01-19 RX ORDER — DICLOXACILLIN SODIUM 500 MG
500 CAPSULE ORAL 4 TIMES DAILY
Status: DISCONTINUED | OUTPATIENT
Start: 2019-01-19 | End: 2019-01-19 | Stop reason: HOSPADM

## 2019-01-19 ASSESSMENT — ENCOUNTER SYMPTOMS
WOUND: 1
CONSTITUTIONAL NEGATIVE: 1
PSYCHIATRIC NEGATIVE: 1
CARDIOVASCULAR NEGATIVE: 1
NEUROLOGICAL NEGATIVE: 1

## 2019-01-19 NOTE — ED AVS SNAPSHOT
[facility]  [address] [address]  Phone:  [phone]                                    Milla Bose   MRN: [id number]    Department:  [facility]   Date of Visit:  1/19/2019           After Visit Summary Signature Page    I have received my discharge instructions, and my questions have been answered. I have discussed any challenges I see with this plan with the nurse or doctor.    ..........................................................................................................................................  Patient/Patient Representative Signature      ..........................................................................................................................................  Patient Representative Print Name and Relationship to Patient    ..................................................               ................................................  Date                                   Time    ..........................................................................................................................................  Reviewed by Signature/Title    ...................................................              ..............................................  Date                                               Time          22EPIC Rev 08/18

## 2019-01-20 NOTE — ED TRIAGE NOTES
Pt presents today with her sons for c/o a a reddened area to her left breast that she feels is infected.

## 2019-01-20 NOTE — ED PROVIDER NOTES
History     Chief Complaint   Patient presents with     Wound Check     The history is provided by the patient. No  was used.     Milla Bose is a 38 year old female who has left breast painful mass and redness. The mass started a few mos ago. Over the last week it got bigger, painful and red over last week.     Allergies:  Allergies   Allergen Reactions     Codeine GI Disturbance       Problem List:    Patient Active Problem List    Diagnosis Date Noted     Schizoaffective disorder (H) 07/03/2012     Priority: Medium     PTSD (post-traumatic stress disorder) 07/03/2012     Priority: Medium     Anxiety state 07/03/2012     Priority: Medium     Panic disorder with agoraphobia 07/03/2012     Priority: Medium     Major depression 08/03/2011     Priority: Medium     Overview:   IMO Update 10/11       Personality, multiple (H) 08/03/2011     Priority: Medium     Overview:   IMO Update 10/11       Astigmatism 08/31/2009     Priority: Medium     Overview:   IMO Update       Myopia 08/31/2009     Priority: Medium     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     Priority: Medium     Overview:   IMO Update 10/11          Past Medical History:    Past Medical History:   Diagnosis Date     Anxiety 07/03/2012     Asthma,unspecified type, unspecified 07/03/2012     Panic disorder with agoraphobia 07/03/2012     PTSD (post-traumatic stress disorder) 07/03/2012     Schizoaffective disorder 07/03/2012       Past Surgical History:    Past Surgical History:   Procedure Laterality Date     cyst removal left breast       cyst removed from right ear       GYN SURGERY      laser of cervix       Family History:    Family History   Problem Relation Age of Onset     Heart Disease Mother      Alcohol/Drug Mother      Unknown/Adopted Father      Cancer Paternal Grandmother         Cervical       Social History:  Marital Status:  Single [1]  Social History     Tobacco Use     Smoking status: Current Every Day Smoker      Packs/day: 0.50     Years: 20.00     Pack years: 10.00     Types: Cigarettes     Smokeless tobacco: Never Used   Substance Use Topics     Alcohol use: Yes     Comment: rare     Drug use: No     Comment: marijuana        Medications:      ALPRAZolam (XANAX PO)   dicloxacillin (DYNAPEN) 500 MG capsule   GABAPENTIN PO   ibuprofen (ADVIL/MOTRIN) 800 MG tablet   levonorgestrel (MIRENA) 20 MCG/24HR IUD   Acetaminophen (TYLENOL PO)   ketorolac (TORADOL) 10 MG tablet   NAPROXEN PO         Review of Systems   Constitutional: Negative.    HENT: Negative.    Cardiovascular: Negative.    Skin: Positive for wound.   Neurological: Negative.    Psychiatric/Behavioral: Negative.        Physical Exam   BP: 122/86  Pulse: 118  Temp: 98.3  F (36.8  C)  Resp: 16  SpO2: 99 %      Physical Exam   Constitutional: She is oriented to person, place, and time. She appears well-developed and well-nourished. No distress.   Cardiovascular: Normal rate.   Left breast: area of  firmness at 08 o'clock, approx 1.5 cm x 1.5 cm, area of erythema approx 2.5 cm x 2.5 cm. Moderate TTP to this area   Pulmonary/Chest: Effort normal. Left breast exhibits mass, skin change and tenderness. Left breast exhibits no nipple discharge.   Neurological: She is alert and oriented to person, place, and time.   Skin: She is not diaphoretic.   Psychiatric: She has a normal mood and affect.   Nursing note and vitals reviewed.      ED Course        Procedures          No results found for this or any previous visit (from the past 24 hour(s)).    Medications   dicloxacillin (DYNAPEN) capsule 500 mg (500 mg Oral Not Given 1/19/19 1851)       Assessments & Plan (with Medical Decision Making)     I have reviewed the nursing notes.    I have reviewed the findings, diagnosis, plan and need for follow up with the patient.         Medication List      Started    dicloxacillin 500 MG capsule  Commonly known as:  DYNAPEN  500 mg, Oral, 4 TIMES DAILY            Final diagnoses:    Cellulitis of left breast         Patient verbally educated and given appropriate education sheets for the diagnoses and has no questions.  Take medications as directed.   Follow up with your Primary Care provider in 2 days- you need a complete bilateral breast exam and a mammogram.   if symptoms increase or if further concerns develop, return to the ER  Hui Hubbard Certified  Physician Assistant  1/19/2019  7:45 PM  URGENT CARE CLINIC      1/19/2019   [facility]     Hui Hubbard PA  01/19/19 1954

## 2019-01-23 ENCOUNTER — TELEPHONE (OUTPATIENT)
Dept: FAMILY MEDICINE | Facility: OTHER | Age: 39
End: 2019-01-23

## 2019-01-23 DIAGNOSIS — N64.4 BREAST PAIN, LEFT: Primary | ICD-10-CM

## 2019-01-23 NOTE — TELEPHONE ENCOUNTER
1:31 PM    Reason for Call: Phone Call    Description: Pt was at  at [facility] and states she has an infection & lump in her breast. [facility] provider told her to get a mammogram, but did not place an order. Pt would like order for mammo placed, but she is under 40 & doesn't know if Dr. Bernstein needs to place a special order for insurance purposes.    Was an appointment offered for this call? No  If yes : Appointment type              Date    Preferred method for responding to this message: Telephone Call  What is your phone number ? [phone]     If we cannot reach you directly, may we leave a detailed response at the number you provided? Yes    Can this message wait until your PCP/provider returns, if unavailable today? No, any provider replying is ok per pt    Diamond Schuster

## 2019-01-24 NOTE — TELEPHONE ENCOUNTER
We should let the infection clear first.  Order placed, but they should schedule about a week out or so to let the infection calm down.

## 2019-03-04 ENCOUNTER — HOSPITAL ENCOUNTER (OUTPATIENT)
Dept: ULTRASOUND IMAGING | Facility: HOSPITAL | Age: 39
Discharge: HOME | End: 2019-03-04
Attending: FAMILY MEDICINE | Admitting: FAMILY MEDICINE
Payer: MEDICARE

## 2019-03-04 ENCOUNTER — ANCILLARY PROCEDURE (OUTPATIENT)
Dept: MAMMOGRAPHY | Facility: OTHER | Age: 39
End: 2019-03-04
Attending: FAMILY MEDICINE
Payer: MEDICARE

## 2019-03-04 DIAGNOSIS — N63.24 BREAST LUMP ON LEFT SIDE AT 8 O'CLOCK POSITION: ICD-10-CM

## 2019-03-04 PROCEDURE — 77066 DX MAMMO INCL CAD BI: CPT | Mod: TC

## 2019-03-04 PROCEDURE — G0279 TOMOSYNTHESIS, MAMMO: HCPCS | Mod: TC

## 2019-03-04 PROCEDURE — 76642 ULTRASOUND BREAST LIMITED: CPT | Mod: TC,50

## 2019-03-04 NOTE — LETTER
Milla Bose  [address]  [place] [address]    March 4, 2019  Date of Exam: 3/4/19    Dear Milla:    Thank you for your recent visit.  Breast Imaging Result: We are pleased to inform you that the results of your recent breast imaging show no evidence of malignancy (cancer).    Breast Density: Your mammogram shows that you have dense breast tissue. This means you have a slightly higher risk of getting breast cancer. It also means your mammograms will be harder to read but it doesn't mean that mammograms aren t useful. In fact, yearly mammograms are even more important for women at higher risk.    If you are experiencing any breast problems such as a lump or localized pain we request that you discuss this with your health care provider if you haven t already done so, as additional testing may be necessary.    As you know, early detection of cancer is very important. Although mammography is the most accurate method for early detection, not all cancers are found through mammography. A thorough examination includes a combination of mammography, physical examination and breast self-examination. Currently the American College of Radiology and the Society of Breast Imaging recommend an annual mammogram for all women beginning at the age of 40.    A report of your breast imaging results was sent to: [facility]    Your breast imaging will become part of your medical file here at [facility] for at least 10 years. You are responsible for informing any new health care provider or breast imaging facility of the date and location of this examination.    We appreciate the opportunity to participate in your health care.    Sincerely,    Rony Larry MD  Interpreting Radiologist  [facility]

## 2019-08-07 ENCOUNTER — OFFICE VISIT (OUTPATIENT)
Dept: FAMILY MEDICINE | Facility: OTHER | Age: 39
End: 2019-08-07
Attending: NURSE PRACTITIONER
Payer: MEDICARE

## 2019-08-07 VITALS
RESPIRATION RATE: 18 BRPM | SYSTOLIC BLOOD PRESSURE: 122 MMHG | OXYGEN SATURATION: 99 % | DIASTOLIC BLOOD PRESSURE: 66 MMHG | TEMPERATURE: 97.7 F | HEART RATE: 98 BPM | WEIGHT: 129 LBS | BODY MASS INDEX: 24.37 KG/M2

## 2019-08-07 DIAGNOSIS — K08.89 PAIN, DENTAL: Primary | ICD-10-CM

## 2019-08-07 PROCEDURE — G0463 HOSPITAL OUTPT CLINIC VISIT: HCPCS

## 2019-08-07 PROCEDURE — 99213 OFFICE O/P EST LOW 20 MIN: CPT | Performed by: NURSE PRACTITIONER

## 2019-08-07 RX ORDER — DEXTROAMPHETAMINE SACCHARATE, AMPHETAMINE ASPARTATE MONOHYDRATE, DEXTROAMPHETAMINE SULFATE AND AMPHETAMINE SULFATE 5; 5; 5; 5 MG/1; MG/1; MG/1; MG/1
20 CAPSULE, EXTENDED RELEASE ORAL DAILY
COMMUNITY
End: 2020-06-21

## 2019-08-07 RX ORDER — TRAMADOL HYDROCHLORIDE 50 MG/1
50 TABLET ORAL EVERY 6 HOURS PRN
Qty: 30 TABLET | Refills: 0 | Status: SHIPPED | OUTPATIENT
Start: 2019-08-07 | End: 2019-08-10

## 2019-08-07 RX ORDER — DEXTROAMPHETAMINE SACCHARATE, AMPHETAMINE ASPARTATE, DEXTROAMPHETAMINE SULFATE AND AMPHETAMINE SULFATE 5; 5; 5; 5 MG/1; MG/1; MG/1; MG/1
20 TABLET ORAL 2 TIMES DAILY
COMMUNITY

## 2019-08-07 ASSESSMENT — PAIN SCALES - GENERAL: PAINLEVEL: EXTREME PAIN (8)

## 2019-08-07 NOTE — PATIENT INSTRUCTIONS
Assessment & Plan     1. Pain, dental  - traMADol (ULTRAM) 50 MG tablet; Take 1 tablet (50 mg) by mouth every 6 hours as needed for severe pain  Dispense: 30 tablet; Refill: 0  - Ibuprofen PRN  - Warm salt water rinses through the day  - Keep your dental surgery appointment as scheduled      To ER or UC as needed  See PCP for Follow-up and also you are due for a physical      Tobacco Cessation:   reports that she has been smoking cigarettes.  She has a 10.00 pack-year smoking history. She has never used smokeless tobacco.          Heather Bustamante NP  [facility]

## 2019-08-07 NOTE — NURSING NOTE
"Chief Complaint   Patient presents with     Dental Pain       Initial /66 (BP Location: Left arm, Patient Position: Sitting, Cuff Size: Adult Regular)   Pulse 98   Temp 97.7  F (36.5  C) (Tympanic)   Resp 18   Wt 58.5 kg (129 lb)   SpO2 99%   BMI 24.37 kg/m   Estimated body mass index is 24.37 kg/m  as calculated from the following:    Height as of 3/15/18: 1.549 m (5' 1\").    Weight as of this encounter: 58.5 kg (129 lb).  Medication Reconciliation: complete   Rama Ely LPN    "

## 2019-10-11 ENCOUNTER — HOSPITAL ENCOUNTER (EMERGENCY)
Facility: HOSPITAL | Age: 39
Discharge: HOME | End: 2019-10-11
Attending: NURSE PRACTITIONER | Admitting: NURSE PRACTITIONER
Payer: MEDICARE

## 2019-10-11 VITALS
SYSTOLIC BLOOD PRESSURE: 122 MMHG | TEMPERATURE: 97.3 F | HEIGHT: 61 IN | BODY MASS INDEX: 24.55 KG/M2 | HEART RATE: 100 BPM | RESPIRATION RATE: 16 BRPM | OXYGEN SATURATION: 100 % | DIASTOLIC BLOOD PRESSURE: 79 MMHG | WEIGHT: 130 LBS

## 2019-10-11 DIAGNOSIS — K04.7 DENTAL INFECTION: Primary | ICD-10-CM

## 2019-10-11 PROCEDURE — G0463 HOSPITAL OUTPT CLINIC VISIT: HCPCS

## 2019-10-11 PROCEDURE — 99213 OFFICE O/P EST LOW 20 MIN: CPT | Mod: Z6 | Performed by: NURSE PRACTITIONER

## 2019-10-11 ASSESSMENT — ENCOUNTER SYMPTOMS
SHORTNESS OF BREATH: 0
VOMITING: 0
TROUBLE SWALLOWING: 0
FACIAL SWELLING: 1
FEVER: 0
NAUSEA: 0
CHILLS: 0

## 2019-10-11 ASSESSMENT — MIFFLIN-ST. JEOR: SCORE: 1202.06

## 2019-10-11 NOTE — ED TRIAGE NOTES
jpresents with 4 day hx of increasing dental pain on the left upper and lower, greatest on upper jaw. Has an appt for October 14th.

## 2019-10-11 NOTE — ED TRIAGE NOTES
Pt is here today with c/o 4 infx teeth on upper and lower states the pqain has been the wost in the alst 4 days. Has chasity to get them pulled on oct 14th. Has taken ibu 800mg at 730 am today. Also notes she has a headache.

## 2019-10-11 NOTE — ED PROVIDER NOTES
History     Chief Complaint   Patient presents with     Dental Pain     4 day hx of dental pain on left top and bottom, greatest on the top.     The history is provided by the patient.     Milla Bose is a 39 year old female who presents with her children to  for dental pain. She reports left upper and lower dental pain with some facial swelling x4 days.  She says back upper left teeth hurt the worst.  Denies any fever, chills, nausea, vomiting, shortness of breath.  She states she was supposed to get teeth pulled few years ago but did not have the finances at the time.  She does have a dental appointment on 10/14/2019.  She has been taking 800 mg ibuprofen.    Allergies:  Allergies   Allergen Reactions     Codeine GI Disturbance       Problem List:    Patient Active Problem List    Diagnosis Date Noted     Schizoaffective disorder (H) 07/03/2012     Priority: Medium     PTSD (post-traumatic stress disorder) 07/03/2012     Priority: Medium     Anxiety state 07/03/2012     Priority: Medium     Panic disorder with agoraphobia 07/03/2012     Priority: Medium     Major depression 08/03/2011     Priority: Medium     Overview:   IMO Update 10/11       Personality, multiple (H) 08/03/2011     Priority: Medium     Overview:   IMO Update 10/11       Astigmatism 08/31/2009     Priority: Medium     Overview:   IMO Update       Myopia 08/31/2009     Priority: Medium     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     Priority: Medium     Overview:   IMO Update 10/11          Past Medical History:    Past Medical History:   Diagnosis Date     Anxiety 07/03/2012     Asthma,unspecified type, unspecified 07/03/2012     Panic disorder with agoraphobia 07/03/2012     PTSD (post-traumatic stress disorder) 07/03/2012     Schizoaffective disorder 07/03/2012       Past Surgical History:    Past Surgical History:   Procedure Laterality Date     cyst removal left breast       cyst removed from right ear       GYN SURGERY      laser of cervix  "      Family History:    Family History   Problem Relation Age of Onset     Heart Disease Mother      Alcohol/Drug Mother      Unknown/Adopted Father      Cancer Paternal Grandmother         Cervical       Social History:  Marital Status:  Single [1]  Social History     Tobacco Use     Smoking status: Current Every Day Smoker     Packs/day: 0.50     Years: 20.00     Pack years: 10.00     Types: Cigarettes     Smokeless tobacco: Never Used   Substance Use Topics     Alcohol use: Yes     Comment: rare     Drug use: No     Comment: marijuana        Medications:    amoxicillin-clavulanate (AUGMENTIN) 875-125 MG tablet  amphetamine-dextroamphetamine (ADDERALL XR) 20 MG 24 hr capsule  ibuprofen (ADVIL/MOTRIN) 800 MG tablet  Acetaminophen (TYLENOL PO)  ALPRAZolam (XANAX PO)  amphetamine-dextroamphetamine (ADDERALL) 20 MG tablet  GABAPENTIN PO  levonorgestrel (MIRENA) 20 MCG/24HR IUD          Review of Systems   Constitutional: Negative for chills and fever.   HENT: Positive for dental problem and facial swelling. Negative for trouble swallowing.    Respiratory: Negative for shortness of breath.    Cardiovascular: Negative for chest pain.   Gastrointestinal: Negative for nausea and vomiting.   All other systems reviewed and are negative.      Physical Exam   BP: 122/79  Pulse: 100  Temp: 97.3  F (36.3  C)  Resp: 16  Height: 154.9 cm (5' 1\")  Weight: 59 kg (130 lb)(stated)  SpO2: 100 %      Physical Exam  Vitals signs and nursing note reviewed.   Constitutional:       General: She is not in acute distress.     Appearance: She is not ill-appearing, toxic-appearing or diaphoretic.   HENT:      Head:      Jaw: No trismus.      Comments: Tenderness on palpation to teeth #12-15.  Broken tooth #15. Several teeth with fillings in place.     Mouth/Throat:      Lips: Pink.      Mouth: Mucous membranes are moist.      Dentition: Dental tenderness and dental caries present. No dental abscesses.      Pharynx: Oropharynx is clear. No " oropharyngeal exudate or posterior oropharyngeal erythema.   Neck:      Musculoskeletal: Normal range of motion.   Cardiovascular:      Rate and Rhythm: Normal rate.      Pulses: Normal pulses.   Pulmonary:      Effort: Pulmonary effort is normal.   Lymphadenopathy:      Cervical: No cervical adenopathy.   Neurological:      Mental Status: She is alert and oriented to person, place, and time.         ED Course        Procedures         No results found for this or any previous visit (from the past 24 hour(s)).    Medications - No data to display    Assessments & Plan (with Medical Decision Making)     I have reviewed the nursing notes.    I have reviewed the findings, diagnosis, plan and need for follow up with the patient.  Dental infection:  Patient has tenderness on palpation to tooth #12 through 15.  Slight swelling and erythema around the gumline.  Broken tooth #15.  Several fillings noted to multiple teeth.  No obvious dental abscess.  No trismus.  Patient able to swallow own secretions.  No shortness of breath.  Vital signs stable.  Discussed findings with patient.  Offered a dental block, patient declined.  Offered a Toradol shot, patient declined.  Antibiotic prescribed.  Advised patient to continue taking 800 mg of ibuprofen and alternating with Tylenol as needed for pain.  Advised her to apply ice to left side of her face.  Keep scheduled dentist appointment on 10/14/2019.  Return to emergency department for worsening or concerning symptoms.  Patient verbalized understanding and agreeable with plan.    Discharge Medication List as of 10/11/2019  2:39 PM      START taking these medications    Details   amoxicillin-clavulanate (AUGMENTIN) 875-125 MG tablet Take 1 tablet by mouth 2 times daily for 10 days, Disp-20 tablet, R-0, E-Prescribe             Final diagnoses:   Dental infection       10/11/2019   [facility]     Mpofu, Melvinnce, CNP  10/11/19 2082

## 2019-10-11 NOTE — ED AVS SNAPSHOT
[facility]  [address] [address]  Phone:  [phone]                                    Milla Bose   MRN: [id number]    Department:  [facility]   Date of Visit:  10/11/2019           After Visit Summary Signature Page    I have received my discharge instructions, and my questions have been answered. I have discussed any challenges I see with this plan with the nurse or doctor.    ..........................................................................................................................................  Patient/Patient Representative Signature      ..........................................................................................................................................  Patient Representative Print Name and Relationship to Patient    ..................................................               ................................................  Date                                   Time    ..........................................................................................................................................  Reviewed by Signature/Title    ...................................................              ..............................................  Date                                               Time          22EPIC Rev 08/18

## 2019-10-11 NOTE — DISCHARGE INSTRUCTIONS
Keep taking ibuprofen, can alternate with tylenol. Apply ice to the affected side of your face.    Keep scheduled dental appointment.    Return to emergency department for worsening or concerning symptoms.

## 2019-10-15 NOTE — ED NOTES
Entered to place social service referral to review if assistance is needed to obtain her upfront cost for oral  Surgery as pt had indicated this was a problem for her. Spoke with Allison,  and said referral could be made and they could assist her.

## 2019-10-17 ENCOUNTER — TELEPHONE (OUTPATIENT)
Dept: CASE MANAGEMENT | Facility: HOSPITAL | Age: 39
End: 2019-10-17

## 2019-10-17 NOTE — TELEPHONE ENCOUNTER
Consult received for dental assistance:financial concerns.     Medicare and Medicaid.     Dental provider is requiring upfront cost per nurse, $500.00 Generic message on voicemail listed. Will offer [employer] as appropriate.

## 2020-04-16 ENCOUNTER — TELEPHONE (OUTPATIENT)
Dept: FAMILY MEDICINE | Facility: OTHER | Age: 40
End: 2020-04-16

## 2020-04-16 NOTE — TELEPHONE ENCOUNTER
2:34 PM    Reason for Call: Phone Call    Description: Patient has a cyst on hand that is getting bigger and is wondering if she can set up an appt    Was an appointment offered for this call? No  If yes : Appointment type              Date    Preferred method for responding to this message: Telephone Call  What is your phone number ?    [phone]  If no answer, can call Seth(significant other) phone to reach her [phone]    If we cannot reach you directly, may we leave a detailed response at the number you provided? Yes    Can this message wait until your PCP/provider returns, if available today? YES    Diana Dillon

## 2020-04-16 NOTE — TELEPHONE ENCOUNTER
Spoke with patient, she states she needs to see someone about cyst on her L hand to get a referral for surgery to have it removed, has had cyst for over a year, not painful more bothersome. Explained they are only doing critical surgeries right now, suggested to wait a few weeks and see where things are at that time.  Monique Malave, SHARLENEN

## 2020-04-20 ENCOUNTER — HOSPITAL ENCOUNTER (EMERGENCY)
Facility: HOSPITAL | Age: 40
Discharge: HOME | End: 2020-04-20
Attending: NURSE PRACTITIONER | Admitting: NURSE PRACTITIONER
Payer: MEDICARE

## 2020-04-20 VITALS — OXYGEN SATURATION: 100 % | TEMPERATURE: 98.7 F | RESPIRATION RATE: 18 BRPM

## 2020-04-20 DIAGNOSIS — S91.312A LACERATION OF LEFT FOOT, INITIAL ENCOUNTER: Primary | ICD-10-CM

## 2020-04-20 PROCEDURE — G0463 HOSPITAL OUTPT CLINIC VISIT: HCPCS

## 2020-04-20 PROCEDURE — 99213 OFFICE O/P EST LOW 20 MIN: CPT | Mod: Z6 | Performed by: NURSE PRACTITIONER

## 2020-04-20 ASSESSMENT — ENCOUNTER SYMPTOMS
DYSURIA: 0
BACK PAIN: 1
APPETITE CHANGE: 0
VOMITING: 0
WOUND: 1
NAUSEA: 0
FEVER: 0

## 2020-04-20 NOTE — ED TRIAGE NOTES
Pt is here with c/o left foot lac. Pt was cut by  broken mirror. onset today.  Pt noted there are other things that is wrong with her but she will come back another day.

## 2020-04-20 NOTE — ED AVS SNAPSHOT
[facility]  [address] [address]  Phone:  [phone]                                    Milla Bose   MRN: [id number]    Department:  [facility]   Date of Visit:  4/20/2020           After Visit Summary Signature Page    I have received my discharge instructions, and my questions have been answered. I have discussed any challenges I see with this plan with the nurse or doctor.    ..........................................................................................................................................  Patient/Patient Representative Signature      ..........................................................................................................................................  Patient Representative Print Name and Relationship to Patient    ..................................................               ................................................  Date                                   Time    ..........................................................................................................................................  Reviewed by Signature/Title    ...................................................              ..............................................  Date                                               Time          22EPIC Rev 08/18

## 2020-04-20 NOTE — DISCHARGE INSTRUCTIONS
Keep the wound clean and dry.  The Steri-Strips should fall off on their own.  Avoid submerging of foot in water.  Keep dressing in place for 24 hours.  Observe for signs of infection as discussed.    Follow-up with your doctor as needed.    Return to emergency department for worsening or concerning symptoms.

## 2020-05-08 ENCOUNTER — TELEPHONE (OUTPATIENT)
Dept: FAMILY MEDICINE | Facility: OTHER | Age: 40
End: 2020-05-08

## 2020-05-08 NOTE — TELEPHONE ENCOUNTER
Patient called in and stated she has gum disease and it is infected she requested antibiotic - spoke to triage she should call dentist office.

## 2020-05-18 ENCOUNTER — OFFICE VISIT (OUTPATIENT)
Dept: FAMILY MEDICINE | Facility: OTHER | Age: 40
End: 2020-05-18
Attending: FAMILY MEDICINE
Payer: MEDICARE

## 2020-05-18 VITALS
HEART RATE: 104 BPM | HEIGHT: 61 IN | SYSTOLIC BLOOD PRESSURE: 118 MMHG | TEMPERATURE: 98 F | BODY MASS INDEX: 25.68 KG/M2 | OXYGEN SATURATION: 99 % | WEIGHT: 136 LBS | DIASTOLIC BLOOD PRESSURE: 80 MMHG

## 2020-05-18 DIAGNOSIS — F41.1 ANXIETY STATE: ICD-10-CM

## 2020-05-18 DIAGNOSIS — F33.1 MODERATE EPISODE OF RECURRENT MAJOR DEPRESSIVE DISORDER (H): Primary | ICD-10-CM

## 2020-05-18 DIAGNOSIS — F43.10 POSTTRAUMATIC STRESS DISORDER: ICD-10-CM

## 2020-05-18 DIAGNOSIS — B37.31 YEAST VAGINITIS: Primary | ICD-10-CM

## 2020-05-18 DIAGNOSIS — N89.8 VAGINAL DISCHARGE: ICD-10-CM

## 2020-05-18 DIAGNOSIS — K08.89 PAIN, DENTAL: ICD-10-CM

## 2020-05-18 DIAGNOSIS — R14.0 ABDOMINAL BLOATING: ICD-10-CM

## 2020-05-18 DIAGNOSIS — N75.0 BARTHOLIN CYST: ICD-10-CM

## 2020-05-18 DIAGNOSIS — F40.01 AGORAPHOBIA WITH PANIC DISORDER: ICD-10-CM

## 2020-05-18 DIAGNOSIS — F19.10 SUBSTANCE ABUSE (H): ICD-10-CM

## 2020-05-18 LAB
SPECIMEN SOURCE: ABNORMAL
WET PREP SPEC: ABNORMAL

## 2020-05-18 PROCEDURE — 87491 CHLMYD TRACH DNA AMP PROBE: CPT | Mod: ZL | Performed by: FAMILY MEDICINE

## 2020-05-18 PROCEDURE — G0463 HOSPITAL OUTPT CLINIC VISIT: HCPCS

## 2020-05-18 PROCEDURE — 99214 OFFICE O/P EST MOD 30 MIN: CPT | Performed by: FAMILY MEDICINE

## 2020-05-18 PROCEDURE — 87210 SMEAR WET MOUNT SALINE/INK: CPT | Mod: ZL | Performed by: FAMILY MEDICINE

## 2020-05-18 PROCEDURE — 87591 N.GONORRHOEAE DNA AMP PROB: CPT | Mod: ZL | Performed by: FAMILY MEDICINE

## 2020-05-18 RX ORDER — FLUCONAZOLE 150 MG/1
150 TABLET ORAL ONCE
Qty: 1 TABLET | Refills: 0 | Status: SHIPPED | OUTPATIENT
Start: 2020-05-18 | End: 2020-05-18

## 2020-05-18 ASSESSMENT — ANXIETY QUESTIONNAIRES
4. TROUBLE RELAXING: NEARLY EVERY DAY
5. BEING SO RESTLESS THAT IT IS HARD TO SIT STILL: NEARLY EVERY DAY
6. BECOMING EASILY ANNOYED OR IRRITABLE: MORE THAN HALF THE DAYS
7. FEELING AFRAID AS IF SOMETHING AWFUL MIGHT HAPPEN: NEARLY EVERY DAY
IF YOU CHECKED OFF ANY PROBLEMS ON THIS QUESTIONNAIRE, HOW DIFFICULT HAVE THESE PROBLEMS MADE IT FOR YOU TO DO YOUR WORK, TAKE CARE OF THINGS AT HOME, OR GET ALONG WITH OTHER PEOPLE: VERY DIFFICULT
1. FEELING NERVOUS, ANXIOUS, OR ON EDGE: NEARLY EVERY DAY
3. WORRYING TOO MUCH ABOUT DIFFERENT THINGS: MORE THAN HALF THE DAYS
GAD7 TOTAL SCORE: 18
2. NOT BEING ABLE TO STOP OR CONTROL WORRYING: MORE THAN HALF THE DAYS

## 2020-05-18 ASSESSMENT — MIFFLIN-ST. JEOR: SCORE: 1224.27

## 2020-05-18 ASSESSMENT — PATIENT HEALTH QUESTIONNAIRE - PHQ9: SUM OF ALL RESPONSES TO PHQ QUESTIONS 1-9: 24

## 2020-05-18 ASSESSMENT — PAIN SCALES - GENERAL: PAINLEVEL: WORST PAIN (10)

## 2020-05-18 NOTE — LETTER
My Depression Action Plan  Name: Milla Bose   Date of Birth 1980  Date: 5/18/2020    My doctor: Bonny Valdes   My clinic: [facility] [place]  [address] [facility] [address]  [phone]          GREEN    ZONE   Good Control    What it looks like:     Things are going generally well. You have normal ups and downs. You may even feel depressed from time to time, but bad moods usually last less than a day.   What you need to do:  1. Continue to care for yourself (see self care plan)  2. Check your depression survival kit and update it as needed  3. Follow your physician s recommendations including any medication.  4. Do not stop taking medication unless you consult with your physician first.           YELLOW         ZONE Getting Worse    What it looks like:     Depression is starting to interfere with your life.     It may be hard to get out of bed; you may be starting to isolate yourself from others.    Symptoms of depression are starting to last most all day and this has happened for several days.     You may have suicidal thoughts but they are not constant.   What you need to do:     1. Call your care team. Your response to treatment will improve if you keep your care team informed of your progress. Yellow periods are signs an adjustment may need to be made.     2. Continue your self-care.  Just get dressed and ready for the day.  Don't give yourself time to talk yourself out of it.    3. Talk to someone in your support network.    4. Open up your Depression Self-Care Plan/Wellness Kit.           RED    ZONE Medical Alert - Get Help    What it looks like:     Depression is seriously interfering with your life.     You may experience these or other symptoms: You can t get out of bed most days, can t work or engage in other necessary activities, you have trouble taking care of basic hygiene, or basic responsibilities, thoughts of suicide or death that will not go away,  self-injurious behavior.     What you need to do:  1. Call your care team and request a same-day appointment. If they are not available (weekends or after hours) call your local crisis line, emergency room or 911.            Depression Self-Care Plan / Wellness Kit    Self-Care for Depression  Here s the deal. Your body and mind are really not as separate as most people think.  What you do and think affects how you feel and how you feel influences what you do and think. This means if you do things that people who feel good do, it will help you feel better.  Sometimes this is all it takes.  There is also a place for medication and therapy depending on how severe your depression is, so be sure to consult with your medical provider and/ or Behavioral Health Consultant if your symptoms are worsening or not improving.     In order to better manage my stress, I will:    Exercise  Get some form of exercise, every day. This will help reduce pain and release endorphins, the  feel good  chemicals in your brain. This is almost as good as taking antidepressants!  This is not the same as joining a gym and then never going! (they count on that by the way ) It can be as simple as just going for a walk or doing some gardening, anything that will get you moving.      Hygiene   Maintain good hygiene (get out of bed in the morning, make your bed, brush your teeth, take a shower, and get dressed like you were going to work, even if you are unemployed).  If your clothes don't fit try to get ones that do.    Diet  Strive to eat foods that are good for me, drink plenty of water, and avoid excessive sugar, caffeine, alcohol, and other mood-altering substances.  Some foods that are helpful in depression are: complex carbohydrates, B vitamins, flaxseed, fish or fish oil, fresh fruits and vegetables.    Psychotherapy  Agree to participate in Individual Therapy (if recommended).    Medication  If prescribed medications, I agree to take them.   Missing doses can result in serious side effects.  I understand that drinking alcohol, or other illicit drug use, may cause potential side effects.  I will not stop my medication abruptly without first discussing it with my provider.    Staying Connected With Others  Stay in touch with my friends, family members, and my primary care provider/team.    Use your imagination  Be creative.  We all have a creative side; it doesn t matter if it s oil painting, sand castles, or mud pies! This will also kick up the endorphins.    Witness Beauty  (AKA stop and smell the roses) Take a look outside, even in mid-winter. Notice colors, textures. Watch the squirrels and birds.     Service to others  Be of service to others.  There is always someone else in need.  By helping others we can  get out of ourselves  and remember the really important things.  This also provides opportunities for practicing all the other parts of the program.    Humor  Laugh and be silly!  Adjust your TV habits for less news and crime-drama and more comedy.    Control your stress  Try breathing deep, massage therapy, biofeedback, and meditation. Find time to relax each day.     Crisis Text Line  http://www.crisistextline.org    The Crisis Text Line serves anyone, in any type of crisis, providing access to free, 24/7 support and information via the medium people already use and trust:    Here's how it works:  1.  Text 509-154 from anywhere in the [place], anytime, about any type of crisis.  2.  A live, trained Crisis Counselor receives the text and responds quickly.  3.  The volunteer Crisis Counselor will help you move from a 'hot moment to a cool moment'.    My support system    Clinic Contact:  Phone number:    Contact 1:  Phone number:    Contact 2:  Phone number:    Evangelical/:  Phone number:    Therapist:  Phone number:    Local crisis center:    Phone number:    Other community support:  Phone number:

## 2020-05-18 NOTE — PROGRESS NOTES
Subjective     Milla Bose is a 40 year old female who presents to clinic today for the following health issues:    HPI   Depression and Anxiety Follow-Up    How are you doing with your depression since your last visit? Would like referral to behavior health home - patient states her friend told her about the program and they both thought it would be perfect for her    How are you doing with your anxiety since your last visit?  No change    Are you having other symptoms that might be associated with depression or anxiety? Yes:  PTSD    Have you had a significant life event? No     Do you have any concerns with your use of alcohol or other drugs? 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  Patient states the first time she had her sons taken away, they are staying with her brother in the [facility] area and she can't see them due to COVID travel concerns and restrictions.  She then used again on Mother's Day, due to it being the anniversary of the death of her own mother and being without her sons.  She is committed to staying sober and she notes she is in treatment.  She would like a referral to [facility]    Social History     Tobacco Use     Smoking status: Current Every Day Smoker     Packs/day: 1.00     Years: 20.00     Pack years: 20.00     Types: Cigarettes     Smokeless tobacco: Never Used   Substance Use Topics     Alcohol use: Yes     Comment: rare     Drug use: No     Comment: marijuana     PHQ 3/15/2018 9/19/2018 5/18/2020   PHQ-9 Total Score 9 10 24   Q9: Thoughts of better off dead/self-harm past 2 weeks Not at all Not at all Not at all     BANDAR-7 SCORE 2/13/2018 9/19/2018 5/18/2020   Total Score 7 20 18     Last PHQ-9 5/18/2020   1.  Little interest or pleasure in doing things 3   2.  Feeling down, depressed, or hopeless 3   3.  Trouble falling or staying asleep, or sleeping too much 3   4.  Feeling tired or having little energy 3   5.  Poor appetite or  overeating 3   6.  Feeling bad about yourself 3   7.  Trouble concentrating 3   8.  Moving slowly or restless 3   Q9: Thoughts of better off dead/self-harm past 2 weeks 0   PHQ-9 Total Score 24   Difficulty at work, home, or with people Somewhat difficult     BANDAR-7  5/18/2020   1. Feeling nervous, anxious, or on edge 3   2. Not being able to stop or control worrying 2   3. Worrying too much about different things 2   4. Trouble relaxing 3   5. Being so restless that it is hard to sit still 3   6. Becoming easily annoyed or irritable 2   7. Feeling afraid, as if something awful might happen 3   BANDAR-7 Total Score 18   If you checked any problems, how difficult have they made it for you to do your work, take care of things at home, or get along with other people? Very difficult       Suicide Assessment Five-step Evaluation and Treatment (SAFE-T)        Vaginal Symptoms      Duration: IUD fell out couple weeks ago    Description  vaginal discharge - creamy bloody and left sided abdominal pain and bloating    Intensity:  moderate    Accompanying signs and symptoms (fever/dysuria/abdominal or back pain): abdominal pain and bloating, constipation and now diarrhea    History  Sexually active: yes, single partner, contraception not required  Possibility of pregnancy: No  Recent antibiotic use: YES- 1 month ago    Precipitating or alleviating factors: None    Therapies tried and outcome: none   Outcome: n/a    Patient states she was previously having very heavy vaginal bleeding.  She was given norethidrone which stopped her bleeding, but then a Mirena IUD was placed. It was expelling, so she removed it.  She states yesterday she had thick white discharge, she doesn't think it had an odor.  She is concerned about possible infection.      Dental pain      Duration: months    Description (location/character/radiation): left sided    Intensity:  moderate    Accompanying signs and symptoms: pain and swelling    History (similar  episodes/previous evaluation): None    Precipitating or alleviating factors: None    Therapies tried and outcome: antibiotics, symptoms have improved currently     Patient also notes she is still dealing with a ganglion cyst on her left hand, she doesn't feel that needs to be addressed today.      Patient Active Problem List   Diagnosis     Schizoaffective disorder (H)     PTSD (post-traumatic stress disorder)     Anxiety state     Panic disorder with agoraphobia     Astigmatism     Major depression     Myopia     Personality, multiple (H)     Congenital cataract     Past Surgical History:   Procedure Laterality Date     cyst removal left breast       cyst removed from right ear       GYN SURGERY      laser of cervix       Social History     Tobacco Use     Smoking status: Current Every Day Smoker     Packs/day: 1.00     Years: 20.00     Pack years: 20.00     Types: Cigarettes     Smokeless tobacco: Never Used   Substance Use Topics     Alcohol use: Yes     Comment: rare     Family History   Problem Relation Age of Onset     Heart Disease Mother      Alcohol/Drug Mother      Unknown/Adopted Father      Cancer Paternal Grandmother         Cervical         Current Outpatient Medications   Medication Sig Dispense Refill     Acetaminophen (TYLENOL PO) Take 1,000 mg by mouth every 6 hours as needed for mild pain or fever       ALPRAZolam (XANAX PO) Take 1 mg by mouth 3 times daily        amphetamine-dextroamphetamine (ADDERALL XR) 20 MG 24 hr capsule Take 20 mg by mouth daily       amphetamine-dextroamphetamine (ADDERALL) 20 MG tablet Take 20 mg by mouth 2 times daily       GABAPENTIN PO Take 400 mg by mouth 3 times daily        ibuprofen (ADVIL/MOTRIN) 800 MG tablet TAKE 1 TABLET(800 MG) BY MOUTH EVERY 8 HOURS AS NEEDED FOR MODERATE PAIN 100 tablet 0     medical cannabis (Patient's own supply) See Admin Instructions (The purpose of this order is to document that the patient reports taking medical cannabis.  This is not  "a prescription, and is not used to certify that the patient has a qualifying medical condition.)       Allergies   Allergen Reactions     Codeine GI Disturbance       Reviewed and updated as needed this visit by Provider  Tobacco  Allergies  Meds  Problems  Med Hx  Surg Hx  Fam Hx         Review of Systems   Constitutional, HEENT, cardiovascular, pulmonary, gi and gu systems are negative, except as otherwise noted.      Objective    /80   Pulse 104   Temp 98  F (36.7  C)   Ht 1.549 m (5' 1\")   Wt 61.7 kg (136 lb)   SpO2 99%   BMI 25.70 kg/m    Body mass index is 25.7 kg/m .  Physical Exam   GENERAL: alert and no distress  ABDOMEN: mild fullness, likely stool, no tenderness or guarding noted  PSYCH: mentation appears normal, affect tearful and anxious    Diagnostic Test Results:  Labs reviewed in Epic        Assessment & Plan     1. Moderate episode of recurrent major depressive disorder (H)  We will start with referral to Care Coordination, this is discussed with patient and she agrees this would be a great place to start.  Consider [facility] if appropriate  - CARE COORDINATION REFERRAL    2. Panic disorder with agoraphobia  - CARE COORDINATION REFERRAL    3. Anxiety state  - CARE COORDINATION REFERRAL    4. PTSD (post-traumatic stress disorder)  - CARE COORDINATION REFERRAL    5. Substance abuse (H)  - CARE COORDINATION REFERRAL    6. Vaginal discharge  Labs ordered, will notify patient when available.  - Wet prep  - GC/Chlamydia by PCR - HI,    7. Bartholin cyst  Will refer back to OB/Gyn for evaluation at patient's request  - OB/GYN REFERRAL    8. Abdominal bloating  Patient states she was supposed to have an ultrasound a while ago, but never went.  Test reordered.  - US Abdomen Complete; Future    9. Pain, dental  Continue to monitor symptoms.       Tobacco Cessation:   reports that she has been smoking cigarettes. She has a 20.00 pack-year smoking history. She has never used smokeless " "tobacco.  Tobacco Cessation Action Plan: Information offered: Patient not interested at this time      BMI:   Estimated body mass index is 25.7 kg/m  as calculated from the following:    Height as of this encounter: 1.549 m (5' 1\").    Weight as of this encounter: 61.7 kg (136 lb).       Return if symptoms worsen or fail to improve.     Over 30 minutes are spent with the patient, over 20 minutes of which was in education and counseling regarding current conditions and treatment/therapy options/risks/benefits/etc, including review of current symptoms, discussion of mental health needs and Care Coordination referral, discussion of other referrals ordered, and follow-up planning.      Bonny Valdes MD  [facility] [place]      "

## 2020-05-19 ENCOUNTER — PATIENT OUTREACH (OUTPATIENT)
Dept: CARE COORDINATION | Facility: OTHER | Age: 40
End: 2020-05-19

## 2020-05-19 LAB
C TRACH DNA SPEC QL NAA+PROBE: NOT DETECTED
N GONORRHOEA DNA SPEC QL NAA+PROBE: NOT DETECTED
SPECIMEN SOURCE: NORMAL

## 2020-05-19 ASSESSMENT — ANXIETY QUESTIONNAIRES: GAD7 TOTAL SCORE: 18

## 2020-05-19 NOTE — PROGRESS NOTES
Clinic Care Coordination Contact  Care Team Conversations    CC sent staff message to [facility] in regards to referral placed by PCP for [facility].    Kadie Adkins, GEETA  Care Coordination

## 2020-05-21 ENCOUNTER — TELEPHONE (OUTPATIENT)
Dept: BEHAVIORAL HEALTH | Facility: OTHER | Age: 40
End: 2020-05-21

## 2020-05-21 NOTE — TELEPHONE ENCOUNTER
[facility]  @FLOW([id number])@      Social Work Care Navigator Note      Patient: Milla Bose  Date: May 21, 2020  Preferred Name: Milla    Previous PHQ-9:   PHQ-9 SCORE 3/15/2018 9/19/2018 5/18/2020   PHQ-9 Total Score - - -   PHQ-9 Total Score 9 10 24     Previous BANDAR-7:   BANDAR-7 SCORE 2/13/2018 9/19/2018 5/18/2020   Total Score 7 20 18     BRIELLE LEVEL:  No flowsheet data found.    Preferred Contact: @TU([id number])@  Type of Contact Today: Phone call (patient / identified key support person reached)      Data: (subjective / Objective):  Patient Goals Areas: @FLOW([id number])@  Patient Stated Goals: @FLOW([id number])@  Recent ED/IP Admission or Discharge?   None  Recent ED/IP Admission or Discharge?   None    Patient Goals:  No data recorded      [facility] Core Service Provided:  Comprehensive Care Management: utilized the electronic medical record / patient registry to identify and support patient's health conditions / needs more effectively   Care Coordination: provided care management services/referrals necessary to ensure patient and their identified supports have access to medical, behavioral health, pharmacology and recovery support services.  Ensured that patient's care is integrated across all settings and services.   Health and Wellness Promotion  Individual and Family Support: aimed to help clients reduce barriers to achieving goals, increase health literacy and knowledge about chronic condition(s), increase self-efficacy skills, and improve health outcomes    Current Stressors / Issues / Care Plan Objective Addressed Today:  Cps support, child support, phone, data plan, CD treatment/ therapy    Intervention:  Motivational Interviewing: Expressed Empathy/Understanding, Supported Autonomy, Collaboration, Evocation, Permission to raise concern or advise and Open-ended questions   Target Behavior(s): NA    Assessment: (Progress on Goals / Homework):  Received referral from PCP for [facility] services.   Reached out to pt who was very interested in [facility] and agreed to enroll today.  Advised pt writer would reach out next week to complete health wellness assessment.    Pt advised she is currently in treatment with adam.  She also sees Darwin Gauthier for therapy.  She advised she will contact him regarding [facility] for DA.    Pt advised her children are currently in foster care and are with her brother in the [facility].  She has not been able to visit them due to covid; however, she does frequent video chats.  She is looking for additional data options to be able to continue this, but has been told by cps they do not have any options.  Advised writer will look for available options.    Pt also advised she will be running out of phone minutes on 5/25.  Discussed availability of [employer] link.  She advised she has a [employer] account; however, lost her phone.  Advised writer would look into how to get a replacement.    Pt also stated she would like an additional support person when working with cps. Pt gave permission for writer to speak with cps worker. Advised writer would contact her to update worker regarding current services.  Will also be available as needed for support with cps.    Plan: (Homework, other):  Patient was encouraged to continue to seek condition-related information and education.      Scheduled a Phone follow up appointment with SARINA MCWILLIAMS in 1 week     Patient has set self-identified goals and will monitor progress until the next appointment.     PIPE Levin, Social Work Care Coordinator       YAZMIN Levin, LSW     Next 5 appointments (look out 90 days)    Jun 05, 2020 11:00 AM CDT  (Arrive by 10:45 AM)  Office Visit with Cesar Pop MD  [facility] [place] ([facility] [place] ) [address]  [place] [address]  [phone]

## 2020-05-26 NOTE — PROGRESS NOTES
Clinic Care Coordination Contact  Care Team Conversations    Patient enrolled in [facility] with PIPE Davila.  No further outreach.  Kadie Adkins, GEETA  Care Coordination

## 2020-05-29 ENCOUNTER — VIRTUAL VISIT (OUTPATIENT)
Dept: BEHAVIORAL HEALTH | Facility: OTHER | Age: 40
End: 2020-05-29
Attending: FAMILY MEDICINE
Payer: MEDICARE

## 2020-05-29 DIAGNOSIS — R69 DIAGNOSIS DEFERRED: Primary | ICD-10-CM

## 2020-05-29 NOTE — PROGRESS NOTES
[facility]         Social Work Care Navigator Note      Patient: Milla Bose  Date: May 29, 2020  Preferred Name: Milla    Previous PHQ-9:   PHQ-9 SCORE 3/15/2018 9/19/2018 5/18/2020   PHQ-9 Total Score - - -   PHQ-9 Total Score 9 10 24     Previous BANDAR-7:   BANDAR-7 SCORE 2/13/2018 9/19/2018 5/18/2020   Total Score 7 20 18     BRIELLE LEVEL:  No flowsheet data found.    Preferred Contact: @[facility]([phone])@  Type of Contact Today: Phone call (patient / identified key support person reached)      Data: (subjective / Objective):  Patient Goals Areas: Goal Areas: Social and Interpersonal Relationships  Patient Stated Goals: Patient stated goals: Reunification with my kids  Recent ED/IP Admission or Discharge?   None    Patient came in to complete the comprehensive wellness assessment for [facility].  See [facility] Flowsheets for details on the assessment.  See [facility] for a copy of the patient's care plan.    Pt also asked for assistance with finding a dentist.  Advised writer would look to schedule appt and update pt.  Pt also has meeting with CPS on Monday 6/1 at 9am and asked writer to assist.      PIPE Levin, Social Work Care Coordinator      Referrals/other:dentist    YAZMIN Levin, NARENW     Next 5 appointments (look out 90 days)    Jun 05, 2020 11:00 AM CDT  (Arrive by 10:45 AM)  Office Visit with Cesar Pop MD  [facility] ([facility] [place] ) [address]  [place] [address]  [phone]

## 2020-06-01 ENCOUNTER — TELEPHONE (OUTPATIENT)
Dept: BEHAVIORAL HEALTH | Facility: OTHER | Age: 40
End: 2020-06-01

## 2020-06-02 NOTE — TELEPHONE ENCOUNTER
[facility]  @FLOW([id number])@      Social Work Care Navigator Note      Patient: Milla Bose  Date: June 2, 2020  Preferred Name: Milla    Previous PHQ-9:   PHQ-9 SCORE 3/15/2018 9/19/2018 5/18/2020   PHQ-9 Total Score - - -   PHQ-9 Total Score 9 10 24     Previous BANDAR-7:   BANDAR-7 SCORE 2/13/2018 9/19/2018 5/18/2020   Total Score 7 20 18     BRIELLE LEVEL:  No flowsheet data found.    Preferred Contact: @TU([id number])@  Type of Contact Today: Phone call (patient / identified key support person reached)      Data: (subjective / Objective):  Patient Goals Areas: @FLOW([id number])@  Patient Stated Goals: @FLOW([id number])@  Recent ED/IP Admission or Discharge?   None  Recent ED/IP Admission or Discharge?   None    Patient Goals:  Goal Areas: Social and Interpersonal Relationships  Patient stated goals: Reunification with my kids        [facility] Core Service Provided:  Comprehensive Care Management: utilized the electronic medical record / patient registry to identify and support patient's health conditions / needs more effectively   Care Coordination: provided care management services/referrals necessary to ensure patient and their identified supports have access to medical, behavioral health, pharmacology and recovery support services.  Ensured that patient's care is integrated across all settings and services.   Health and Wellness Promotion  Individual and Family Support: aimed to help clients reduce barriers to achieving goals, increase health literacy and knowledge about chronic condition(s), increase self-efficacy skills, and improve health outcomes    Current Stressors / Issues / Care Plan Objective Addressed Today:  CPS involvement    Intervention:  Motivational Interviewing: Expressed Empathy/Understanding, Supported Autonomy, Collaboration, Evocation, Permission to raise concern or advise, Open-ended questions and Reflections: simple and complex     Assessment: (Progress on Goals / Homework):  Met  with pt via phone along with her CPS worker, Maryabdelrahman Rivera. Pt reported that she has been attending group treatment with [facility] 3 days weekly - M, T, th from 3-4pm and has 1:1s as needed. Pt did state she slipped and used on mother's day, but has been sober other than that occurrence since kids were removed.    Discussed pt's supports in place and plan towards approaching reunification.  She stated her melissa is very important and also is glad to be involved with case management support of [facility].  She is also working on mindfulness/meditation with Darwin Gauthier.    Discussed next steps toward reunification.  Mary advised they were currently in the process of ordering [facility] specialized psychological testing.  After discussing with pt, they also placed referral for intensive family based services to assist in teaching parenting skills.  They will also continue on working the best method of allowing pt to continue supervised visitation with her children until reunification is appropriate.    Will continue to be available and participate in CPS meetings as requested by pt.    Plan: (Homework, other):  Patient was encouraged to continue to seek condition-related information and education.      Scheduled a Phone follow up appointment with SARIAN MCWILLIAMS in 1 week     Patient has set self-identified goals and will monitor progress until the next appointment.     PIPE Levin, Social Work Care Coordinator     YAZMIN Levin, LSW     Next 5 appointments (look out 90 days)    Jun 05, 2020 11:00 AM CDT  (Arrive by 10:45 AM)  Office Visit with Cesar Pop MD  [facility] ([facility] ) [address]  [place] [address]  [phone]

## 2020-06-04 ENCOUNTER — TELEPHONE (OUTPATIENT)
Dept: BEHAVIORAL HEALTH | Facility: OTHER | Age: 40
End: 2020-06-04

## 2020-06-04 NOTE — TELEPHONE ENCOUNTER
[facility]  @FLOW([id number])@      Social Work Care Navigator Note      Patient: Milla Bose  Date: June 4, 2020  Preferred Name: Milla    Previous PHQ-9:   PHQ-9 SCORE 3/15/2018 9/19/2018 5/18/2020   PHQ-9 Total Score - - -   PHQ-9 Total Score 9 10 24     Previous BANDAR-7:   BANDAR-7 SCORE 2/13/2018 9/19/2018 5/18/2020   Total Score 7 20 18     BRIELLE LEVEL:  No flowsheet data found.    Preferred Contact: @TU([id number])@  Type of Contact Today: Phone call (patient / identified key support person reached)      Data: (subjective / Objective):  Patient Goals Areas: @FLOW([id number])@  Patient Stated Goals: @FLOW([id number])@  Recent ED/IP Admission or Discharge?   None  Recent ED/IP Admission or Discharge?   None    Patient Goals:  Goal Areas: Social and Interpersonal Relationships  Patient stated goals: Reunification with my kids        [facility] Core Service Provided:  Comprehensive Care Management: utilized the electronic medical record / patient registry to identify and support patient's health conditions / needs more effectively   Care Coordination: provided care management services/referrals necessary to ensure patient and their identified supports have access to medical, behavioral health, pharmacology and recovery support services.  Ensured that patient's care is integrated across all settings and services.   Health and Wellness Promotion  Individual and Family Support: aimed to help clients reduce barriers to achieving goals, increase health literacy and knowledge about chronic condition(s), increase self-efficacy skills, and improve health outcomes    Current Stressors / Issues / Care Plan Objective Addressed Today:  Dentist      Intervention:  Motivational Interviewing: Expressed Empathy/Understanding, Supported Autonomy, Collaboration, Evocation, Permission to raise concern or advise and Open-ended questions   Target Behavior(s): NA    Assessment: (Progress on Goals / Homework):  Writer contacted  [facility] in [place] and scheduled pt for appt on June 16th at 1:30pm.  Updated pt via text and she thanked writer for the assistance.    Denied needing any other assistance at this time.  She stated she had court yesterday which went well; her next court date is July 21st.      Plan: (Homework, other):  Patient was encouraged to continue to seek condition-related information and education.      Scheduled a Phone follow up appointment with SARINA MCWILILAMS in 1 week     Patient has set self-identified goals and will monitor progress until the next appointment.     PIPE Loredo, Social Work Care Coordinator       Referrals/other:dentist    HERMILO LoredoW, LSW     Next 5 appointments (look out 90 days)    Jun 05, 2020 11:00 AM CDT  (Arrive by 10:45 AM)  Office Visit with Cesar Pop MD  [facility] ([facility] ) [address] [place] [address]  [phone]

## 2020-06-08 ENCOUNTER — TELEPHONE (OUTPATIENT)
Dept: BEHAVIORAL HEALTH | Facility: OTHER | Age: 40
End: 2020-06-08

## 2020-06-09 NOTE — TELEPHONE ENCOUNTER
[facility]  @FLOW([id number])@      Social Work Care Navigator Note      Patient: Milla Bose  Date: June 8, 2020  Preferred Name: Milla    Previous PHQ-9:   PHQ-9 SCORE 3/15/2018 9/19/2018 5/18/2020   PHQ-9 Total Score - - -   PHQ-9 Total Score 9 10 24     Previous BANDAR-7:   BANDAR-7 SCORE 2/13/2018 9/19/2018 5/18/2020   Total Score 7 20 18     BRIELLE LEVEL:  No flowsheet data found.    Preferred Contact: @TU([id number])@  Type of Contact Today: Phone call (patient / identified key support person reached)      Data: (subjective / Objective):  Patient Goals Areas: @FLOW([id number])@  Patient Stated Goals: @FLOW([id number])@  Recent ED/IP Admission or Discharge?   None  Recent ED/IP Admission or Discharge?   None    Patient Goals:  Goal Areas: Social and Interpersonal Relationships  Patient stated goals: Reunification with my kids        [facility] Core Service Provided:  Comprehensive Care Management: utilized the electronic medical record / patient registry to identify and support patient's health conditions / needs more effectively   Care Coordination: provided care management services/referrals necessary to ensure patient and their identified supports have access to medical, behavioral health, pharmacology and recovery support services.  Ensured that patient's care is integrated across all settings and services.   Health and Wellness Promotion    Current Stressors / Issues / Care Plan Objective Addressed Today:  [place] pass    Intervention:  Motivational Interviewing: Expressed Empathy/Understanding, Supported Autonomy, Collaboration, Evocation and Open-ended questions   Target Behavior(s): NA    Assessment: (Progress on Goals / Homework):  Writer received text message from pt inquiring regarding reduced [place] pass [place] to ssi.  After confirming with dnr, advised pt she would be able to get annual pass for $12 with a disability plate/license.  Advised if she did not have this she could apply for  federal access pass and use that to get discounted rate and provided link to website to apply.  She thanked writer for the info and writer encouraged her to reach out if additional assistance was needed.    Plan: (Homework, other):  Patient was encouraged to continue to seek condition-related information and education.      Scheduled a Phone follow up appointment with SARINA MCWILLIAMS in 1 week     Patient has set self-identified goals and will monitor progress until the next appointment.    PIPE Levin, Social Work Care Coordinator     Referrals/other:YAZMIN Miles, LSW

## 2020-06-12 ENCOUNTER — TELEPHONE (OUTPATIENT)
Dept: BEHAVIORAL HEALTH | Facility: OTHER | Age: 40
End: 2020-06-12

## 2020-06-15 ENCOUNTER — TELEPHONE (OUTPATIENT)
Dept: BEHAVIORAL HEALTH | Facility: OTHER | Age: 40
End: 2020-06-15
Payer: MEDICARE

## 2020-06-15 DIAGNOSIS — Z53.9 ERRONEOUS ENCOUNTER--DISREGARD: Primary | ICD-10-CM

## 2020-06-15 NOTE — TELEPHONE ENCOUNTER
[facility]  @FLOW([id number])@      Social Work Care Navigator Note      Patient: Milla Bose  Date: Lisa 15, 2020  Preferred Name: Milla    Previous PHQ-9:   PHQ-9 SCORE 3/15/2018 9/19/2018 5/18/2020   PHQ-9 Total Score - - -   PHQ-9 Total Score 9 10 24     Previous BANDAR-7:   BANDAR-7 SCORE 2/13/2018 9/19/2018 5/18/2020   Total Score 7 20 18     BRIELLE LEVEL:  No flowsheet data found.    Preferred Contact: @TU([id number])@  Type of Contact Today: Phone call (patient / identified key support person reached)      Data: (subjective / Objective):  Patient Goals Areas: @FLOW([id number])@  Patient Stated Goals: @FLOW([id number])@  Recent ED/IP Admission or Discharge?   None  Recent ED/IP Admission or Discharge?   None    Patient Goals:  Goal Areas: Social and Interpersonal Relationships  Patient stated goals: Reunification with my kids    [facility] Core Service Provided:  Comprehensive Care Management: utilized the electronic medical record / patient registry to identify and support patient's health conditions / needs more effectively   Care Coordination: provided care management services/referrals necessary to ensure patient and their identified supports have access to medical, behavioral health, pharmacology and recovery support services.  Ensured that patient's care is integrated across all settings and services.   Individual and Family Support: aimed to help clients reduce barriers to achieving goals, increase health literacy and knowledge about chronic condition(s), increase self-efficacy skills, and improve health outcomes    Current Stressors / Issues / Care Plan Objective Addressed Today:  CPS meeting    Intervention:  Motivational Interviewing: Expressed Empathy/Understanding, Supported Autonomy, Collaboration, Evocation, Permission to raise concern or advise, Open-ended questions and Reflections: simple and complex   Target Behavior(s): Explored current social supports and reinforced opportunities to  "increase engagement    Assessment: (Progress on Goals / Homework):  Received text from pt stating her CPS worker was planning meeting on Monday, 6/15 and asked writer to be present during phone call.  She advised she would update writer once time was set.  Pt stated she recently had a drug test with CPS which came back positive; she stated this was \"impossible\" as she has not used any drugs.  Offered listening support and encouragement for patient's sobriety.      Received email that meeting was at 1pm on 6/15; however, then received text from pt this would not work as she was driving back from the [employer].  Scheduled appt with CPS for 6/16 at 10:30.    Plan: (Homework, other):  Patient was encouraged to continue to seek condition-related information and education.      Scheduled a Phone follow up appointment with SARINA MCWILLIAMS in 1 week     Patient has set self-identified goals and will monitor progress until the next appointment.     PIPE Levin, Social Work Care Coordinator     YAZMIN Levin, LSW     Next 5 appointments (look out 90 days)    Jun 25, 2020 10:00 AM CDT  (Arrive by 9:45 AM)  Office Visit with Cesar Pop MD  [facility] ([facility] ) [address] [place] [address]  [phone]          "

## 2020-06-16 ENCOUNTER — TELEPHONE (OUTPATIENT)
Dept: BEHAVIORAL HEALTH | Facility: OTHER | Age: 40
End: 2020-06-16

## 2020-06-16 NOTE — TELEPHONE ENCOUNTER
[facility]  @FLOW([id number])@      Social Work Care Navigator Note      Patient: Milla Bose  Date: June 16, 2020  Preferred Name: Milla    Previous PHQ-9:   PHQ-9 SCORE 3/15/2018 9/19/2018 5/18/2020   PHQ-9 Total Score - - -   PHQ-9 Total Score 9 10 24     Previous BANDAR-7:   BANDAR-7 SCORE 2/13/2018 9/19/2018 5/18/2020   Total Score 7 20 18     BRIELLE LEVEL:  No flowsheet data found.    Preferred Contact: @TU([id number])@  Type of Contact Today: Phone call (patient / identified key support person reached)      Data: (subjective / Objective):  Patient Goals Areas: @FLOW([id number])@  Patient Stated Goals: @FLOW([id number])@  Recent ED/IP Admission or Discharge?   None  Recent ED/IP Admission or Discharge?   None    Patient Goals:  Goal Areas: Social and Interpersonal Relationships  Patient stated goals: Reunification with my kids        [facility] Core Service Provided:  Comprehensive Care Management: utilized the electronic medical record / patient registry to identify and support patient's health conditions / needs more effectively   Care Coordination: provided care management services/referrals necessary to ensure patient and their identified supports have access to medical, behavioral health, pharmacology and recovery support services.  Ensured that patient's care is integrated across all settings and services.   Health and Wellness Promotion  Individual and Family Support: aimed to help clients reduce barriers to achieving goals, increase health literacy and knowledge about chronic condition(s), increase self-efficacy skills, and improve health outcomes    Current Stressors / Issues / Care Plan Objective Addressed Today:  CPS concerns    Intervention:  Motivational Interviewing: Expressed Empathy/Understanding, Supported Autonomy, Collaboration, Evocation, Permission to raise concern or advise and Open-ended questions   Target Behavior(s): Explored current social supports and reinforced opportunities to  increase engagement    Assessment: (Progress on Goals / Homework):  Had phone CPS meeting with pt and her CPS worker, Mary ([phone]) to discuss current treatment plan.  Mary advised that pt's son is currently struggling with depression and has an appt for potential medications this afternoon.    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  Pt adamantly denied this stating she has been sober.  Discussed additional options for treatment and will continue to remain available.  CPS will continue working on case and performing random drug screens.    Pt did advise she is also starting to work with [facility] this week.    Writer will continue to be part of CPS/pt calls as needed.    Plan: (Homework, other):  Patient was encouraged to continue to seek condition-related information and education.      Scheduled a Phone follow up appointment with SARINA MCWILLIAMS in 1 week     Patient has set self-identified goals and will monitor progress until the next appointment.   PIPE Levin, Social Work Care Coordinator     YAZMIN Levin, NARENW     Next 5 appointments (look out 90 days)    Jun 25, 2020 10:00 AM CDT  (Arrive by 9:45 AM)  Office Visit with Cesar Pop MD  [facility] ([facility] ) [address] [place] [address]  [phone]

## 2020-06-21 ENCOUNTER — HOSPITAL ENCOUNTER (EMERGENCY)
Facility: HOSPITAL | Age: 40
Discharge: HOME | End: 2020-06-21
Attending: NURSE PRACTITIONER | Admitting: NURSE PRACTITIONER
Payer: MEDICARE

## 2020-06-21 VITALS
DIASTOLIC BLOOD PRESSURE: 79 MMHG | TEMPERATURE: 98 F | HEART RATE: 95 BPM | SYSTOLIC BLOOD PRESSURE: 127 MMHG | OXYGEN SATURATION: 98 % | RESPIRATION RATE: 16 BRPM

## 2020-06-21 DIAGNOSIS — G89.18 ACUTE POST-OPERATIVE PAIN: ICD-10-CM

## 2020-06-21 DIAGNOSIS — K04.7 DENTAL INFECTION: Primary | ICD-10-CM

## 2020-06-21 PROCEDURE — 99213 OFFICE O/P EST LOW 20 MIN: CPT | Mod: Z6 | Performed by: NURSE PRACTITIONER

## 2020-06-21 PROCEDURE — G0463 HOSPITAL OUTPT CLINIC VISIT: HCPCS

## 2020-06-21 PROCEDURE — 25000132 ZZH RX MED GY IP 250 OP 250 PS 637: Mod: GY | Performed by: NURSE PRACTITIONER

## 2020-06-21 RX ORDER — TRAMADOL HYDROCHLORIDE 50 MG/1
50 TABLET ORAL ONCE
Status: COMPLETED | OUTPATIENT
Start: 2020-06-21 | End: 2020-06-21

## 2020-06-21 RX ORDER — CLINDAMYCIN HCL 150 MG
300 CAPSULE ORAL ONCE
Status: COMPLETED | OUTPATIENT
Start: 2020-06-21 | End: 2020-06-21

## 2020-06-21 RX ORDER — TRAMADOL HYDROCHLORIDE 50 MG/1
50 TABLET ORAL EVERY 6 HOURS PRN
Qty: 10 TABLET | Refills: 0 | Status: SHIPPED | OUTPATIENT
Start: 2020-06-21 | End: 2020-12-10

## 2020-06-21 RX ORDER — CLINDAMYCIN HCL 300 MG
300 CAPSULE ORAL 3 TIMES DAILY
Qty: 21 CAPSULE | Refills: 0 | Status: SHIPPED | OUTPATIENT
Start: 2020-06-21 | End: 2020-06-28

## 2020-06-21 RX ADMIN — TRAMADOL HYDROCHLORIDE 50 MG: 50 TABLET, FILM COATED ORAL at 17:59

## 2020-06-21 RX ADMIN — CLINDAMYCIN HYDROCHLORIDE 300 MG: 150 CAPSULE ORAL at 17:59

## 2020-06-21 NOTE — ED AVS SNAPSHOT
[facility]  [address] [address]  Phone:  [phone]                                    Milla Bose   MRN: [id number]    Department:  [facility]   Date of Visit:  6/21/2020           After Visit Summary Signature Page    I have received my discharge instructions, and my questions have been answered. I have discussed any challenges I see with this plan with the nurse or doctor.    ..........................................................................................................................................  Patient/Patient Representative Signature      ..........................................................................................................................................  Patient Representative Print Name and Relationship to Patient    ..................................................               ................................................  Date                                   Time    ..........................................................................................................................................  Reviewed by Signature/Title    ...................................................              ..............................................  Date                                               Time          22EPIC Rev 08/18

## 2020-06-21 NOTE — ED TRIAGE NOTES
Pt states she had x3 teeth pulled on Wednesday that were infected. States she continues to have pain and has a foul tasting drainage in her mouth.

## 2020-06-21 NOTE — ED PROVIDER NOTES
"  History     Chief Complaint   Patient presents with     Dental Pain     3 teeth puloled on wednesday, now painful and has \"icky\" taste in her mouth     Throat Pain     left sided throat pain that was there prior to teeth pulling     HPI  Milla Bose is a 40 year old female who presents to  for dental problem. She had 3 teeth pulled on Wednesday by an Emergency dentist. She still has severe pain and is starting to have an abnormal taste in her mouth along with \"white stuff\" coming out of her gums. The abnormal taste started today. She was not started on antibiotics either before or after the dental procedure. She has been doing salt water rinses, taking tylenol and advil at the same time every 4hrs. Denies fever, chest pain, SOB.     Allergies:  Allergies   Allergen Reactions     Codeine GI Disturbance       Problem List:    Patient Active Problem List    Diagnosis Date Noted     Schizoaffective disorder (H) 07/03/2012     Priority: Medium     PTSD (post-traumatic stress disorder) 07/03/2012     Priority: Medium     Anxiety state 07/03/2012     Priority: Medium     Panic disorder with agoraphobia 07/03/2012     Priority: Medium     Major depression 08/03/2011     Priority: Medium     Overview:   IMO Update 10/11       Personality, multiple (H) 08/03/2011     Priority: Medium     Overview:   IMO Update 10/11       Astigmatism 08/31/2009     Priority: Medium     Overview:   IMO Update       Myopia 08/31/2009     Priority: Medium     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     Priority: Medium     Overview:   IMO Update 10/11          Past Medical History:    Past Medical History:   Diagnosis Date     Anxiety 07/03/2012     Asthma,unspecified type, unspecified 07/03/2012     Panic disorder with agoraphobia 07/03/2012     PTSD (post-traumatic stress disorder) 07/03/2012     Schizoaffective disorder 07/03/2012       Past Surgical History:    Past Surgical History:   Procedure Laterality Date     cyst removal left breast   "     cyst removed from right ear       GYN SURGERY      laser of cervix       Family History:    Family History   Problem Relation Age of Onset     Heart Disease Mother      Alcohol/Drug Mother      Unknown/Adopted Father      Cancer Paternal Grandmother         Cervical       Social History:  Marital Status:  Single [1]  Social History     Tobacco Use     Smoking status: Current Every Day Smoker     Packs/day: 0.50     Years: 20.00     Pack years: 10.00     Types: Cigarettes     Smokeless tobacco: Never Used   Substance Use Topics     Alcohol use: Yes     Comment: rare     Drug use: Not Currently     Comment: ex meth user last use mothers day        Medications:    Acetaminophen (TYLENOL PO)  ALPRAZolam (XANAX PO)  amphetamine-dextroamphetamine (ADDERALL) 20 MG tablet  clindamycin (CLEOCIN) 300 MG capsule  GABAPENTIN PO  ibuprofen (ADVIL/MOTRIN) 800 MG tablet  medical cannabis (Patient's own supply)  traMADol (ULTRAM) 50 MG tablet          Review of Systems   HENT: Positive for dental problem.    All other systems reviewed and are negative.      Physical Exam   BP: 127/79  Pulse: 95  Temp: 98  F (36.7  C)  Resp: 16  SpO2: 98 %      Physical Exam  Vitals signs and nursing note reviewed.   Constitutional:       Appearance: Normal appearance. She is not ill-appearing or toxic-appearing.   HENT:      Head: Normocephalic.      Mouth/Throat:      Lips: Pink.      Mouth: Mucous membranes are moist.      Dentition: Dental tenderness and gingival swelling present. No dental caries or dental abscesses.      Tongue: No lesions.      Pharynx: Oropharynx is clear. Uvula midline. No oropharyngeal exudate or posterior oropharyngeal erythema.      Tonsils: No tonsillar exudate or tonsillar abscesses.      Comments: No obvious dental abscess. Open wounds where teeth were removed to left lower  gumline. Wounds appear to be healing well. No discharge visualized to entire mouth.    Eyes:      Pupils: Pupils are equal, round, and  reactive to light.   Neck:      Musculoskeletal: Neck supple.   Cardiovascular:      Rate and Rhythm: Normal rate and regular rhythm.      Heart sounds: Normal heart sounds.   Pulmonary:      Effort: Pulmonary effort is normal.      Breath sounds: Normal breath sounds.   Skin:     General: Skin is warm and dry.      Capillary Refill: Capillary refill takes less than 2 seconds.   Neurological:      Mental Status: She is alert and oriented to person, place, and time.         ED Course        Procedures               No results found for this or any previous visit (from the past 24 hour(s)).    Medications   traMADol (ULTRAM) tablet 50 mg (has no administration in time range)   clindamycin (CLEOCIN) capsule 300 mg (has no administration in time range)       Assessments & Plan (with Medical Decision Making)   #1 Dental infection:  Patient had 3 teeth removed 5 days ago and reports continued pain along with discharge from gums. No obvious dental abscess. Open wounds where teeth were removed to left lower  gumline. Wounds appear to be healing well. No discharge visualized to entire mouth.      Will prescribe clindamycin and tramadol. Recommended salt water rinses, APAP/ibuprofen as needed for pain. Advised her to schedule an appointment with a dentist for reevaluation of teeth. Patient verbalized understanding.    I have reviewed the nursing notes.    I have reviewed the findings, diagnosis, plan and need for follow up with the patient.      New Prescriptions    CLINDAMYCIN (CLEOCIN) 300 MG CAPSULE    Take 1 capsule (300 mg) by mouth 3 times daily for 7 days    TRAMADOL (ULTRAM) 50 MG TABLET    Take 1 tablet (50 mg) by mouth every 6 hours as needed for severe pain       Final diagnoses:   Dental infection   Acute post-operative pain       6/21/2020   [facility]     Mpokrys, Melvinnce, CNP  06/24/20 [id number]

## 2020-06-25 ENCOUNTER — TELEPHONE (OUTPATIENT)
Dept: OBGYN | Facility: OTHER | Age: 40
End: 2020-06-25

## 2020-06-25 NOTE — TELEPHONE ENCOUNTER
7:56 AM    Reason for Call: Phone Call    Description: pt had to cancel today/ please call to reschedule Thanks    Was an appointment offered for this call? No  If yes : Appointment type              Date    Preferred method for responding to this message: Telephone Call  What is your phone number ? [phone]    If we cannot reach you directly, may we leave a detailed response at the number you provided? Yes    Can this message wait until your PCP/provider returns, if available today? YES, No, provider is in    Joelle Reyes

## 2020-06-29 ENCOUNTER — TELEPHONE (OUTPATIENT)
Dept: BEHAVIORAL HEALTH | Facility: OTHER | Age: 40
End: 2020-06-29

## 2020-06-30 NOTE — TELEPHONE ENCOUNTER
[facility]  @FLOW([id number])@      Social Work Care Navigator Note      Patient: Milla Bose  Date: June 30, 2020  Preferred Name: Milla    Previous PHQ-9:   PHQ-9 SCORE 3/15/2018 9/19/2018 5/18/2020   PHQ-9 Total Score - - -   PHQ-9 Total Score 9 10 24     Previous BANDAR-7:   BANDAR-7 SCORE 2/13/2018 9/19/2018 5/18/2020   Total Score 7 20 18     BRIELLE LEVEL:  No flowsheet data found.    Preferred Contact: @TU([id number])@  Type of Contact Today: Phone call (not reached/unavailable)      Data: (subjective / Objective):  Patient Goals Areas: @FLOW([id number])@  Patient Stated Goals: @FLOW([id number])@  Recent ED/IP Admission or Discharge?   None  Attempted to reach patient, but was unsuccessful.  Plan to attempt again.  PIPE Levin LSW  Social Work Care Coordinator  [facility]  [phone] option 2 or [phone]

## 2020-07-01 ENCOUNTER — TRANSFERRED RECORDS (OUTPATIENT)
Dept: HEALTH INFORMATION MANAGEMENT | Facility: CLINIC | Age: 40
End: 2020-07-01

## 2020-07-01 LAB
ALT SERPL-CCNC: 21 (ref 6–31)
AST SERPL-CCNC: 19 IU/L (ref 10–40)
CREAT SERPL-MCNC: 0.79 MG/DL (ref 0.4–1)
GFR SERPL CREATININE-BSD FRML MDRD: >60 ML/MIN/1.73M*2
GLUCOSE SERPL-MCNC: 128 MG/DL (ref 70–99)
POTASSIUM SERPL-SCNC: 3.4 MEQ/L (ref 3.4–5.1)

## 2020-07-07 ENCOUNTER — TELEPHONE (OUTPATIENT)
Dept: BEHAVIORAL HEALTH | Facility: OTHER | Age: 40
End: 2020-07-07

## 2020-07-09 ENCOUNTER — TELEPHONE (OUTPATIENT)
Dept: BEHAVIORAL HEALTH | Facility: OTHER | Age: 40
End: 2020-07-09

## 2020-07-09 NOTE — TELEPHONE ENCOUNTER
[facility]  @FLOW([id number])@      Social Work Care Navigator Note      Patient: Milla Bose  Date: July 9, 2020  Preferred Name: Milla    Previous PHQ-9:   PHQ-9 SCORE 3/15/2018 9/19/2018 5/18/2020   PHQ-9 Total Score - - -   PHQ-9 Total Score 9 10 24     Previous BANDAR-7:   BANDAR-7 SCORE 2/13/2018 9/19/2018 5/18/2020   Total Score 7 20 18     BRIELLE LEVEL:  No flowsheet data found.    Preferred Contact: @TU([id number])@  Type of Contact Today: Phone call (patient / identified key support person reached)      Data: (subjective / Objective):  Patient Goals Areas: @FLOW([id number])@  Patient Stated Goals: @FLOW([id number])@  Recent ED/IP Admission or Discharge?   None  Recent ED/IP Admission or Discharge?   None    Patient Goals:  Goal Areas: Social and Interpersonal Relationships  Patient stated goals: Reunification with my kids        [facility] Core Service Provided:  Comprehensive Care Management: utilized the electronic medical record / patient registry to identify and support patient's health conditions / needs more effectively   Care Coordination: provided care management services/referrals necessary to ensure patient and their identified supports have access to medical, behavioral health, pharmacology and recovery support services.  Ensured that patient's care is integrated across all settings and services.   Health and Wellness Promotion  Individual and Family Support: aimed to help clients reduce barriers to achieving goals, increase health literacy and knowledge about chronic condition(s), increase self-efficacy skills, and improve health outcomes    Current Stressors / Issues / Care Plan Objective Addressed Today:  Ride to treatment/ new phone number    Intervention:  Motivational Interviewing: Expressed Empathy/Understanding, Supported Autonomy, Collaboration, Evocation, Permission to raise concern or advise and Open-ended questions   Target Behavior(s): Explored and resolved challenges to  attending appointments as scheduled    Assessment: (Progress on Goals / Homework):  Pt contacted writer due to needing assistance with rides. Her car was broke down and she needed a way to get to treatment.  Writer was out of the office at the time and when contacting her upon return she had borrowed a bike from her neighbor.  Did ensure pt had the number for [employer] ride if needing rides in the future.    Pt also asked writer to verify her MA number as pt lost ID card.  Confirmed MA number.  Pt also advised she had a new phone number which writer updated in epic.      Plan: (Homework, other):  Patient was encouraged to continue to seek condition-related information and education.      Scheduled a Phone follow up appointment with SARINA MCWILLIAMS in 1 week     Patient has set self-identified goals and will monitor progress until the next appointment.    PIPE Levin, Social Work Care Coordinator       PIPE Levin  Social Work Care Coordinator  [facility]  [phone] option 2 or [phone]

## 2020-07-10 NOTE — TELEPHONE ENCOUNTER
[facility]  @FLOW([id number])@      Social Work Care Navigator Note      Patient: Milla Bose  Date: July 10, 2020  Preferred Name: Milla    Previous PHQ-9:   PHQ-9 SCORE 3/15/2018 9/19/2018 5/18/2020   PHQ-9 Total Score - - -   PHQ-9 Total Score 9 10 24     Previous BANDAR-7:   BANDAR-7 SCORE 2/13/2018 9/19/2018 5/18/2020   Total Score 7 20 18     BRIELLE LEVEL:  No flowsheet data found.    Preferred Contact: @TU([id number])@  Type of Contact Today: Phone call (patient / identified key support person reached)      Data: (subjective / Objective):  Patient Goals Areas: @FLOW([id number])@  Patient Stated Goals: @FLOW([id number])@  Recent ED/IP Admission or Discharge?   None  Recent ED/IP Admission or Discharge?   None    Patient Goals:  Goal Areas: Social and Interpersonal Relationships  Patient stated goals: Reunification with my kids        [facility] Core Service Provided:  Comprehensive Care Management: utilized the electronic medical record / patient registry to identify and support patient's health conditions / needs more effectively   Care Coordination: provided care management services/referrals necessary to ensure patient and their identified supports have access to medical, behavioral health, pharmacology and recovery support services.  Ensured that patient's care is integrated across all settings and services.   Health and Wellness Promotion  Individual and Family Support: aimed to help clients reduce barriers to achieving goals, increase health literacy and knowledge about chronic condition(s), increase self-efficacy skills, and improve health outcomes    Current Stressors / Issues / Care Plan Objective Addressed Today:  CPS visit    Intervention:  Motivational Interviewing: Expressed Empathy/Understanding, Supported Autonomy, Collaboration, Evocation, Permission to raise concern or advise and Open-ended questions   Target Behavior(s): Explored current social supports and reinforced opportunities to  increase engagement    Assessment: (Progress on Goals / Homework):  Pt contacted writer as she received her drug screen results back today.  She was slightly confused as she felt they were contradictory.  She explained the mouth result was positive for THC and negative for meth while the hair sample was negative for THC but positive for meth.  Advised this should be addressed with Mary, her CPS worker, at next conversation.    She also explained she is having difficulty with her upcoming visit to her kids this weekend.  She explained last Wednesday Mary reached out to her stating a visit was being scheduled for July 11 and she would arrange transportation, but needed to confirm pt's address. Pt was in a treatment session at the time so was unable to respond.  Mary was on vacation after that so pt has attempted to contact Mary's supervisor, but has not had success.  Now today she received word he was also on vacation.    Writer attempted to call [facility] to see who could assist with this, but they stated they were not sure as there were no notes in the system.  [facility] is [facility].  LVM with concerns and updated pt.  She did state she was contacted by an Rozina with the [facility] who is going to try her best to get something arranged.      Will remain available.    Plan: (Homework, other):  Patient was encouraged to continue to seek condition-related information and education.      Scheduled a Phone follow up appointment with SARINA MCWILLIAMS in 2 weeks     Patient has set self-identified goals and will monitor progress until the next appointment.    PIPE Levin, Social Work Care Coordinator     PIPE Levin  Social Work Care Coordinator  [facility]  [phone] option 2 or [phone]

## 2020-07-15 ENCOUNTER — TELEPHONE (OUTPATIENT)
Dept: BEHAVIORAL HEALTH | Facility: OTHER | Age: 40
End: 2020-07-15

## 2020-07-16 NOTE — TELEPHONE ENCOUNTER
[facility]  @FLOW([id number])@      Social Work Care Navigator Note      Patient: Milla Bose  Date: July 16, 2020  Preferred Name: Milla    Previous PHQ-9:   PHQ-9 SCORE 3/15/2018 9/19/2018 5/18/2020   PHQ-9 Total Score - - -   PHQ-9 Total Score 9 10 24     Previous BANDAR-7:   BANDAR-7 SCORE 2/13/2018 9/19/2018 5/18/2020   Total Score 7 20 18     BRIELLE LEVEL:  No flowsheet data found.    Preferred Contact: @TU([id number])@  Type of Contact Today: Phone call (patient / identified key support person reached)      Data: (subjective / Objective):  Patient Goals Areas: @FLOW([id number])@  Patient Stated Goals: @FLOW([id number])@  Recent ED/IP Admission or Discharge?   None  Recent ED/IP Admission or Discharge?   None    Patient Goals:  Goal Areas: Social and Interpersonal Relationships  Patient stated goals: Reunification with my kids        [facility] Core Service Provided:  Comprehensive Care Management: utilized the electronic medical record / patient registry to identify and support patient's health conditions / needs more effectively   Care Coordination: provided care management services/referrals necessary to ensure patient and their identified supports have access to medical, behavioral health, pharmacology and recovery support services.  Ensured that patient's care is integrated across all settings and services.   Health and Wellness Promotion  Individual and Family Support: aimed to help clients reduce barriers to achieving goals, increase health literacy and knowledge about chronic condition(s), increase self-efficacy skills, and improve health outcomes    Current Stressors / Issues / Care Plan Objective Addressed Today:  CPS    Intervention:  Motivational Interviewing: Expressed Empathy/Understanding, Supported Autonomy, Collaboration, Evocation, Permission to raise concern or advise, Open-ended questions and Reflections: simple and complex   Target Behavior(s): Explored current social supports and  reinforced opportunities to increase engagement    Assessment: (Progress on Goals / Homework):  Pt reached out to natyr on 7/14 advising she would be meeting with her CPS worker at 2:00pm on Wednesday 7/15 and asked writer to participate.    Natyr received text message from pt on 7/15 stating meeting would need to be cancelled due to her phone being out of minutes.     did receive call from pt's CPS worker to confirm writer knew meeting was cancelled.  Had extensive conversation with CPS regarding status of pt's case. Discussed differences in drug tests and Mary explained this was due to the length of time that each test goes back.  The mouth swab which was clean for meth goes back 3 days where has hair follicle which was meth positive goes 3 months.   She reported that pt has been denying signing any releases for CPS (other than with writer) to be involved which has meant Mary has been unable to speak with therapist or her treatment providers. She is approaching her 6 mo edu which is the first important date in which county may apply for permanency hearing.  Mary advised pt needs to start working with them and showing additional progress to prevent this from occurring.   will continue to offer pt support and encourage her to work with CPS as much as possible.    WIll remain available.  Plan: (Homework, other):  Patient was encouraged to continue to seek condition-related information and education.      Scheduled a Phone follow up appointment with SARINA MCWILLIAMS in 1 week     Patient has set self-identified goals and will monitor progress until the next appointment.   PIPE Levin, Social Work Care Coordinator     PIPE Levin  Social Work Care Coordinator  [facility]  [phone] option 2 or [phone]

## 2020-07-21 ENCOUNTER — TELEPHONE (OUTPATIENT)
Dept: BEHAVIORAL HEALTH | Facility: OTHER | Age: 40
End: 2020-07-21

## 2020-07-22 NOTE — TELEPHONE ENCOUNTER
[facility]  @FLOW([id number])@      Social Work Care Navigator Note      Patient: Milla Bose  Date: July 21, 2020  Preferred Name: Milla    Previous PHQ-9:   PHQ-9 SCORE 3/15/2018 9/19/2018 5/18/2020   PHQ-9 Total Score - - -   PHQ-9 Total Score 9 10 24     Previous BANDAR-7:   BANDAR-7 SCORE 2/13/2018 9/19/2018 5/18/2020   Total Score 7 20 18     BRIELLE LEVEL:  No flowsheet data found.    Preferred Contact: @TU([id number])@  Type of Contact Today: Phone call (patient / identified key support person reached)      Data: (subjective / Objective):  Patient Goals Areas: @FLOW([id number])@  Patient Stated Goals: @FLOW([id number])@  Recent ED/IP Admission or Discharge?   None  Recent ED/IP Admission or Discharge?   None    Patient Goals:  Goal Areas: Social and Interpersonal Relationships  Patient stated goals: Reunification with my kids        [facility] Core Service Provided:  Comprehensive Care Management: utilized the electronic medical record / patient registry to identify and support patient's health conditions / needs more effectively   Care Transitions: focused on the coordinated and seamless movement of patient between or within different levels of care or settings  Care Coordination: provided care management services/referrals necessary to ensure patient and their identified supports have access to medical, behavioral health, pharmacology and recovery support services.  Ensured that patient's care is integrated across all settings and services.   Health and Wellness Promotion  Individual and Family Support: aimed to help clients reduce barriers to achieving goals, increase health literacy and knowledge about chronic condition(s), increase self-efficacy skills, and improve health outcomes    Current Stressors / Issues / Care Plan Objective Addressed Today:   Custody hearing    Intervention:  Motivational Interviewing: Expressed Empathy/Understanding, Supported Autonomy, Collaboration, Evocation, Permission to  raise concern or advise, Open-ended questions and Reflections: simple and complex   Target Behavior(s): Explored current social supports and reinforced opportunities to increase engagement    Assessment: (Progress on Goals / Homework):   Pt contacted writer stating she had a permanency hearing today.  She was very discouraged as the court documents painted a very negative picture.  Pt asked if writer would be willing to write a letter to the court regarding her work with this writer.       Writer completed letter regarding work so far with pt and updated pt it had been sent.  Asked pt to update writer with how court proceedings went.     * Writer received text message from pt on 7/22 stating court went well.  They were unable to do permanency hearing yesterday due to it being too early, so it was changed to 8/18.    PIPE Levin  Social Work Care Coordinator  [facility]  [phone] option 2 or [phone]        Plan: (Homework, other):  Patient was encouraged to continue to seek condition-related information and education.      Scheduled a Phone follow up appointment with SARINA MCWILLIAMS in 1 week     Patient has set self-identified goals and will monitor progress until the next appointment.   PIPE Levin, Social Work Care Coordinator       YAZMIN Levin, NARENW

## 2020-07-28 ENCOUNTER — TELEPHONE (OUTPATIENT)
Dept: BEHAVIORAL HEALTH | Facility: OTHER | Age: 40
End: 2020-07-28

## 2020-07-30 ENCOUNTER — TELEPHONE (OUTPATIENT)
Dept: BEHAVIORAL HEALTH | Facility: OTHER | Age: 40
End: 2020-07-30

## 2020-07-30 ENCOUNTER — TELEPHONE (OUTPATIENT)
Dept: BEHAVIORAL HEALTH | Facility: OTHER | Age: 40
End: 2020-07-30
Payer: MEDICARE

## 2020-07-30 DIAGNOSIS — Z53.9 ERRONEOUS ENCOUNTER--DISREGARD: Primary | ICD-10-CM

## 2020-07-30 NOTE — TELEPHONE ENCOUNTER
[facility]  @FLOW([id number])@      Social Work Care Navigator Note      Patient: Milla Bose  Date: July 30, 2020  Preferred Name: Milla    Previous PHQ-9:   PHQ-9 SCORE 3/15/2018 9/19/2018 5/18/2020   PHQ-9 Total Score - - -   PHQ-9 Total Score 9 10 24     Previous BANDAR-7:   BANDAR-7 SCORE 2/13/2018 9/19/2018 5/18/2020   Total Score 7 20 18     BRIELLE LEVEL:  No flowsheet data found.    Preferred Contact: @TU([id number])@  Type of Contact Today: Phone call (patient / identified key support person reached)      Data: (subjective / Objective):  Patient Goals Areas: @FLOW([id number])@  Patient Stated Goals: @FLOW([id number])@  Recent ED/IP Admission or Discharge?   None  Recent ED/IP Admission or Discharge?   None    Patient Goals:  Goal Areas: Social and Interpersonal Relationships  Patient stated goals: Reunification with my kids        [facility] Core Service Provided:  Comprehensive Care Management: utilized the electronic medical record / patient registry to identify and support patient's health conditions / needs more effectively   Care Transitions: focused on the coordinated and seamless movement of patient between or within different levels of care or settings  Care Coordination: provided care management services/referrals necessary to ensure patient and their identified supports have access to medical, behavioral health, pharmacology and recovery support services.  Ensured that patient's care is integrated across all settings and services.   Health and Wellness Promotion  Individual and Family Support: aimed to help clients reduce barriers to achieving goals, increase health literacy and knowledge about chronic condition(s), increase self-efficacy skills, and improve health outcomes    Current Stressors / Issues / Care Plan Objective Addressed Today:  CPS    Intervention:  Motivational Interviewing: Expressed Empathy/Understanding, Supported Autonomy, Collaboration, Evocation, Permission to raise  concern or advise and Open-ended questions   Target Behavior(s): Explored current social supports and reinforced opportunities to increase engagement    Assessment: (Progress on Goals / Homework):  Received message from pt stating they were looking at setting up another CPS meeting this week via [employer] due to lack of cell reception.  After discussion, meeting was setup for 7/30 @ 11am.    Pt did advise that she has signed the county EVANS's and has agreed to a urine drug test.  She also updated writer that she was  and baptized last week.  She also has spoken with her kids therapist to determine the skills pt can use to assist her kids.      She was concerned as she wants gas cards to drive herself to supervised visits with her kids in the [facility], but has been told this is not an option.  Advised for long distance they often require rides due to reliability of transportation and cost effectiveness.    Will remain available.    Plan: (Homework, other):  Patient was encouraged to continue to seek condition-related information and education.      Scheduled a Phone follow up appointment with SARINA MCWILLIAMS in 1 week     Patient has set self-identified goals and will monitor progress until the next appointment.    PIPE Levin, Social Work Care Coordinator     PIPE Levin  Social Work Care Coordinator  [facility]  [phone] option 2 or [phone]

## 2020-07-31 NOTE — TELEPHONE ENCOUNTER
[facility]  @FLOW([id number])@      Social Work Care Navigator Note      Patient: Milla Bose  Date: July 30, 2020  Preferred Name: Milla    Previous PHQ-9:   PHQ-9 SCORE 3/15/2018 9/19/2018 5/18/2020   PHQ-9 Total Score - - -   PHQ-9 Total Score 9 10 24     Previous BANDAR-7:   BANDAR-7 SCORE 2/13/2018 9/19/2018 5/18/2020   Total Score 7 20 18     BRIELLE LEVEL:  No flowsheet data found.    Preferred Contact: @TU([id number])@  Type of Contact Today: Phone call (patient / identified key support person reached)      Data: (subjective / Objective):  Patient Goals Areas: @FLOW([id number])@  Patient Stated Goals: @FLOW([id number])@  Recent ED/IP Admission or Discharge?   None  Recent ED/IP Admission or Discharge?   None    Patient Goals:  Goal Areas: Social and Interpersonal Relationships  Patient stated goals: Reunification with my kids        [facility] Core Service Provided:  Comprehensive Care Management: utilized the electronic medical record / patient registry to identify and support patient's health conditions / needs more effectively   Care Transitions: focused on the coordinated and seamless movement of patient between or within different levels of care or settings  Care Coordination: provided care management services/referrals necessary to ensure patient and their identified supports have access to medical, behavioral health, pharmacology and recovery support services.  Ensured that patient's care is integrated across all settings and services.   Individual and Family Support: aimed to help clients reduce barriers to achieving goals, increase health literacy and knowledge about chronic condition(s), increase self-efficacy skills, and improve health outcomes    Current Stressors / Issues / Care Plan Objective Addressed Today:  CPS    Intervention:  Motivational Interviewing: Expressed Empathy/Understanding, Supported Autonomy, Collaboration, Evocation, Permission to raise concern or advise and Open-ended  questions   Target Behavior(s): NA    Assessment: (Progress on Goals / Homework):  Met with pt and her CPS worker, Mary ([phone]) via phone.  Offered listening support as pt and Mary discussed the current process towards reunification.  Pt did confirm again she did sign the requested ROIs and has agreed to urine drug screens which started.  Also discussed the beginning of supervised visits now that these sites are open again.  County verified that they will not be providing gas vouchers, but only transportation drivers.   Pt's next court date is in August.    Will continue to remain available for support as needed.    Plan: (Homework, other):  Patient was encouraged to continue to seek condition-related information and education.      Scheduled a Phone follow up appointment with SARINA MCWILLIAMS in 1 week     Patient has set self-identified goals and will monitor progress until the next appointment.    PIPE Levin, Social Work Care Coordinator     PIPE Levin  Social Work Care Coordinator  [facility]  [phone] option 2 or [phone]

## 2020-08-05 ENCOUNTER — TELEPHONE (OUTPATIENT)
Dept: BEHAVIORAL HEALTH | Facility: OTHER | Age: 40
End: 2020-08-05

## 2020-08-06 ENCOUNTER — TELEPHONE (OUTPATIENT)
Dept: BEHAVIORAL HEALTH | Facility: OTHER | Age: 40
End: 2020-08-06

## 2020-08-06 NOTE — TELEPHONE ENCOUNTER
[facility]  @FLOW([id number])@      Social Work Care Navigator Note      Patient: Milla Bose  Date: August 6, 2020  Preferred Name: Milla    Previous PHQ-9:   PHQ-9 SCORE 3/15/2018 9/19/2018 5/18/2020   PHQ-9 Total Score - - -   PHQ-9 Total Score 9 10 24     Previous BANDAR-7:   BANDAR-7 SCORE 2/13/2018 9/19/2018 5/18/2020   Total Score 7 20 18     BRIELLE LEVEL:  No flowsheet data found.    Preferred Contact: @TU([id number])@  Type of Contact Today: Phone call (patient / identified key support person reached)      Data: (subjective / Objective):  Patient Goals Areas: @FLOW([id number])@  Patient Stated Goals: @FLOW([id number])@  Recent ED/IP Admission or Discharge?   None  Recent ED/IP Admission or Discharge?   None    Patient Goals:  Goal Areas: Social and Interpersonal Relationships  Patient stated goals: Reunification with my kids        [facility] Core Service Provided:  Comprehensive Care Management: utilized the electronic medical record / patient registry to identify and support patient's health conditions / needs more effectively   Care Coordination: provided care management services/referrals necessary to ensure patient and their identified supports have access to medical, behavioral health, pharmacology and recovery support services.  Ensured that patient's care is integrated across all settings and services.   Health and Wellness Promotion  Individual and Family Support: aimed to help clients reduce barriers to achieving goals, increase health literacy and knowledge about chronic condition(s), increase self-efficacy skills, and improve health outcomes    Current Stressors / Issues / Care Plan Objective Addressed Today:  DA    Intervention:  Motivational Interviewing: Expressed Empathy/Understanding, Supported Autonomy, Collaboration, Evocation, Permission to raise concern or advise and Open-ended questions   Target Behavior(s): NA    Assessment: (Progress on Goals / Homework):  Pt contacted writer  asking where she should have DA sent.  Advised her it could be faxed to [phone].  She stated understanding and advised she would ask her provider.    Also received message from pt's CPS worker advising a weekly meeting was being setup on Thursdays at 10:30am with pt.      Will remain available.    Plan: (Homework, other):  Patient was encouraged to continue to seek condition-related information and education.      Scheduled a Phone follow up appointment with SARINA MCWILLIAMS in 1 week     Patient has set self-identified goals and will monitor progress until the next appointment.   PIPE Levin, Social Work Care Coordinator     PIPE Levin  Social Work Care Coordinator  Behavioral [facility]  [phone] option 2 or [phone]

## 2020-08-07 NOTE — TELEPHONE ENCOUNTER
[facility]  @FLOW([id number])@      Social Work Care Navigator Note      Patient: Milla Bose  Date: August 7, 2020  Preferred Name: Milla    Previous PHQ-9:   PHQ-9 SCORE 3/15/2018 9/19/2018 5/18/2020   PHQ-9 Total Score - - -   PHQ-9 Total Score 9 10 24     Previous BANDAR-7:   BANDAR-7 SCORE 2/13/2018 9/19/2018 5/18/2020   Total Score 7 20 18     BRIELLE LEVEL:  No flowsheet data found.    Preferred Contact: @TU([id number])@  Type of Contact Today: Phone call (patient / identified key support person reached)      Data: (subjective / Objective):  Patient Goals Areas: @FLOW([id number])@  Patient Stated Goals: @FLOW([id number])@  Recent ED/IP Admission or Discharge?   None  Recent ED/IP Admission or Discharge?   None    Patient Goals:  Goal Areas: Social and Interpersonal Relationships  Patient stated goals: Reunification with my kids        [facility] Core Service Provided:  Comprehensive Care Management: utilized the electronic medical record / patient registry to identify and support patient's health conditions / needs more effectively   Care Coordination: provided care management services/referrals necessary to ensure patient and their identified supports have access to medical, behavioral health, pharmacology and recovery support services.  Ensured that patient's care is integrated across all settings and services.   Health and Wellness Promotion  Individual and Family Support: aimed to help clients reduce barriers to achieving goals, increase health literacy and knowledge about chronic condition(s), increase self-efficacy skills, and improve health outcomes    Current Stressors / Issues / Care Plan Objective Addressed Today:  CPS    Intervention:  Motivational Interviewing: Expressed Empathy/Understanding, Supported Autonomy, Collaboration, Evocation, Permission to raise concern or advise and Open-ended questions   Target Behavior(s): NA    Assessment: (Progress on Goals / Homework):  Spoke with pt and her  CPS worker, Mary.  Received an update regarding current status.  Pt has court update in August and permanency hearing in September.  CPS worker has not yet received EVANS's.  On 8/5, pt was unable to complete urine drug test but did give a mouth swab.      They are attempting to get a visit with her children this weekend and supervised visits will like start next week with child wise in the [facility].      Weekly meetings were now changed to 11am on Thursdays.    *did receive update that visit was scheduled for 8/8 from 9:30-1:30.  A visit was also available for today from 3-5pm. However, pt did not have transportation and county was unable to give [place] card.  Offered extensive listening support related to this concern.  Will continue to remain available.    Plan: (Homework, other):  Patient was encouraged to continue to seek condition-related information and education.      Scheduled a Phone follow up appointment with SARINA MCWILLIAMS in 1 week     Patient has set self-identified goals and will monitor progress until the next appointment.   PIPE Levin, Social Work Care Coordinator         PIPE Levin  Social Work Care Coordinator  [facility] [place]  [phone] option 2 or [phone]

## 2020-08-10 ENCOUNTER — TELEPHONE (OUTPATIENT)
Dept: BEHAVIORAL HEALTH | Facility: OTHER | Age: 40
End: 2020-08-10

## 2020-08-11 NOTE — TELEPHONE ENCOUNTER
[facility]  @FLOW([id number])@      Social Work Care Navigator Note      Patient: Milla Bose  Date: August 11, 2020  Preferred Name: Milla    Previous PHQ-9:   PHQ-9 SCORE 3/15/2018 9/19/2018 5/18/2020   PHQ-9 Total Score - - -   PHQ-9 Total Score 9 10 24     Previous BANDAR-7:   BANDAR-7 SCORE 2/13/2018 9/19/2018 5/18/2020   Total Score 7 20 18     BRIELLE LEVEL:  No flowsheet data found.    Preferred Contact: @TU([id number])@  Type of Contact Today: Phone call (not reached/unavailable)      Data: (subjective / Objective):  Patient Goals Areas: @FLOW([id number])@  Patient Stated Goals: @FLOW([id number])@  Recent ED/IP Admission or Discharge?   None  Recent ED/IP Admission or Discharge?   None    Patient Goals:  Goal Areas: Social and Interpersonal Relationships  Patient stated goals: Reunification with my kids        [facility] Core Service Provided:  Comprehensive Care Management: utilized the electronic medical record / patient registry to identify and support patient's health conditions / needs more effectively   Care Transitions: focused on the coordinated and seamless movement of patient between or within different levels of care or settings  Care Coordination: provided care management services/referrals necessary to ensure patient and their identified supports have access to medical, behavioral health, pharmacology and recovery support services.  Ensured that patient's care is integrated across all settings and services.     Current Stressors / Issues / Care Plan Objective Addressed Today:  CPS visits    Intervention:  Motivational Interviewing: Expressed Empathy/Understanding, Supported Autonomy, Collaboration, Evocation, Permission to raise concern or advise and Open-ended questions   Target Behavior(s): NA    Assessment: (Progress on Goals / Homework):   Pt reached out to writer stating she was having difficulties with CPS visits.  They have not been setting up visits with enough notice that it has been  difficult finding drivers.  They are also still refusing to do a gas card; she advised she would like to drive herself as [place]      Writer spoke with THAD Zayas regarding prior  experience.  After discussion, writer advised pt to continue working with her  for increased visits/ investigating whether rights are being violated by restriction of her driving to visits.  CPS worker did update writer and pt that visits were scheduled for august 15,22nd, September 5, September 19th, and October 3rd in addition to the visits with family wise. Will remain available.  Plan: (Homework, other):  Patient was encouraged to continue to seek condition-related information and education.      Scheduled a Phone follow up appointment with SARINA MCWILLIAMS in 1 week     Patient has set self-identified goals and will monitor progress until the next appointment.     PIPE Levin, Social Work Care Coordinator     PIPE Levin  Social Work Care Coordinator  [facility]  [phone] option 2 or [phone]

## 2020-08-13 ENCOUNTER — TELEPHONE (OUTPATIENT)
Dept: BEHAVIORAL HEALTH | Facility: OTHER | Age: 40
End: 2020-08-13

## 2020-08-14 NOTE — TELEPHONE ENCOUNTER
[facility]  @FLOW([id number])@      Social Work Care Navigator Note      Patient: Milla Bose  Date: August 14, 2020  Preferred Name: Milla    Previous PHQ-9:   PHQ-9 SCORE 3/15/2018 9/19/2018 5/18/2020   PHQ-9 Total Score - - -   PHQ-9 Total Score 9 10 24     Previous BANDAR-7:   BANDAR-7 SCORE 2/13/2018 9/19/2018 5/18/2020   Total Score 7 20 18     BRIELLE LEVEL:  No flowsheet data found.    Preferred Contact: @TU([id number])@  Type of Contact Today: Phone call (patient / identified key support person reached)      Data: (subjective / Objective):  Patient Goals Areas: @FLOW([id number])@  Patient Stated Goals: @FLOW([id number])@  Recent ED/IP Admission or Discharge?   None  Recent ED/IP Admission or Discharge?   None    Patient Goals:  Goal Areas: Social and Interpersonal Relationships  Patient stated goals: Reunification with my kids        [facility] Core Service Provided:  Comprehensive Care Management: utilized the electronic medical record / patient registry to identify and support patient's health conditions / needs more effectively   Care Coordination: provided care management services/referrals necessary to ensure patient and their identified supports have access to medical, behavioral health, pharmacology and recovery support services.  Ensured that patient's care is integrated across all settings and services.   Health and Wellness Promotion  Individual and Family Support: aimed to help clients reduce barriers to achieving goals, increase health literacy and knowledge about chronic condition(s), increase self-efficacy skills, and improve health outcomes    Current Stressors / Issues / Care Plan Objective Addressed Today:  CPS meeting    Intervention:  Motivational Interviewing: Expressed Empathy/Understanding, Supported Autonomy, Collaboration, Evocation, Permission to raise concern or advise and Open-ended questions   Target Behavior(s): na    Assessment: (Progress on Goals / Homework):  Writer met  with pt and CPS worker, Mary ACOSTA  During today's meeting worked on completing questionnaires for upcoming child developmental assessments.      Mary stated things were going well and was happy with the progress pt has made in the last few weeks.  It is very likely that they will request 6mo extension rather than filing for permanency petition.      Pt is continuing to participate in treatment, therapy, [facility] services, weekly visits with CPS, etc.    Will remain available.    Plan: (Homework, other):  Patient was encouraged to continue to seek condition-related information and education.      Scheduled a Phone follow up appointment with SARINA MCWILLIAMS in 1 week     Patient has set self-identified goals and will monitor progress until the next appointment.   PIPE Levin, Social Work Care Coordinator     PIPE Levin  Social Work Care Coordinator  [facility]  [phone] option 2 or [phone]      Next 5 appointments (look out 90 days)    Aug 25, 2020  9:00 AM CDT  (Arrive by 8:45 AM)  SHORT with Bonny Valdes MD  [facility] ([facility] ) [address]  [facility] [address]  [phone]

## 2020-08-20 ENCOUNTER — TELEPHONE (OUTPATIENT)
Dept: BEHAVIORAL HEALTH | Facility: OTHER | Age: 40
End: 2020-08-20

## 2020-08-24 NOTE — TELEPHONE ENCOUNTER
[facility]  @FLOW([id number])@      Social Work Care Navigator Note      Patient: Milla Bose  Date: August 24, 2020  Preferred Name: Milla    Previous PHQ-9:   PHQ-9 SCORE 3/15/2018 9/19/2018 5/18/2020   PHQ-9 Total Score - - -   PHQ-9 Total Score 9 10 24     Previous BANDAR-7:   BANDAR-7 SCORE 2/13/2018 9/19/2018 5/18/2020   Total Score 7 20 18     BRIELLE LEVEL:  No flowsheet data found.    Preferred Contact: @TU([id number])@  Type of Contact Today: Phone call (patient / identified key support person reached)      Data: (subjective / Objective):  Patient Goals Areas: @FLOW([id number])@  Patient Stated Goals: @FLOW([id number])@  Recent ED/IP Admission or Discharge?   None  Recent ED/IP Admission or Discharge?   None    Patient Goals:  Goal Areas: Social and Interpersonal Relationships  Patient stated goals: Reunification with my kids        [facility] Core Service Provided:  Comprehensive Care Management: utilized the electronic medical record / patient registry to identify and support patient's health conditions / needs more effectively   Care Coordination: provided care management services/referrals necessary to ensure patient and their identified supports have access to medical, behavioral health, pharmacology and recovery support services.  Ensured that patient's care is integrated across all settings and services.   Health and Wellness Promotion    Current Stressors / Issues / Care Plan Objective Addressed Today:  CPS visit    Intervention:  Motivational Interviewing: Expressed Empathy/Understanding, Supported Autonomy, Collaboration, Evocation, Permission to raise concern or advise and Open-ended questions   Target Behavior(s): Explored current social supports and reinforced opportunities to increase engagement    Assessment: (Progress on Goals / Homework):  Writer participated in phone call with pt and her CPS worker, Mary.  Mary stated things were going very well.  Pt now has visits scheduled with her  kids every other week on Saturdays for 4 hrs.  She is also starting supervised visits on 8/25 every Tuesday from 6:30-8pm.     Mary advised they will continue working on this towards allowing pt to have increased visits.  They are also starting the process of having random drug screens with pt's  to assess his ability to be with the children.    Pt's next court date is in September which will likely be asking for a continuance before permanency is set.    Plan: (Homework, other):  Patient was encouraged to continue to seek condition-related information and education.      Scheduled a Phone follow up appointment with SARINA MCWILLIAMS in 1 week     Patient has set self-identified goals and will monitor progress until the next appointment.   PIPE Levin, Social Work Care Coordinator       PIPE Levin  Social Work Care Coordinator  [facility] [place]  [phone] option 2 or [phone]      Next 5 appointments (look out 90 days)    Aug 25, 2020  9:00 AM CDT  (Arrive by 8:45 AM)  SHORT with Bonny Valdes MD  [facility] ([facility] ) [address]  [place] [address]  [phone]

## 2020-08-25 ENCOUNTER — TELEPHONE (OUTPATIENT)
Dept: BEHAVIORAL HEALTH | Facility: OTHER | Age: 40
End: 2020-08-25

## 2020-08-27 ENCOUNTER — TELEPHONE (OUTPATIENT)
Dept: BEHAVIORAL HEALTH | Facility: OTHER | Age: 40
End: 2020-08-27

## 2020-08-28 NOTE — TELEPHONE ENCOUNTER
[facility]  @FLOW([id number])@      Social Work Care Navigator Note      Patient: Milla Bose  Date: August 28, 2020  Preferred Name: Milla    Previous PHQ-9:   PHQ-9 SCORE 3/15/2018 9/19/2018 5/18/2020   PHQ-9 Total Score - - -   PHQ-9 Total Score 9 10 24     Previous BANDAR-7:   BANDAR-7 SCORE 2/13/2018 9/19/2018 5/18/2020   Total Score 7 20 18     BRIELLE LEVEL:  No flowsheet data found.    Preferred Contact: @TU([id number])@  Type of Contact Today: Phone call (patient / identified key support person reached)      Data: (subjective / Objective):  Patient Goals Areas: @FLOW([id number])@  Patient Stated Goals: @FLOW([id number])@  Recent ED/IP Admission or Discharge?   None  Recent ED/IP Admission or Discharge?   None    Patient Goals:  Goal Areas: Social and Interpersonal Relationships  Patient stated goals: Reunification with my kids        [facility] Core Service Provided:  Comprehensive Care Management: utilized the electronic medical record / patient registry to identify and support patient's health conditions / needs more effectively   Care Coordination: provided care management services/referrals necessary to ensure patient and their identified supports have access to medical, behavioral health, pharmacology and recovery support services.  Ensured that patient's care is integrated across all settings and services.   Health and Wellness Promotion    Current Stressors / Issues / Care Plan Objective Addressed Today:  stressors    Intervention:  Motivational Interviewing: Expressed Empathy/Understanding, Supported Autonomy, Collaboration, Evocation, Permission to raise concern or advise and Open-ended questions   Target Behavior(s): Explored patient's perception of how alcohol and / or drugs influences mood    Assessment: (Progress on Goals / Homework):  Pt reached out to writer and Mary, cps worker.  She advised that she had cancelled her supervised visit with her kids today.  Her back is really bad and she  did not feel she would be able to handle another 8 hr drive.  Her anxiety was extremely bad and she stated she was experiencing her first cravings again for meth.  Offered listening support, encouraged her to practice needed self-care, and encouraged her to reach for additional support if needed.    Plan: (Homework, other):  Patient was encouraged to continue to seek condition-related information and education.      Scheduled a Phone follow up appointment with SARINA MCWILLIAMS in 1 week     Patient has set self-identified goals and will monitor progress until the next appointment.   PIPE Levin, Social Work Care Coordinator     PIPE Levin  Social Work Care Coordinator  Behavioral [facility]  [phone] option 2 or [phone]

## 2020-08-31 NOTE — TELEPHONE ENCOUNTER
[facility]  @FLOW([id number])@      Social Work Care Navigator Note      Patient: Milla Bose  Date: August 31, 2020  Preferred Name: Milla    Previous PHQ-9:   PHQ-9 SCORE 3/15/2018 9/19/2018 5/18/2020   PHQ-9 Total Score - - -   PHQ-9 Total Score 9 10 24     Previous BANDAR-7:   BANDAR-7 SCORE 2/13/2018 9/19/2018 5/18/2020   Total Score 7 20 18     BRIELLE LEVEL:  No flowsheet data found.    Preferred Contact: @TU([id number])@  Type of Contact Today: Phone call (not reached/unavailable)      Data: (subjective / Objective):  Patient Goals Areas: @FLOW([id number])@  Patient Stated Goals: @FLOW([id number])@  Recent ED/IP Admission or Discharge?   None  Recent ED/IP Admission or Discharge?   None    Patient Goals:  Goal Areas: Social and Interpersonal Relationships  Patient stated goals: Reunification with my kids        [facility] Core Service Provided:  Comprehensive Care Management: utilized the electronic medical record / patient registry to identify and support patient's health conditions / needs more effectively   Care Coordination: provided care management services/referrals necessary to ensure patient and their identified supports have access to medical, behavioral health, pharmacology and recovery support services.  Ensured that patient's care is integrated across all settings and services.   Health and Wellness Promotion  Individual and Family Support: aimed to help clients reduce barriers to achieving goals, increase health literacy and knowledge about chronic condition(s), increase self-efficacy skills, and improve health outcomes    Current Stressors / Issues / Care Plan Objective Addressed Today:  CPS/visitation    Intervention:  Motivational Interviewing: Expressed Empathy/Understanding, Supported Autonomy, Collaboration, Evocation, Permission to raise concern or advise and Open-ended questions   Target Behavior(s): NA    Assessment: (Progress on Goals / Homework):  Contacted pt for weekly CPS phone  visit.  Attempted to contact Mary with pt, but she was unavailable.  Offered listening support to pt regarding current stressors and mental health symptoms.  Pt also expressed concerns regarding times of supervised visitation being after boy's bedtime and long distance (8 hr ride) for a 1 1/2 hour visit.  Advised her to discuss this with Mary.    Later received text from Mary that she was off and would plan to meet next week.        Plan: (Homework, other):  Patient was encouraged to continue to seek condition-related information and education.      Scheduled a Phone follow up appointment with SARINA MCWILLIAMS in 1 week     Patient has set self-identified goals and will monitor progress until the next appointment.   PIPE eLvin, Social Work Care Coordinator       PIPE Levin  Social Work Care Coordinator  [facility]  [phone] option 2 or [phone]

## 2020-09-02 ENCOUNTER — TELEPHONE (OUTPATIENT)
Dept: BEHAVIORAL HEALTH | Facility: OTHER | Age: 40
End: 2020-09-02

## 2020-09-03 ENCOUNTER — TELEPHONE (OUTPATIENT)
Dept: BEHAVIORAL HEALTH | Facility: OTHER | Age: 40
End: 2020-09-03

## 2020-09-03 NOTE — TELEPHONE ENCOUNTER
[facility]  @FLOW([id number])@      Social Work Care Navigator Note      Patient: Milla Bose  Date: September 3, 2020  Preferred Name: Milla    Previous PHQ-9:   PHQ-9 SCORE 3/15/2018 9/19/2018 5/18/2020   PHQ-9 Total Score - - -   PHQ-9 Total Score 9 10 24     Previous BANDAR-7:   BANDAR-7 SCORE 2/13/2018 9/19/2018 5/18/2020   Total Score 7 20 18     BRIELLE LEVEL:  No flowsheet data found.    Preferred Contact: @TU([id number])@  Type of Contact Today: Phone call (patient / identified key support person reached)      Data: (subjective / Objective):  Patient Goals Areas: @FLOW([id number])@  Patient Stated Goals: @FLOW([id number])@  Recent ED/IP Admission or Discharge?   None  Recent ED/IP Admission or Discharge?   None    Patient Goals:  Goal Areas: Social and Interpersonal Relationships  Patient stated goals: Reunification with my kids        [facility] Core Service Provided:  Comprehensive Care Management: utilized the electronic medical record / patient registry to identify and support patient's health conditions / needs more effectively   Care Coordination: provided care management services/referrals necessary to ensure patient and their identified supports have access to medical, behavioral health, pharmacology and recovery support services.  Ensured that patient's care is integrated across all settings and services.   Health and Wellness Promotion    Current Stressors / Issues / Care Plan Objective Addressed Today:  CPS visit    Intervention:  Motivational Interviewing: Expressed Empathy/Understanding, Supported Autonomy, Collaboration, Evocation, Permission to raise concern or advise and Open-ended questions   Target Behavior(s): NA    Assessment: (Progress on Goals / Homework):  Met with pt and CPS worker Mary.  Discussed supervised visits and Mary agreed to have pt's  go with to ease anxiety of riding with a stranger, but he will not be allowed to enter the visit; both pt and  were ok  with this.    The permanency planning hearing will be occurring this month and the plan is to request a 6-mo extension.      Next week's meeting was cancelled as Mary will be on vacation. Writer will remain available.    Plan: (Homework, other):  Patient was encouraged to continue to seek condition-related information and education.      Scheduled a Phone follow up appointment with SARINA MCWILLIAMS in 1 week     Patient has set self-identified goals and will monitor progress until the next appointment.   PIPE Levin, Social Work Care Coordinator   PIPE Levin  Social Work Care Coordinator  Behavioral [facility]  [phone] option 2 or [phone]

## 2020-09-09 NOTE — TELEPHONE ENCOUNTER
[facility]  @FLOW([id number])@      Social Work Care Navigator Note      Patient: Milla Bose  Date: September 9, 2020  Preferred Name: Milla    Previous PHQ-9:   PHQ-9 SCORE 3/15/2018 9/19/2018 5/18/2020   PHQ-9 Total Score - - -   PHQ-9 Total Score 9 10 24     Previous BANDAR-7:   BANDAR-7 SCORE 2/13/2018 9/19/2018 5/18/2020   Total Score 7 20 18     BRIELLE LEVEL:  No flowsheet data found.    Preferred Contact: @TU([id number])@  Type of Contact Today: Phone call (not reached/unavailable)      Data: (subjective / Objective):  Patient Goals Areas: @FLOW([id number])@  Patient Stated Goals: @FLOW([id number])@  Recent ED/IP Admission or Discharge?   None  Attempted to reach patient to remind her to send writer her DA, but was unsuccessful.  Plan to attempt again.  PIPE Levin LSW  Social Work Care Coordinator  [facility]  [phone] option 2 or [phone]

## 2020-09-10 ENCOUNTER — TELEPHONE (OUTPATIENT)
Dept: BEHAVIORAL HEALTH | Facility: OTHER | Age: 40
End: 2020-09-10

## 2020-09-15 NOTE — TELEPHONE ENCOUNTER
[facility]  @FLOW([id number])@      Social Work Care Navigator Note      Patient: Milla Bose  Date: September 15, 2020  Preferred Name: Milla    Previous PHQ-9:   PHQ-9 SCORE 3/15/2018 9/19/2018 5/18/2020   PHQ-9 Total Score - - -   PHQ-9 Total Score 9 10 24     Previous BANDAR-7:   BANDAR-7 SCORE 2/13/2018 9/19/2018 5/18/2020   Total Score 7 20 18     BRIELLE LEVEL:  No flowsheet data found.    Preferred Contact: @TU([id number])@  Type of Contact Today: Phone call (patient / identified key support person reached)      Data: (subjective / Objective):  Patient Goals Areas: @FLOW([id number])@  Patient Stated Goals: @FLOW([id number])@  Recent ED/IP Admission or Discharge?   None  Recent ED/IP Admission or Discharge?   None    Patient Goals:  Goal Areas: Social and Interpersonal Relationships  Patient stated goals: Reunification with my kids        [facility] Core Service Provided:  Comprehensive Care Management: utilized the electronic medical record / patient registry to identify and support patient's health conditions / needs more effectively   Care Coordination: provided care management services/referrals necessary to ensure patient and their identified supports have access to medical, behavioral health, pharmacology and recovery support services.  Ensured that patient's care is integrated across all settings and services.   Health and Wellness Promotion    Current Stressors / Issues / Care Plan Objective Addressed Today:  Family planning meeting    Intervention:  Motivational Interviewing: Expressed Empathy/Understanding, Supported Autonomy, Collaboration, Evocation, Permission to raise concern or advise and Open-ended questions   Target Behavior(s): NA    Assessment: (Progress on Goals / Homework):  Writer received message from pt verifying if meeting was still on with CPS worker.  Received another message stating she realized Mary was on vacation.  Pt did state her family planning meeting was scheduled for  9/18 from 5-7 pm and would like writer to participate.  Advised writer will plan on being there.    Will continue to remain available.    Plan: (Homework, other):  Patient was encouraged to continue to seek condition-related information and education.      Scheduled a Phone follow up appointment with SARINA MCWILLIAMS in 1 week     Patient has set self-identified goals and will monitor progress until the next appointment. PIPE Levin, Social Work Care Coordinator     PIPE Levin  Social Work Care Coordinator  [facility]  [phone] option 2 or [phone]

## 2020-09-17 ENCOUNTER — TELEPHONE (OUTPATIENT)
Dept: BEHAVIORAL HEALTH | Facility: OTHER | Age: 40
End: 2020-09-17

## 2020-09-18 ENCOUNTER — TELEPHONE (OUTPATIENT)
Dept: BEHAVIORAL HEALTH | Facility: OTHER | Age: 40
End: 2020-09-18

## 2020-09-24 ENCOUNTER — TELEPHONE (OUTPATIENT)
Dept: BEHAVIORAL HEALTH | Facility: OTHER | Age: 40
End: 2020-09-24

## 2020-09-24 NOTE — TELEPHONE ENCOUNTER
[facility]  @FLOW([id number])@      Social Work Care Navigator Note      Patient: Milla Bose  Date: September 24, 2020  Preferred Name: Milla    Previous PHQ-9:   PHQ-9 SCORE 3/15/2018 9/19/2018 5/18/2020   PHQ-9 Total Score - - -   PHQ-9 Total Score 9 10 24     Previous BANDAR-7:   BANDAR-7 SCORE 2/13/2018 9/19/2018 5/18/2020   Total Score 7 20 18     BRIELLE LEVEL:  No flowsheet data found.    Preferred Contact: @TU([id number])@  Type of Contact Today: Phone call (not reached/unavailable)      Data: (subjective / Objective):  Patient Goals Areas: @FLOW([id number])@  Patient Stated Goals: @FLOW([id number])@  Recent ED/IP Admission or Discharge?   None  Recent ED/IP Admission or Discharge?   None    Patient Goals:  Goal Areas: Social and Interpersonal Relationships  Patient stated goals: Reunification with my kids        [facility] Core Service Provided:  Comprehensive Care Management: utilized the electronic medical record / patient registry to identify and support patient's health conditions / needs more effectively   Care Coordination: provided care management services/referrals necessary to ensure patient and their identified supports have access to medical, behavioral health, pharmacology and recovery support services.  Ensured that patient's care is integrated across all settings and services.   Health and Wellness Promotion  Individual and Family Support: aimed to help clients reduce barriers to achieving goals, increase health literacy and knowledge about chronic condition(s), increase self-efficacy skills, and improve health outcomes    Current Stressors / Issues / Care Plan Objective Addressed Today:  Diagnostic assessment    Intervention:  Motivational Interviewing: Expressed Empathy/Understanding, Supported Autonomy, Collaboration, Evocation, Permission to raise concern or advise and Open-ended questions   Target Behavior(s): NA    Assessment: (Progress on Goals / Homework):  Pt contacted writer to  advise CPS worker, Mary, was still out of the office so would not be meeting today.  Pt stated things are going very well.  Reminded writer of her safety planning meeting on 9/18 at 5pm and asked writer to attend.  Advised writer would be there.      Also reminded pt to send DA which she stated she would.  Will continue to remain available.    Plan: (Homework, other):  Patient was encouraged to continue to seek condition-related information and education.      Scheduled a Phone follow up appointment with SARINA MCWILLIAMS in 1 week     Patient has set self-identified goals and will monitor progress until the next appointment.   PIPE Levin, Social Work Care Coordinator         PIPE Levin  Social Work Care Coordinator  [facility]  [phone] option 2 or [phone]

## 2020-09-24 NOTE — TELEPHONE ENCOUNTER
[facility]  @FLOW([id number])@      Social Work Care Navigator Note      Patient: Milla Bose  Date: September 24, 2020  Preferred Name: Milla    Previous PHQ-9:   PHQ-9 SCORE 3/15/2018 9/19/2018 5/18/2020   PHQ-9 Total Score - - -   PHQ-9 Total Score 9 10 24     Previous BANDAR-7:   BANDAR-7 SCORE 2/13/2018 9/19/2018 5/18/2020   Total Score 7 20 18     BRIELLE LEVEL:  No flowsheet data found.    Preferred Contact: @TU([id number])@  Type of Contact Today: Phone call (patient / identified key support person reached)      Data: (subjective / Objective):  Patient Goals Areas: @FLOW([id number])@  Patient Stated Goals: @FLOW([id number])@  Recent ED/IP Admission or Discharge?   None  Recent ED/IP Admission or Discharge?   None    Patient Goals:  Goal Areas: Social and Interpersonal Relationships  Patient stated goals: Reunification with my kids        [facility] Core Service Provided:  Comprehensive Care Management: utilized the electronic medical record / patient registry to identify and support patient's health conditions / needs more effectively   Care Coordination: provided care management services/referrals necessary to ensure patient and their identified supports have access to medical, behavioral health, pharmacology and recovery support services.  Ensured that patient's care is integrated across all settings and services.   Health and Wellness Promotion    Current Stressors / Issues / Care Plan Objective Addressed Today:  CPS weekly meeting    Intervention:  Motivational Interviewing: Expressed Empathy/Understanding, Supported Autonomy, Collaboration, Evocation, Permission to raise concern or advise and Open-ended questions   Target Behavior(s): NA    Assessment: (Progress on Goals / Homework):  Writer met with pt and her CPS worker Mary for weekly meeting.  Mary stated things are going very well.  Pt will no longer need to have supervised face time visits and can do it directly with kids/foster mom.  Pt was  unable to have scheduled visit last weekend due to [facility] not arranging a ride.  A makeup visit has been scheduled for this weekend and a ride was arranged with [employer].  Next week meeting will be with pt, CPS worker, and pt's  Seth, to complete safety planning for introducing Seth into the picture.    Will remain available.    Plan: (Homework, other):  Patient was encouraged to continue to seek condition-related information and education.      Scheduled a Phone follow up appointment with SARINA MCWILLIAMS in 1 week     Patient has set self-identified goals and will monitor progress until the next appointment.   PIPE Levin, Social Work Care Coordinator     PIPE Levin  Social Work Care Coordinator  [facility]  [phone] option 2 or [phone]

## 2020-09-25 NOTE — TELEPHONE ENCOUNTER
[facility]  @FLOW([id number])@      Social Work Care Navigator Note      Patient: Milla Bose  Date: September 18, 2020  Preferred Name: Milla    Previous PHQ-9:   PHQ-9 SCORE 3/15/2018 9/19/2018 5/18/2020   PHQ-9 Total Score - - -   PHQ-9 Total Score 9 10 24     Previous BANDAR-7:   BANDAR-7 SCORE 2/13/2018 9/19/2018 5/18/2020   Total Score 7 20 18     BRIELLE LEVEL:  No flowsheet data found.    Preferred Contact: @TU([id number])@  Type of Contact Today: Phone call (patient / identified key support person reached)      Data: (subjective / Objective):  Patient Goals Areas: @FLOW([id number])@  Patient Stated Goals: @FLOW([id number])@  Recent ED/IP Admission or Discharge?   None  Recent ED/IP Admission or Discharge?   None    Patient Goals:  Goal Areas: Social and Interpersonal Relationships  Patient stated goals: Reunification with my kids        [facility] Core Service Provided:  Comprehensive Care Management: utilized the electronic medical record / patient registry to identify and support patient's health conditions / needs more effectively   Care Coordination: provided care management services/referrals necessary to ensure patient and their identified supports have access to medical, behavioral health, pharmacology and recovery support services.  Ensured that patient's care is integrated across all settings and services.   Health and Wellness Promotion    Current Stressors / Issues / Care Plan Objective Addressed Today:  [facility] plan of care    Intervention:  Motivational Interviewing: Expressed Empathy/Understanding, Supported Autonomy, Collaboration, Evocation, Permission to raise concern or advise and Open-ended questions   Target Behavior(s): NA    Assessment: (Progress on Goals / Homework):  Writer attended CPS safety planning meeting per pt;s request.  The strengths (including pt maintaining sobriety, working with CPS and other treatment providers, motivation to change, etc) were discussed as wall as  potential shortfalls (stress that will come when reunited with the boys, ensuring support is available to prevent relapse.     It was confirmed that overall pt is doing extremely well.  The next steps are to continue increasing the opportunity for visits with pt's children.  They are going to be looking at the option of semi-supervised visits rather than only supervised.  Pt is also to continue working with [facility], counselor, and other treatment providers to ensure ongoing support with the ultimate goal of reunification with her children.    Pt has court on 9/22 and it will be recommended to continue working towards reunification.    Plan: (Homework, other):  Patient was encouraged to continue to seek condition-related information and education.      Scheduled a Phone follow up appointment with SARINA MCWILLIAMS in 1 week     Patient has set self-identified goals and will monitor progress until the next appointment.      PIPE Levin, Social Work Care Coordinator     PIPE Levin  Social Work Care Coordinator  [facility]  [phone] option 2 or [phone]

## 2020-10-01 ENCOUNTER — TELEPHONE (OUTPATIENT)
Dept: BEHAVIORAL HEALTH | Facility: OTHER | Age: 40
End: 2020-10-01

## 2020-10-01 NOTE — TELEPHONE ENCOUNTER
[facility]  @FLOW([id number])@      Social Work Care Navigator Note      Patient: Milla Bose  Date: October 5, 2020  Preferred Name: Milla    Previous PHQ-9:   PHQ-9 SCORE 3/15/2018 9/19/2018 5/18/2020   PHQ-9 Total Score - - -   PHQ-9 Total Score 9 10 24     Previous BANDAR-7:   BANDAR-7 SCORE 2/13/2018 9/19/2018 5/18/2020   Total Score 7 20 18     BRIELLE LEVEL:  No flowsheet data found.    Preferred Contact: @TU([id number])@  Type of Contact Today: Phone call (patient / identified key support person reached)      Data: (subjective / Objective):  Patient Goals Areas: @FLOW([id number])@  Patient Stated Goals: @FLOW([id number])@  Recent ED/IP Admission or Discharge?   None  Recent ED/IP Admission or Discharge?   None    Patient Goals:  Goal Areas: Social and Interpersonal Relationships  Patient stated goals: Reunification with my kids        [facility] Core Service Provided:  Comprehensive Care Management: utilized the electronic medical record / patient registry to identify and support patient's health conditions / needs more effectively   Care Coordination: provided care management services/referrals necessary to ensure patient and their identified supports have access to medical, behavioral health, pharmacology and recovery support services.  Ensured that patient's care is integrated across all settings and services.   Health and Wellness Promotion  Individual and Family Support: aimed to help clients reduce barriers to achieving goals, increase health literacy and knowledge about chronic condition(s), increase self-efficacy skills, and improve health outcomes    Current Stressors / Issues / Care Plan Objective Addressed Today:  CPS visit/ dental pain    Intervention:  Motivational Interviewing: Expressed Empathy/Understanding, Supported Autonomy, Collaboration, Evocation, Permission to raise concern or advise and Open-ended questions   Target Behavior(s): NA    Assessment: (Progress on Goals /  Homework):  Received notification from Mary, CPS worker, that today's meeting would be cancelled.  Writer reached out to pt to see how things are going.  She stated she would be sending writer her DA and also needed a new dental appt.    Writer contacted [facility] who scheduled pt for October 14 @ 10:45.  Writer contacted darrian[employer] and arranged ride with [email or web address].  Updated pt and will remain available.      Plan: (Homework, other):  Patient was encouraged to continue to seek condition-related information and education.      Scheduled a Phone follow up appointment with SARINA MCWILLIAMS in 1 week     Patient has set self-identified goals and will monitor progress until the next appointment. PIPE Levin, Social Work Care Coordinator     PIPE Levin  Social Work Care Coordinator  [facility]  [phone] option 2 or [phone]

## 2020-10-08 ENCOUNTER — VIRTUAL VISIT (OUTPATIENT)
Dept: BEHAVIORAL HEALTH | Facility: OTHER | Age: 40
End: 2020-10-08
Attending: FAMILY MEDICINE
Payer: MEDICARE

## 2020-10-08 DIAGNOSIS — R69 DIAGNOSIS DEFERRED: Primary | ICD-10-CM

## 2020-10-08 NOTE — PROGRESS NOTES
[facility]         Social Work Care Navigator Note      Patient: Milla Bose  Date: October 8, 2020  Preferred Name: Milla    Previous PHQ-9:   PHQ-9 SCORE 3/15/2018 9/19/2018 5/18/2020   PHQ-9 Total Score - - -   PHQ-9 Total Score 9 10 24     Previous BANDAR-7:   BANDAR-7 SCORE 2/13/2018 9/19/2018 5/18/2020   Total Score 7 20 18     BRIELLE LEVEL:  No flowsheet data found.    Preferred Contact: @[facility]([id number])@  Type of Contact Today: Phone call (patient / identified key support person reached)      Data: (subjective / Objective):  Patient Goals Areas:    Patient Stated Goals:    Recent ED/IP Admission or Discharge?   None  Recent ED/IP Admission or Discharge?   None    Patient Goals:  Goal Areas: Social and Interpersonal Relationships  Patient stated goals: Reunification with my kids        [facility] Core Service Provided:  Comprehensive Care Management: utilized the electronic medical record / patient registry to identify and support patient's health conditions / needs more effectively   Care Coordination: provided care management services/referrals necessary to ensure patient and their identified supports have access to medical, behavioral health, pharmacology and recovery support services.  Ensured that patient's care is integrated across all settings and services.   Health and Wellness Promotion  Individual and Family Support: aimed to help clients reduce barriers to achieving goals, increase health literacy and knowledge about chronic condition(s), increase self-efficacy skills, and improve health outcomes    Current Stressors / Issues / Care Plan Objective Addressed Today:  CPS/ safety planning    Intervention:  Motivational Interviewing: Expressed Empathy/Understanding, Supported Autonomy, Collaboration, Evocation, Permission to raise concern or advise and Open-ended questions   Target Behavior(s): NA    Assessment: (Progress on Goals / Homework):  Met with pt, her  Seth, and CPS worker  Mary.  Mary advised the [facility] was ready to start expanding visits. Patient will be having two-day visit (staying separately from the kids) on 10/16-10/17, 10/31-11/1, and 11/13-11/14.  They are also looking at the process for getting a visit for Thanksgiving at which time unsupervised overnights may start to occur barring any unforeseen circumstances.    Also performed safety planning for pt's  due to a lengthy criminal history and past CPS involvement.  Pt met and  her  since the kids were removed, so also discussed the process of integrating him into the family.    Finally, finalized the guidelines for both pt and her 's use of medical marijuana (both have medical card).  They were advised they would be unable to use marijuana when being sole caretaker of the kids and would need to store all drugs/equipment in a safe area outside of kids reach.  Both pt and  stated they were planning to stop marijuana very soon.    Also reminded pt of needing her DA.  She advised she was unable to find on her computer and would contact Disha farias to send copy.     Will plan to meet next week.  Plan: (Homework, other):  Patient was encouraged to continue to seek condition-related information and education.      Scheduled a Phone follow up appointment with SARINA MCWILLIAMS in 1 week     Patient has set self-identified goals and will monitor progress until the next appointment on: 10/15/2020.     PIPE Levin, Social Work Care Coordinator       PIPE Levin  Social Work Care Coordinator  [facility]  [phone] option 2 or [phone]

## 2020-10-15 ENCOUNTER — TELEPHONE (OUTPATIENT)
Dept: BEHAVIORAL HEALTH | Facility: OTHER | Age: 40
End: 2020-10-15

## 2020-10-19 ENCOUNTER — TELEPHONE (OUTPATIENT)
Dept: BEHAVIORAL HEALTH | Facility: OTHER | Age: 40
End: 2020-10-19

## 2020-10-20 NOTE — TELEPHONE ENCOUNTER
[facility]  @FLOW([id number])@      Social Work Care Navigator Note      Patient: Milla Bose  Date: October 19, 2020  Preferred Name: Milla    Previous PHQ-9:   PHQ-9 SCORE 3/15/2018 9/19/2018 5/18/2020   PHQ-9 Total Score - - -   PHQ-9 Total Score 9 10 24     Previous BANDAR-7:   BANDAR-7 SCORE 2/13/2018 9/19/2018 5/18/2020   Total Score 7 20 18     BRIELLE LEVEL:  No flowsheet data found.    Preferred Contact: @TU([id number])@  Type of Contact Today: Phone call (patient / identified key support person reached)      Data: (subjective / Objective):  Patient Goals Areas: @FLOW([id number])@  Patient Stated Goals: @FLOW([id number])@  Recent ED/IP Admission or Discharge?   None  Recent ED/IP Admission or Discharge?   None    Patient Goals:  Goal Areas: Social and Interpersonal Relationships  Patient stated goals: Reunification with my kids        [facility] Core Service Provided:  Comprehensive Care Management: utilized the electronic medical record / patient registry to identify and support patient's health conditions / needs more effectively   Care Coordination: provided care management services/referrals necessary to ensure patient and their identified supports have access to medical, behavioral health, pharmacology and recovery support services.  Ensured that patient's care is integrated across all settings and services.   Health and Wellness Promotion    Current Stressors / Issues / Care Plan Objective Addressed Today:  CPS    Intervention:  Motivational Interviewing: Expressed Empathy/Understanding, Supported Autonomy, Collaboration, Evocation, Permission to raise concern or advise and Open-ended questions   Target Behavior(s): NA    Assessment: (Progress on Goals / Homework):  Met with pt via phone. She advised she has been having issues with phone calls coming through and was calling her phone provider today.  Attempted to contact Mary, CPS worker, for weekly meetings but she was unavailable. Mary stated  via text that weekly meetings would move to every other week as things are going very well.  Reminded pt that writer needs a copy of her DA as soon as possible.  Pt stated she would be working with Darwin Gauthier to obtain.     Will remain available.    Plan: (Homework, other):  Patient was encouraged to continue to seek condition-related information and education.      Scheduled a Phone follow up appointment with SARINA MCWILLIAMS in 1 week     Patient has set self-identified goals and will monitor progress until the next appointment.   PIPE Levin, Social Work Care Coordinator     PIPE Levin  Social Work Care Coordinator  [facility]  [phone] option 2 or [phone]

## 2020-10-21 ENCOUNTER — TELEPHONE (OUTPATIENT)
Dept: BEHAVIORAL HEALTH | Facility: OTHER | Age: 40
End: 2020-10-21

## 2020-10-22 ENCOUNTER — VIRTUAL VISIT (OUTPATIENT)
Dept: BEHAVIORAL HEALTH | Facility: OTHER | Age: 40
End: 2020-10-22
Attending: FAMILY MEDICINE
Payer: MEDICARE

## 2020-10-22 DIAGNOSIS — R69 DIAGNOSIS DEFERRED: Primary | ICD-10-CM

## 2020-10-23 NOTE — PROGRESS NOTES
[facility]         Social Work Care Navigator Note      Patient: Milla Bose  Date: October 23, 2020  Preferred Name: Milla    Previous PHQ-9:   PHQ-9 SCORE 3/15/2018 9/19/2018 5/18/2020   PHQ-9 Total Score - - -   PHQ-9 Total Score 9 10 24     Previous BANDAR-7:   BANDAR-7 SCORE 2/13/2018 9/19/2018 5/18/2020   Total Score 7 20 18     BRIELLE LEVEL:  No flowsheet data found.    Preferred Contact: @[facility]([id number])@  Type of Contact Today: Phone call (patient / identified key support person reached)      Data: (subjective / Objective):  Patient Goals Areas:    Patient Stated Goals:    Recent ED/IP Admission or Discharge?   None  Recent ED/IP Admission or Discharge?   None    Patient Goals:  Goal Areas: Social and Interpersonal Relationships  Patient stated goals: Reunification with my kids        [facility] Core Service Provided:  Comprehensive Care Management: utilized the electronic medical record / patient registry to identify and support patient's health conditions / needs more effectively   Care Coordination: provided care management services/referrals necessary to ensure patient and their identified supports have access to medical, behavioral health, pharmacology and recovery support services.  Ensured that patient's care is integrated across all settings and services.   Health and Wellness Promotion  Individual and Family Support: aimed to help clients reduce barriers to achieving goals, increase health literacy and knowledge about chronic condition(s), increase self-efficacy skills, and improve health outcomes    Current Stressors / Issues / Care Plan Objective Addressed Today:  CPS visit    Intervention:  Motivational Interviewing: Expressed Empathy/Understanding, Supported Autonomy, Collaboration, Evocation, Permission to raise concern or advise and Open-ended questions   Target Behavior(s): NA    Assessment: (Progress on Goals / Homework):  Writer met with pt and CPS worker.  Pt advised that her  , Seth, had tested positive for covid and inquired what this would mean for her case/visits.  Mary advised she would cancel supervised visitation at [facility] for 2 weeks.  Next family visit was scheduled for 10/31 which will be kept pending no symptoms from pt.    Mary did give pt permission to start having phone calls unsupervised with her boys.  Pt expressed gratitude for this.    WIll plan to meet again in two weeks.    Plan: (Homework, other):  Patient was encouraged to continue to seek condition-related information and education.      Scheduled a Phone follow up appointment with SARINA MCWILLIAMS in 1 week     Patient has set self-identified goals and will monitor progress until the next appointment.   PIPE Levin, Social Work Care Coordinator     PIPE Levin  Social Work Care Coordinator  [facility]  [phone] option 2 or [phone]

## 2020-10-26 NOTE — TELEPHONE ENCOUNTER
[facility]  @FLOW([id number])@      Social Work Care Navigator Note      Patient: Milla Bose  Date: October 26, 2020  Preferred Name: Milla    Previous PHQ-9:   PHQ-9 SCORE 3/15/2018 9/19/2018 5/18/2020   PHQ-9 Total Score - - -   PHQ-9 Total Score 9 10 24     Previous BANDAR-7:   BANDAR-7 SCORE 2/13/2018 9/19/2018 5/18/2020   Total Score 7 20 18     BRIELLE LEVEL:  No flowsheet data found.    Preferred Contact: @TU([id number])@  Type of Contact Today: Phone call (patient / identified key support person reached)      Data: (subjective / Objective):  Patient Goals Areas: @FLOW([id number])@  Patient Stated Goals: @FLOW([id number])@  Recent ED/IP Admission or Discharge?   None  Recent ED/IP Admission or Discharge?   None    Patient Goals:  Goal Areas: Social and Interpersonal Relationships  Patient stated goals: Reunification with my kids        [facility] Core Service Provided:  Comprehensive Care Management: utilized the electronic medical record / patient registry to identify and support patient's health conditions / needs more effectively   Care Coordination: provided care management services/referrals necessary to ensure patient and their identified supports have access to medical, behavioral health, pharmacology and recovery support services.  Ensured that patient's care is integrated across all settings and services.   Health and Wellness Promotion    Current Stressors / Issues / Care Plan Objective Addressed Today:  housing    Intervention:  Motivational Interviewing: Expressed Empathy/Understanding, Supported Autonomy, Collaboration, Evocation, Permission to raise concern or advise and Open-ended questions   Target Behavior(s): NA    Assessment: (Progress on Goals / Homework):  Pt reached out to writer asking for assistance with housing near the [facility] by her kids.  She advised she would like to move there ASA. Offered to have pt come in to complete housing applications, but pt stated she would be unable  to for 2 weeks due to covid quarantine as her spouse has covid.  Advised her to reach out when quarantine is over and we would schedule appt at that time.  She thanked writer for the assistance.    Plan: (Homework, other):  Patient was encouraged to continue to seek condition-related information and education.      Scheduled a Phone follow up appointment with SARINA MCWILLIAMS in 1 week     Patient has set self-identified goals and will monitor progress until the next appointment .   PIPE Levin, Social Work Care Coordinator     PIPE Levin  Social Work Care Coordinator  [facility]  [phone] option 2 or [phone]

## 2020-10-29 ENCOUNTER — TELEPHONE (OUTPATIENT)
Dept: BEHAVIORAL HEALTH | Facility: OTHER | Age: 40
End: 2020-10-29

## 2020-10-30 NOTE — TELEPHONE ENCOUNTER
[facility]  @FLOW([id number])@      Social Work Care Navigator Note      Patient: Milla Bose  Date: October 29, 2020  Preferred Name: Milla    Previous PHQ-9:   PHQ-9 SCORE 3/15/2018 9/19/2018 5/18/2020   PHQ-9 Total Score - - -   PHQ-9 Total Score 9 10 24     Previous BANDAR-7:   BANDAR-7 SCORE 2/13/2018 9/19/2018 5/18/2020   Total Score 7 20 18     BRIELLE LEVEL:  No flowsheet data found.    Preferred Contact: @TU([id number])@  Type of Contact Today: Phone call (patient / identified key support person reached)      Data: (subjective / Objective):  Patient Goals Areas: @FLOW([id number])@  Patient Stated Goals: @FLOW([id number])@  Recent ED/IP Admission or Discharge?   None  Recent ED/IP Admission or Discharge?   None    Patient Goals:  Goal Areas: Social and Interpersonal Relationships  Patient stated goals: Reunification with my kids        [facility] Core Service Provided:  Comprehensive Care Management: utilized the electronic medical record / patient registry to identify and support patient's health conditions / needs more effectively   Care Coordination: provided care management services/referrals necessary to ensure patient and their identified supports have access to medical, behavioral health, pharmacology and recovery support services.  Ensured that patient's care is integrated across all settings and services.   Health and Wellness Promotion    Current Stressors / Issues / Care Plan Objective Addressed Today:  CPS plan update    Intervention:  Motivational Interviewing: Expressed Empathy/Understanding, Supported Autonomy, Collaboration, Evocation, Permission to raise concern or advise and Open-ended questions   Target Behavior(s): NA    Assessment: (Progress on Goals / Homework):  Pt contacted writer and CPS worker.  She advised she did have some mild symptoms related to covid and that her sister (kid's foster mom) had tested positive.  Pt was advised this weekend's visit would be cancelled.    Pt  asked if there would be any way she could go down to help take care of the kids when they were ill.  After looking at options, pt was given approval to go down for 1 week.   Pt was extremely grateful and writer will remain available.    Plan: (Homework, other):  Patient was encouraged to continue to seek condition-related information and education.      Scheduled a Phone follow up appointment with SARINA MCWILLIAMS in 1 week     Patient has set self-identified goals and will monitor progress until the next appointment.   PIPE Levin, Social Work Care Coordinator     PIPE Levin  Social Work Care Coordinator  [facility]  [phone] option 2 or [phone]

## 2020-11-05 ENCOUNTER — VIRTUAL VISIT (OUTPATIENT)
Dept: BEHAVIORAL HEALTH | Facility: OTHER | Age: 40
End: 2020-11-05
Attending: FAMILY MEDICINE
Payer: MEDICARE

## 2020-11-05 DIAGNOSIS — R69 DIAGNOSIS DEFERRED: Primary | ICD-10-CM

## 2020-11-06 NOTE — PROGRESS NOTES
[facility]         Social Work Care Navigator Note      Patient: Milla Bose  Date: November 6, 2020  Preferred Name: Milla    Previous PHQ-9:   PHQ-9 SCORE 3/15/2018 9/19/2018 5/18/2020   PHQ-9 Total Score - - -   PHQ-9 Total Score 9 10 24     Previous BANDAR-7:   BANDAR-7 SCORE 2/13/2018 9/19/2018 5/18/2020   Total Score 7 20 18     BRIELLE LEVEL:  No flowsheet data found.    Preferred Contact: @[facility]([phone])@  Type of Contact Today: Phone call (patient / identified key support person reached)      Data: (subjective / Objective):  Patient Goals Areas:    Patient Stated Goals:    Recent ED/IP Admission or Discharge?   None  Recent ED/IP Admission or Discharge?   None    Patient Goals:  Goal Areas: Social and Interpersonal Relationships  Patient stated goals: Reunification with my kids        [facility] Core Service Provided:  Comprehensive Care Management: utilized the electronic medical record / patient registry to identify and support patient's health conditions / needs more effectively   Care Coordination: provided care management services/referrals necessary to ensure patient and their identified supports have access to medical, behavioral health, pharmacology and recovery support services.  Ensured that patient's care is integrated across all settings and services.   Health and Wellness Promotion    Current Stressors / Issues / Care Plan Objective Addressed Today:  CPS status    Intervention:  Motivational Interviewing: Expressed Empathy/Understanding, Supported Autonomy, Collaboration, Evocation, Permission to raise concern or advise and Open-ended questions   Target Behavior(s): NA    Assessment: (Progress on Goals / Homework):  Met with pt and her CPS worker Mary.  THings are going very well.  Pt has had the opportunity to be with her kids in the [facility] to assist in caring for them while the family has covid over the past week which has gone well.  THey are also working to get the next visits  arranged with CPS.      She denied needing any assistance at this time and writer will remain available.    Plan: (Homework, other):  Patient was encouraged to continue to seek condition-related information and education.      Scheduled a Phone follow up appointment with SARINA MCWILLIAMS in 1 week     Patient has set self-identified goals and will monitor progress until the next appointment.   PIPE Levin, Social Work Care Coordinator       The author of this note documented a reason for not sharing it with the patient.      PIPE Levin  Social Work Care Coordinator  [facility]  [phone] option 2 or [phone]

## 2020-11-17 ENCOUNTER — NURSE TRIAGE (OUTPATIENT)
Dept: FAMILY MEDICINE | Facility: OTHER | Age: 40
End: 2020-11-17

## 2020-11-17 DIAGNOSIS — Z20.822 SUSPECTED 2019 NOVEL CORONAVIRUS INFECTION: Primary | ICD-10-CM

## 2020-11-17 NOTE — TELEPHONE ENCOUNTER
Reason for Disposition    [1] COVID-19 infection suspected by caller or triager AND [2] mild symptoms (cough, fever, or others) AND [3] no complications or SOB    Additional Information    Negative: [1] COVID-19 exposure AND [2] no symptoms    Negative: COVID-19 and Breastfeeding, questions about    Negative: [1] Adult with possible COVID-19 symptoms AND [2] triager concerned about severity of symptoms or other causes    Negative: SEVERE difficulty breathing (e.g., struggling for each breath, speaks in single words)    Negative: Difficult to awaken or acting confused (e.g., disoriented, slurred speech)    Negative: Bluish (or gray) lips or face now    Negative: Shock suspected (e.g., cold/pale/clammy skin, too weak to stand, low BP, rapid pulse)    Negative: Sounds like a life-threatening emergency to the triager    Negative: SEVERE or constant chest pain or pressure (Exception: mild central chest pain, present only when coughing)    Negative: MODERATE difficulty breathing (e.g., speaks in phrases, SOB even at rest, pulse 100-120)    Negative: Patient sounds very sick or weak to the triager    Negative: MILD difficulty breathing (e.g., minimal/no SOB at rest, SOB with walking, pulse <100)    Negative: Chest pain or pressure    Negative: Fever > 103 F (39.4 C)    Negative: [1] Fever > 101 F (38.3 C) AND [2] age > 60    Negative: [1] Fever > 100.0 F (37.8 C) AND [2] bedridden (e.g., nursing home patient, CVA, chronic illness, recovering from surgery)    Negative: HIGH RISK patient (e.g., age > 64 years, diabetes, heart or lung disease, weak immune system) (Exception: Has already been evaluated by healthcare provider and has no new or worsening symptoms)    Negative: Fever present > 3 days (72 hours)    Negative: [1] Fever returns after gone for over 24 hours AND [2] symptoms worse or not improved    Negative: [1] Continuous (nonstop) coughing interferes with work or school AND [2] no improvement using cough  "treatment per protocol    Answer Assessment - Initial Assessment Questions  1. COVID-19 DIAGNOSIS: \"Who made your Coronavirus (COVID-19) diagnosis?\" \"Was it confirmed by a positive lab test?\" If not diagnosed by a HCP, ask \"Are there lots of cases (community spread) where you live?\" (See [facility] health department website, if unsure)      Not diagnosed  2. ONSET: \"When did the COVID-19 symptoms start?\"       11/15  3. WORST SYMPTOM: \"What is your worst symptom?\" (e.g., cough, fever, shortness of breath, muscle aches)      Headache, fatigue  4. COUGH: \"Do you have a cough?\" If so, ask: \"How bad is the cough?\"        Yes, mild  5. FEVER: \"Do you have a fever?\" If so, ask: \"What is your temperature, how was it measured, and when did it start?\"      No  6. RESPIRATORY STATUS: \"Describe your breathing?\" (e.g., shortness of breath, wheezing, unable to speak)       Harder to breathe than usual  7. BETTER-SAME-WORSE: \"Are you getting better, staying the same or getting worse compared to yesterday?\"  If getting worse, ask, \"In what way?\"      better  8. HIGH RISK DISEASE: \"Do you have any chronic medical problems?\" (e.g., asthma, heart or lung disease, weak immune system, etc.)      No  9. PREGNANCY: \"Is there any chance you are pregnant?\" \"When was your last menstrual period?\"      No  10. OTHER SYMPTOMS: \"Do you have any other symptoms?\"  (e.g., chills, fatigue, headache, loss of smell or taste, muscle pain, sore throat)        Headache, muscle pain, chills, fatigue    Protocols used: CORONAVIRUS (COVID-19) DIAGNOSED OR [id number] 8.4.20      "

## 2020-11-18 ENCOUNTER — OFFICE VISIT (OUTPATIENT)
Dept: FAMILY MEDICINE | Facility: OTHER | Age: 40
End: 2020-11-18
Attending: FAMILY MEDICINE
Payer: MEDICARE

## 2020-11-18 DIAGNOSIS — Z20.822 SUSPECTED 2019 NOVEL CORONAVIRUS INFECTION: ICD-10-CM

## 2020-11-18 PROCEDURE — U0003 INFECTIOUS AGENT DETECTION BY NUCLEIC ACID (DNA OR RNA); SEVERE ACUTE RESPIRATORY SYNDROME CORONAVIRUS 2 (SARS-COV-2) (CORONAVIRUS DISEASE [COVID-19]), AMPLIFIED PROBE TECHNIQUE, MAKING USE OF HIGH THROUGHPUT TECHNOLOGIES AS DESCRIBED BY CMS-2020-01-R: HCPCS | Mod: ZL | Performed by: FAMILY MEDICINE

## 2020-11-19 ENCOUNTER — TELEPHONE (OUTPATIENT)
Dept: BEHAVIORAL HEALTH | Facility: OTHER | Age: 40
End: 2020-11-19

## 2020-11-20 LAB
SARS-COV-2 RNA SPEC QL NAA+PROBE: NOT DETECTED
SPECIMEN SOURCE: NORMAL

## 2020-11-27 NOTE — TELEPHONE ENCOUNTER
[facility]  @FLOW([id number])@      Social Work Care Navigator Note      Patient: Milla Bose  Date: November 27, 2020  Preferred Name: Milla    Previous PHQ-9:   PHQ-9 SCORE 3/15/2018 9/19/2018 5/18/2020   PHQ-9 Total Score - - -   PHQ-9 Total Score 9 10 24     Previous BANDAR-7:   BANDAR-7 SCORE 2/13/2018 9/19/2018 5/18/2020   Total Score 7 20 18     BRIELLE LEVEL:  No flowsheet data found.    Preferred Contact: @TU([phone])@  Type of Contact Today: Phone call (patient / identified key support person reached)      Data: (subjective / Objective):  Patient Goals Areas: @FLOW([id number])@  Patient Stated Goals: @FLOW([id number])@  Recent ED/IP Admission or Discharge?   None  Recent ED/IP Admission or Discharge?   None    Patient Goals:  Goal Areas: Social and Interpersonal Relationships  Patient stated goals: Reunification with my kids        [facility] Core Service Provided:  Comprehensive Care Management: utilized the electronic medical record / patient registry to identify and support patient's health conditions / needs more effectively   Care Coordination: provided care management services/referrals necessary to ensure patient and their identified supports have access to medical, behavioral health, pharmacology and recovery support services.  Ensured that patient's care is integrated across all settings and services.   Health and Wellness Promotion    Current Stressors / Issues / Care Plan Objective Addressed Today:  DA    Intervention:  Motivational Interviewing: Expressed Empathy/Understanding, Supported Autonomy, Collaboration, Evocation, Permission to raise concern or advise and Open-ended questions   Target Behavior(s): NA    Assessment: (Progress on Goals / Homework):  Writer texted pt to see how things are going.  She stated they have been going well.  She has been able to spend lots of time with her boys to help when family has been recovering from covid which she is very thankful for.  She advised  she had a covid test yesterday and is praying it is negative as then she will be able to go to be with her kids again.    Advised pt writer has not received DA yet.  She advised she would ask Darwin to send it again when she speaks with him today.    *Received update that Darwin sent DA - checked and writer did receive fax.  Forwarded to FRANCO Valerio for DA review and sent a copy to scanning.     Plan: (Homework, other):  Patient was encouraged to continue to seek condition-related information and education.      Scheduled a Phone follow up appointment with SARINA MCWILLIAMS in 1 week     Patient has set self-identified goals and will monitor progress until the next appointment.   PIPE Levin, Social Work Care Coordinator       PIPE Levin  Social Work Care Coordinator  [facility]  [phone] option 2 or [phone]

## 2020-12-03 ENCOUNTER — DOCUMENTATION ONLY (OUTPATIENT)
Dept: BEHAVIORAL HEALTH | Facility: OTHER | Age: 40
End: 2020-12-03
Payer: MEDICARE

## 2020-12-03 ENCOUNTER — TELEPHONE (OUTPATIENT)
Dept: BEHAVIORAL HEALTH | Facility: OTHER | Age: 40
End: 2020-12-03

## 2020-12-03 NOTE — PROGRESS NOTES
"[facility] Diagnostic Assessment Review    PATIENT'S NAME: Milla Bose  MRN:   [id number]  :   1980  DATE OF Review December 3, 2020    Date of Diagnostic Assessment:  2020  Mental Health Provider and Clinic: [facility]; seen by Darwin Gauthier   A Release of Information was been obtained - see chart    DSM5 Diagnoses: A copy of DA is in file  Behavioral and Medical Diagnosis: \"PTSD, BANDAR, ADHD, and Depression, Recurrent, Moderate\"     Significant Functional Status Documented:  Yes      Did the Diagnostic Assessment include:   Pyschocontextual Factors: Yes    WHODAS Score: No   Cage-AID score is:  Based on Cage-Aid score and clinical interview there are not indications of drug or alcohol abuse.  If not, [facility] CM or C will complete at next face to face   Chemical dependency recommendations: No indications of CD issues    C RECOMMENDATIONS/PLAN: Utilize [facility] supportive services, Establish therapeutic relationship with C or other therapist, Take medications as prescribed, Recommendation for [facility].      Iman Craft, [facility]             The author of this note documented a reason for not sharing it with the patient.      "

## 2020-12-08 NOTE — TELEPHONE ENCOUNTER
[facility]  @FLOW([id number])@      Social Work Care Navigator Note      Patient: Milla Bose  Date: December 8, 2020  Preferred Name: Milla    Previous PHQ-9:   PHQ-9 SCORE 3/15/2018 9/19/2018 5/18/2020   PHQ-9 Total Score - - -   PHQ-9 Total Score 9 10 24     Previous BANDAR-7:   BANDAR-7 SCORE 2/13/2018 9/19/2018 5/18/2020   Total Score 7 20 18     BRIELLE LEVEL:  No flowsheet data found.    Preferred Contact: @TU([id number])@  Type of Contact Today: Phone call (patient / identified key support person reached)      Data: (subjective / Objective):  Patient Goals Areas: @FLOW([id number])@  Patient Stated Goals: @FLOW([id number])@  Recent ED/IP Admission or Discharge?   None  Recent ED/IP Admission or Discharge?   None    Patient Goals:  Goal Areas: Social and Interpersonal Relationships  Patient stated goals: Reunification with my kids        [facility] Core Service Provided:  Comprehensive Care Management: utilized the electronic medical record / patient registry to identify and support patient's health conditions / needs more effectively   Care Coordination: provided care management services/referrals necessary to ensure patient and their identified supports have access to medical, behavioral health, pharmacology and recovery support services.  Ensured that patient's care is integrated across all settings and services.   Health and Wellness Promotion    Current Stressors / Issues / Care Plan Objective Addressed Today:  NA    Intervention:  Motivational Interviewing: Expressed Empathy/Understanding, Supported Autonomy, Collaboration, Evocation, Permission to raise concern or advise and Open-ended questions   Target Behavior(s): NA    Assessment: (Progress on Goals / Homework):  Writer checked in with pt to see how things are going.  She stated things are going very well.  She has been able [place] the [place]  at her sister's place to be more involved with her kids.  Her and her , Seth,  broke up so that she could focus on herself.  She is no longer looking for housing down there as she is happy staying where she is.  She denied any other concerns.  Writer will remain available.    Plan: (Homework, other):  Patient was encouraged to continue to seek condition-related information and education.      Scheduled a Phone follow up appointment with SARINA MCWILLIAMS in 1 week     Patient has set self-identified goals and will monitor progress until the next appointment.   PIPE Levin, Social Work Care Coordinator       Referrals/other:NA  [unfilled]      PIPE Levin  Social Work Care Coordinator  [facility]  [phone] option 2 or [phone]

## 2020-12-10 ENCOUNTER — E-VISIT (OUTPATIENT)
Dept: FAMILY MEDICINE | Facility: OTHER | Age: 40
End: 2020-12-10
Payer: MEDICARE

## 2020-12-10 DIAGNOSIS — Z71.6 ENCOUNTER FOR TOBACCO USE CESSATION COUNSELING: Primary | ICD-10-CM

## 2020-12-10 PROCEDURE — 99421 OL DIG E/M SVC 5-10 MIN: CPT | Performed by: FAMILY MEDICINE

## 2020-12-10 NOTE — TELEPHONE ENCOUNTER
Provider E-Visit time total (minutes): 5    ELECTRONIC VISIT DOCUMENTATION:    SUBJECTIVE:  Note above accurately captures the substance of my conversation with the patient.    ASSESSMENT/ PLAN:  1. Encounter for tobacco use cessation counseling  Nicotrol inhaler prescribed, follow-up if not helpful.  - nicotine (NICOTROL) 10 MG inhaler; Use 1 cartridge as needed for urge to smoke by puffing over course of 20min.  Use 6-16 cart/day; reduce number of cart/day over 6-12 weeks.  Dispense: 168 each; Refill: 2      Bonny Valdes MD

## 2020-12-11 ENCOUNTER — TELEPHONE (OUTPATIENT)
Dept: FAMILY MEDICINE | Facility: OTHER | Age: 40
End: 2020-12-11

## 2020-12-11 DIAGNOSIS — Z71.6 ENCOUNTER FOR TOBACCO USE CESSATION COUNSELING: Primary | ICD-10-CM

## 2020-12-11 NOTE — TELEPHONE ENCOUNTER
Unfortunately, the inhaler is not covered by her insurance.  I don't know if we still refer to programs like [employer], but sometimes those programs can help patients get these types of medications at lower cost.  She could also check [facility]

## 2020-12-11 NOTE — TELEPHONE ENCOUNTER
Fax from the pharmacy stating the Nicotrol oral inhaler is not covered by patients plan and the preferred alternatives are Nicotrol NS    Bupropopion HCL SR, or Chantix    Please advise

## 2020-12-15 ENCOUNTER — TELEPHONE (OUTPATIENT)
Dept: FAMILY MEDICINE | Facility: OTHER | Age: 40
End: 2020-12-15

## 2020-12-15 NOTE — TELEPHONE ENCOUNTER
Prior Authorization Retail Medication Request  [facility] KEY# [id number]    Medication/Dose: NICOTROL 10MG INHALERS  ICD code (if different than what is on RX):    Previously Tried and Failed:    Rationale:      Insurance Name:    Insurance ID:        Pharmacy Information (if different than what is on RX)  Name:    Phone:

## 2020-12-15 NOTE — TELEPHONE ENCOUNTER
PRIOR AUTHORIZATION DENIED    Medication: NICOTROL 10MG INHALERS - DENIED    Denial Date: 12/15/2020    Denial Rational: PATIENT NEEDS TO TRY/FAIL PREFERRED ALTERNATIVES - SEE BELOW      Appeal Information:

## 2020-12-15 NOTE — TELEPHONE ENCOUNTER
Central Prior Authorization Team   Phone: [phone]      PA Initiation    Medication: NICOTROL 10MG INHALERS - INITIATED  Insurance Company: [employer] - Phone [phone] Fax [phone]  Pharmacy Filling the Rx: [id number] - [place], [place] - [address] AT [facility] [place] [address]  Filling Pharmacy Phone: [phone]  Filling Pharmacy Fax: [phone]  Start Date: 12/15/2020

## 2020-12-17 RX ORDER — VARENICLINE TARTRATE 1 MG/1
1 TABLET, FILM COATED ORAL 2 TIMES DAILY
Qty: 56 TABLET | Refills: 1 | Status: SHIPPED | OUTPATIENT
Start: 2020-12-17

## 2020-12-18 ENCOUNTER — TELEPHONE (OUTPATIENT)
Dept: BEHAVIORAL HEALTH | Facility: OTHER | Age: 40
End: 2020-12-18

## 2020-12-18 NOTE — TELEPHONE ENCOUNTER
[facility]  @FLOW([id number])@      Social Work Care Navigator Note      Patient: Milla Bose  Date: December 18, 2020  Preferred Name: Milla    Previous PHQ-9:   PHQ-9 SCORE 3/15/2018 9/19/2018 5/18/2020   PHQ-9 Total Score - - -   PHQ-9 Total Score 9 10 24     Previous BANDAR-7:   BANDAR-7 SCORE 2/13/2018 9/19/2018 5/18/2020   Total Score 7 20 18     BRIELLE LEVEL:  No flowsheet data found.    Preferred Contact: @TU([id number])@  Type of Contact Today: Phone call (patient / identified key support person reached)      Data: (subjective / Objective):  Patient Goals Areas: @FLOW([id number])@  Patient Stated Goals: @FLOW([id number])@  Recent ED/IP Admission or Discharge?   None  Recent ED/IP Admission or Discharge?   None    Patient Goals:  Goal Areas: Social and Interpersonal Relationships  Patient stated goals: Reunification with my kids        [facility] Core Service Provided:  Comprehensive Care Management: utilized the electronic medical record / patient registry to identify and support patient's health conditions / needs more effectively   Care Coordination: provided care management services/referrals necessary to ensure patient and their identified supports have access to medical, behavioral health, pharmacology and recovery support services.  Ensured that patient's care is integrated across all settings and services.   Health and Wellness Promotion    Current Stressors / Issues / Care Plan Objective Addressed Today:  NA    Intervention:  Motivational Interviewing: Expressed Empathy/Understanding, Supported Autonomy, Collaboration, Evocation, Permission to raise concern or advise and Open-ended questions   Target Behavior(s): NA    Assessment: (Progress on Goals / Homework):  Writer contacted pt to see how things are going. She stated they have been good and is driving back to [place] today.  She advised she would contact writer on Monday to discuss how things are going.    Plan: (Homework, other):  Patient  was encouraged to continue to seek condition-related information and education.      Scheduled a Phone follow up appointment with SARINA MCWILLIAMS in 1 week     Patient has set self-identified goals and will monitor progress until the next appointment.    PIPE Levin, Social Work Care Coordinator       Referrals/other:KRISTEN  [unfilled]      PIPE Levin  Social Work Care Coordinator  [facility]  [phone] option 2 or [phone]

## 2021-01-05 ENCOUNTER — TELEPHONE (OUTPATIENT)
Dept: BEHAVIORAL HEALTH | Facility: OTHER | Age: 41
End: 2021-01-05

## 2021-01-05 NOTE — TELEPHONE ENCOUNTER
[facility]  @FLOW([id number])@      Social Work Care Navigator Note      Patient: Milla Bose  Date: January 5, 2021  Preferred Name: Milla    Previous PHQ-9:   PHQ-9 SCORE 3/15/2018 9/19/2018 5/18/2020   PHQ-9 Total Score - - -   PHQ-9 Total Score 9 10 24     Previous BANDAR-7:   BANDAR-7 SCORE 2/13/2018 9/19/2018 5/18/2020   Total Score 7 20 18     BRIELLE LEVEL:  No flowsheet data found.    Preferred Contact: @TU([id number])@  Type of Contact Today: Phone call (not reached/unavailable)      Data: (subjective / Objective):  Patient Goals Areas: @FLOW([id number])@  Patient Stated Goals: @FLOW([id number])@  Recent ED/IP Admission or Discharge?   None  Attempted to reach patient, but was unsuccessful.  Plan to attempt again.  PIPE Levin      Referrals/other:NA  [unfilled]      PIPE Levin  Social Work Care Coordinator  [facility]  [phone] option 2 or [phone]

## 2021-01-14 ENCOUNTER — TELEPHONE (OUTPATIENT)
Dept: BEHAVIORAL HEALTH | Facility: OTHER | Age: 41
End: 2021-01-14

## 2021-01-19 NOTE — TELEPHONE ENCOUNTER
[facility]  @FLOW([id number])@      Social Work Care Navigator Note      Patient: Milla Bose  Date: January 14, 2021  Preferred Name: Milla    Previous PHQ-9:   PHQ-9 SCORE 3/15/2018 9/19/2018 5/18/2020   PHQ-9 Total Score - - -   PHQ-9 Total Score 9 10 24     Previous BANDAR-7:   BANDAR-7 SCORE 2/13/2018 9/19/2018 5/18/2020   Total Score 7 20 18     BRIELLE LEVEL:  No flowsheet data found.    Preferred Contact: @TU([id number])@  Type of Contact Today: Phone call (not reached/unavailable)      Data: (subjective / Objective):  Patient Goals Areas: @FLOW([id number])@  Patient Stated Goals: @FLOW([id number])@  Recent ED/IP Admission or Discharge?   None  Attempted to reach patient, but was unsuccessful.  Plan to attempt again.  PIPE Levin      Referrals/other:NA  [unfilled]      PIPE Levin  Social Work Care Coordinator  [facility]  [phone] option 2 or [phone]

## 2021-01-26 ENCOUNTER — TELEPHONE (OUTPATIENT)
Dept: BEHAVIORAL HEALTH | Facility: OTHER | Age: 41
End: 2021-01-26

## 2021-02-01 NOTE — TELEPHONE ENCOUNTER
[facility]  @FLOW([id number])@      Social Work Care Navigator Note      Patient: Milla Bose  Date: February 1, 2021  Preferred Name: Milla    Previous PHQ-9:   PHQ-9 SCORE 3/15/2018 9/19/2018 5/18/2020   PHQ-9 Total Score - - -   PHQ-9 Total Score 9 10 24     Previous BANDAR-7:   BANDAR-7 SCORE 2/13/2018 9/19/2018 5/18/2020   Total Score 7 20 18     BRIELLE LEVEL:  No flowsheet data found.    Preferred Contact: @TU([id number])@  Type of Contact Today: Phone call (patient / identified key support person reached)      Data: (subjective / Objective):  Patient Goals Areas: @FLOW([id number])@  Patient Stated Goals: @FLOW([id number])@  Recent ED/IP Admission or Discharge?   None  Recent ED/IP Admission or Discharge?   None    Patient Goals:  Goal Areas: Social and Interpersonal Relationships  Patient stated goals: Reunification with my kids        [facility] Core Service Provided:  Comprehensive Care Management: utilized the electronic medical record / patient registry to identify and support patient's health conditions / needs more effectively   Care Coordination: provided care management services/referrals necessary to ensure patient and their identified supports have access to medical, behavioral health, pharmacology and recovery support services.  Ensured that patient's care is integrated across all settings and services.   Health and Wellness Promotion    Current Stressors / Issues / Care Plan Objective Addressed Today:  NA    Intervention:  Motivational Interviewing: Expressed Empathy/Understanding, Supported Autonomy, Collaboration, Evocation, Permission to raise concern or advise and Open-ended questions   Target Behavior(s): NA    Assessment: (Progress on Goals / Homework):  Writer reached out to pt to see how things were going.  She stated things were going great.  She has found a new apartment in the [facility] and is moving in Feb 1st.  She will also have her first home study with the boys at that time and is  excited for that opportunity.  She updated writer of a new phone number and writer updated this in her chart.      She is unsure what her long term plan will be as far as staying there or moving back up here, seda garnett has Dr. Javier as PCP.  Advised pt if she plans to stay in the [facility] we will disenroll at that time from [facility] and she stated understanding and that she will keep writer updated.  WIll continue to remain available.    Plan: (Homework, other):  Patient was encouraged to continue to seek condition-related information and education.      Scheduled a Phone follow up appointment with SARINA MCWILLIAMS in 2 weeks     Patient has set self-identified goals and will monitor progress until the next appointment.     PIPE Levin, Social Work Care Coordinator       Referrals/other:KRISTEN  [unfilled]    PIPE Levin  Social Work Care Coordinator  [facility]  [phone] option 2 or [phone]

## 2021-02-02 ENCOUNTER — TELEPHONE (OUTPATIENT)
Dept: BEHAVIORAL HEALTH | Facility: OTHER | Age: 41
End: 2021-02-02

## 2021-02-09 NOTE — TELEPHONE ENCOUNTER
[facility]  @FLOW([id number])@      Social Work Care Navigator Note      Patient: Milla Bose  Date: February 9, 2021  Preferred Name: Milla    Previous PHQ-9:   PHQ-9 SCORE 3/15/2018 9/19/2018 5/18/2020   PHQ-9 Total Score - - -   PHQ-9 Total Score 9 10 24     Previous BANDAR-7:   BANDAR-7 SCORE 2/13/2018 9/19/2018 5/18/2020   Total Score 7 20 18     BRIELLE LEVEL:  No flowsheet data found.    Preferred Contact: @TU([id number])@  Type of Contact Today: Phone call (patient / identified key support person reached)      Data: (subjective / Objective):  Patient Goals Areas: @FLOW([id number])@  Patient Stated Goals: @FLOW([id number])@  Recent ED/IP Admission or Discharge?   None  Recent ED/IP Admission or Discharge?   None    Patient Goals:  Goal Areas: Social and Interpersonal Relationships  Patient stated goals: Reunification with my kids        [facility] Core Service Provided:  Comprehensive Care Management: utilized the electronic medical record / patient registry to identify and support patient's health conditions / needs more effectively   Care Transitions: focused on the coordinated and seamless movement of patient between or within different levels of care or settings  Care Coordination: provided care management services/referrals necessary to ensure patient and their identified supports have access to medical, behavioral health, pharmacology and recovery support services.  Ensured that patient's care is integrated across all settings and services.   Health and Wellness Promotion    Current Stressors / Issues / Care Plan Objective Addressed Today:  CPS    Intervention:  Motivational Interviewing: Expressed Empathy/Understanding, Supported Autonomy, Collaboration, Evocation, Permission to raise concern or advise and Open-ended questions   Target Behavior(s): NA    Assessment: (Progress on Goals / Homework):  Writer reached out to pt to see how her move had gone this past weekend.  She stated things had gone  well.  She is liking her new place and is extremely excited for her boys first extended home study which starts Friday, 2/5.  She is also [place]  She advised she will reach out to writer with any concerns.      Will continue to remain available.    Plan: (Homework, other):  Patient was encouraged to continue to seek condition-related information and education.      Scheduled a Phone follow up appointment with SARINA MCWILLIAMS in 1 week     Patient has set self-identified goals and will monitor progress until the next appointment.   PIPE Levin, Social Work Care Coordinator     Referrals/other:NA  @REASON FORNOTSHARING@    PIPE Levin  Social Work Care Coordinator  [facility]  [phone] option 2 or [phone]

## 2021-02-22 ENCOUNTER — TELEPHONE (OUTPATIENT)
Dept: BEHAVIORAL HEALTH | Facility: OTHER | Age: 41
End: 2021-02-22

## 2021-03-05 ENCOUNTER — TELEPHONE (OUTPATIENT)
Dept: BEHAVIORAL HEALTH | Facility: OTHER | Age: 41
End: 2021-03-05

## 2021-03-05 NOTE — TELEPHONE ENCOUNTER
[facility]  @FLOW([id number])@      Social Work Care Navigator Note      Patient: Milla Bose  Date: February 22, 2021  Preferred Name: Milla    Previous PHQ-9:   PHQ-9 SCORE 3/15/2018 9/19/2018 5/18/2020   PHQ-9 Total Score - - -   PHQ-9 Total Score 9 10 24     Previous BANDAR-7:   BANDAR-7 SCORE 2/13/2018 9/19/2018 5/18/2020   Total Score 7 20 18     BRIELLE LEVEL:  No flowsheet data found.    Preferred Contact: @TU([id number])@  Type of Contact Today: Phone call (patient / identified key support person reached)      Data: (subjective / Objective):  Patient Goals Areas: @FLOW([id number])@  Patient Stated Goals: @FLOW([id number])@  Recent ED/IP Admission or Discharge?   None  Recent ED/IP Admission or Discharge?   None    Patient Goals:  Goal Areas: Social and Interpersonal Relationships  Patient stated goals: Reunification with my kids        [facility] Core Service Provided:  Comprehensive Care Management: utilized the electronic medical record / patient registry to identify and support patient's health conditions / needs more effectively   Care Coordination: provided care management services/referrals necessary to ensure patient and their identified supports have access to medical, behavioral health, pharmacology and recovery support services.  Ensured that patient's care is integrated across all settings and services.   Health and Wellness Promotion  Individual and Family Support: aimed to help clients reduce barriers to achieving goals, increase health literacy and knowledge about chronic condition(s), increase self-efficacy skills, and improve health outcomes    Current Stressors / Issues / Care Plan Objective Addressed Today:  NA    Intervention:  Motivational Interviewing: Expressed Empathy/Understanding, Supported Autonomy, Collaboration, Evocation, Permission to raise concern or advise and Open-ended questions   Target Behavior(s): NA    Assessment: (Progress on Goals / Homework):  Writer received text  from pt.  She stated she has had boys on trial home visit since Feb 5.  She advised it is going very well.  She was concerned as boys are on s[place] they were in foster care; however, would like to know how to switch this back when the boys are in her care.  Advised writer would look into this and get back to pt.    Writer LVM with Mary Cps worker, and will remain available.    Plan: (Homework, other):  Patient was encouraged to continue to seek condition-related information and education.      Scheduled a Phone follow up appointment with SARINA MCWILLIAMS in 1 week     Patient has set self-identified goals and will monitor progress until the next appointment.    PIPE Levin, Social Work Care Coordinator         Referrals/other:KRISTEN  [unfilled]    PIPE Levin  Social Work Care Coordinator  [facility]  [phone] option 2 or [phone]

## 2021-03-07 NOTE — TELEPHONE ENCOUNTER
[facility]  @FLOW([id number])@      Social Work Care Navigator Note      Patient: Milla Bose  Date: March 5, 2021  Preferred Name: Milla    Previous PHQ-9:   PHQ-9 SCORE 3/15/2018 9/19/2018 5/18/2020   PHQ-9 Total Score - - -   PHQ-9 Total Score 9 10 24     Previous BANDAR-7:   BANDAR-7 SCORE 2/13/2018 9/19/2018 5/18/2020   Total Score 7 20 18     BRIELLE LEVEL:  No flowsheet data found.    Preferred Contact: @UT([id number])@  Type of Contact Today: Phone call (patient / identified key support person reached)      Data: (subjective / Objective):  Patient Goals Areas: @FLOW([id number])@  Patient Stated Goals: @FLOW([id number])@  Recent ED/IP Admission or Discharge?   None  Recent ED/IP Admission or Discharge?   None    Patient Goals:  Goal Areas: Social and Interpersonal Relationships  Patient stated goals: Reunification with my kids        [facility] Core Service Provided:  Comprehensive Care Management: utilized the electronic medical record / patient registry to identify and support patient's health conditions / needs more effectively   Care Coordination: provided care management services/referrals necessary to ensure patient and their identified supports have access to medical, behavioral health, pharmacology and recovery support services.  Ensured that patient's care is integrated across all settings and services.   Health and Wellness Promotion  Individual and Family Support: aimed to help clients reduce barriers to achieving goals, increase health literacy and knowledge about chronic condition(s), increase self-efficacy skills, and improve health outcomes    Current Stressors / Issues / Care Plan Objective Addressed Today:  ssi    Intervention:  Motivational Interviewing: Expressed Empathy/Understanding, Supported Autonomy, Collaboration, Evocation, Permission to raise concern or advise and Open-ended questions   Target Behavior(s): NA    Assessment: (Progress on Goals / Homework):  Writer had left another  VM with Mary, CPS worker on 2/26, but did not receive response.  Contacted [facility] security office and explained situation.  They advised that pt would need to complete a payee change form which would then be approved by the [facility].  They advised pt should call to request this form.      Updated pt with sandra [facility] security office phone # and advised of the form that was needed.  Advised if [facility] did not approve change she would need to work with Mary on this.     Pt stated things are going very well.  They had another [facility] conference and recovery is being made.  Will continue to remain available.  Plan: (Homework, other):  Patient was encouraged to continue to seek condition-related information and education.      Scheduled a Phone follow up appointment with SARINA MCWILLIAMS in 2 weeks     Patient has set self-identified goals and will monitor progress until the next appointment.    PIPE Levin, Social Work Care Coordinator       Referrals/other:KRISTEN  [unfilled]      PIPE Levin  Social Work Care Coordinator  [facility]  [phone] option 2 or [phone]

## 2021-04-13 ENCOUNTER — TELEPHONE (OUTPATIENT)
Dept: BEHAVIORAL HEALTH | Facility: OTHER | Age: 41
End: 2021-04-13

## 2021-04-19 NOTE — TELEPHONE ENCOUNTER
[facility]  @FLOW([id number])@      Social Work Care Navigator Note      Patient: Milla Bose  Date: April 13, 2021  Preferred Name: Milla    Previous PHQ-9:   PHQ-9 SCORE 3/15/2018 9/19/2018 5/18/2020   PHQ-9 Total Score - - -   PHQ-9 Total Score 9 10 24     Previous BANDAR-7:   BANDAR-7 SCORE 2/13/2018 9/19/2018 5/18/2020   Total Score 7 20 18     BRIELLE LEVEL:  No flowsheet data found.    Preferred Contact: @TU([id number])@  Type of Contact Today: Phone call (patient / identified key support person reached)      Data: (subjective / Objective):  Patient Goals Areas: @FLOW([id number])@  Patient Stated Goals: @FLOW([id number])@  Recent ED/IP Admission or Discharge?   None  Recent ED/IP Admission or Discharge?   None    Patient Goals:  Goal Areas: Chemical Health;Social and Interpersonal Relationships  Patient stated goals: I will continue to maintain my sobriety, I will continue reunification process with my kids        [facility] Core Service Provided:  Comprehensive Care Management: utilized the electronic medical record / patient registry to identify and support patient's health conditions / needs more effectively     Current Stressors / Issues / Care Plan Objective Addressed Today:  Brain balance    Intervention:  Motivational Interviewing: Expressed Empathy/Understanding, Supported Autonomy, Collaboration, Evocation, Permission to raise concern or advise and Open-ended questions   Target Behavior(s): NA    Assessment: (Progress on Goals / Homework):  Writer contacted pt to see how things were going.  Pt advised she was looking at the brain balance program (for kids with sensory processing disorder), but was concerned about the cost.  She advised it is not covered by insurance and she would be unable to afford out of pocket.  She asked writer to look into available grants.    Writer contacted brain balance who advised they charge $140 per hr and an average program treatment plan is 36-72 hrs.  They advised  they could go as low as $100, but they would not have any other grants available.  They did advise that if a pt is on a county waiver or other support program they can often help offset the cost.    Updated pt to speak with Mary, cps worker. Advised if she was unsure to contact writer and we could contact [employer] assessment [place] together for additional options.  Will continue to remain available.    Plan: (Homework, other):  Patient was encouraged to continue to seek condition-related information and education.      Scheduled a Phone follow up appointment with SARINA MCWILLIAMS in 3 weeks     Patient has set self-identified goals and will monitor progress until the next appointment.   PIPE Levin, Social Work Care Coordinator       Referrals/other:NA  [unfilled]    PIPE Levin  Social Work Care Coordinator  [facility]  [phone] option 2 or [phone]

## 2021-05-04 ENCOUNTER — TELEPHONE (OUTPATIENT)
Dept: BEHAVIORAL HEALTH | Facility: OTHER | Age: 41
End: 2021-05-04

## 2021-05-04 NOTE — TELEPHONE ENCOUNTER
[facility]  @FLOW([id number])@      Social Work Care Navigator Note      Patient: Milla Bose  Date: May 4, 2021  Preferred Name: Milla    Previous PHQ-9:   PHQ-9 SCORE 3/15/2018 9/19/2018 5/18/2020   PHQ-9 Total Score - - -   PHQ-9 Total Score 9 10 24     Previous BANDAR-7:   BANDAR-7 SCORE 2/13/2018 9/19/2018 5/18/2020   Total Score 7 20 18     BRIELLE LEVEL:  No flowsheet data found.    Preferred Contact: @TU([id number])@  Type of Contact Today: Phone call (not reached/unavailable)      Data: (subjective / Objective):  Patient Goals Areas: @FLOW([id number])@  Patient Stated Goals: @FLOW([id number])@  Recent ED/IP Admission or Discharge?   None  Attempted to reach patient, but was unsuccessful.  Plan to attempt again.  PIPE Levin      Referrals/other:NA  [unfilled]    PIPE Levin  Social Work Care Coordinator  [facility]  [phone] option 2 or [phone]

## 2021-05-18 ENCOUNTER — TELEPHONE (OUTPATIENT)
Dept: BEHAVIORAL HEALTH | Facility: OTHER | Age: 41
End: 2021-05-18

## 2021-05-18 NOTE — TELEPHONE ENCOUNTER
[facility]  @FLOW([id number])@      Social Work Care Navigator Note      Patient: Milla Bose  Date: May 18, 2021  Preferred Name: Milla    Previous PHQ-9:   PHQ-9 SCORE 3/15/2018 9/19/2018 5/18/2020   PHQ-9 Total Score - - -   PHQ-9 Total Score 9 10 24     Previous BANDAR-7:   BANDAR-7 SCORE 2/13/2018 9/19/2018 5/18/2020   Total Score 7 20 18     BRIELLE LEVEL:  No flowsheet data found.    Preferred Contact: @TU([id number])@  Type of Contact Today: Phone call (not reached/unavailable)      Data: (subjective / Objective):  Patient Goals Areas: @FLOW([id number])@  Patient Stated Goals: @FLOW([id number])@  Recent ED/IP Admission or Discharge?   None  Attempted to reach patient, but was unsuccessful.  Plan to attempt again.  PIPE Levin      Referrals/other:NA  [unfilled]    PIPE Levin  Social Work Care Coordinator  [facility]  [phone] option 2 or [phone]

## 2021-05-25 ENCOUNTER — TELEPHONE (OUTPATIENT)
Dept: BEHAVIORAL HEALTH | Facility: OTHER | Age: 41
End: 2021-05-25

## 2021-05-25 NOTE — TELEPHONE ENCOUNTER
[facility]  @FLOW([id number])@      Social Work Care Navigator Note      Patient: Milla Bose  Date: May 26, 2021  Preferred Name: Milla    Previous PHQ-9:   PHQ-9 SCORE 3/15/2018 9/19/2018 5/18/2020   PHQ-9 Total Score - - -   PHQ-9 Total Score 9 10 24     Previous BANDAR-7:   BANDAR-7 SCORE 2/13/2018 9/19/2018 5/18/2020   Total Score 7 20 18     BRIELLE LEVEL:  No flowsheet data found.    Preferred Contact: @TU([id number])@  Type of Contact Today: Phone call (patient / identified key support person reached)      Data: (subjective / Objective):  Patient Goals Areas: @FLOW([id number])@  Patient Stated Goals: @FLOW([id number])@  Recent ED/IP Admission or Discharge?   None  Recent ED/IP Admission or Discharge?   None    Patient Goals:  Goal Areas: Chemical Health;Social and Interpersonal Relationships  Patient stated goals: I will continue to maintain my sobriety, I will continue reunification process with my kids        [facility] Core Service Provided:  Comprehensive Care Management: utilized the electronic medical record / patient registry to identify and support patient's health conditions / needs more effectively   Care Coordination: provided care management services/referrals necessary to ensure patient and their identified supports have access to medical, behavioral health, pharmacology and recovery support services.  Ensured that patient's care is integrated across all settings and services.   Health and Wellness Promotion    Current Stressors / Issues / Care Plan Objective Addressed Today:  NA    Intervention:  Motivational Interviewing: Expressed Empathy/Understanding, Supported Autonomy, Collaboration, Evocation, Permission to raise concern or advise and Open-ended questions   Target Behavior(s): NA    Assessment: (Progress on Goals / Homework):  Writer contacted pt to see how things were going.  Pt stated she has been really busy, but things were going well.  She denied any additional questions at this  time.      Will continue to remain available.    Plan: (Homework, other):  Patient was encouraged to continue to seek condition-related information and education.      Scheduled a Phone follow up appointment with SARINA MCWILLIAMS in 2 weeks     Patient has set self-identified goals and will monitor progress until the next appointment.   PIPE Levin, Social Work Care Coordinator       Referrals/other:NA  [unfilled]    PIPE Levin  Social Work Care Coordinator  [facility]  [phone] option 2 or [phone]

## 2021-06-14 ENCOUNTER — TELEPHONE (OUTPATIENT)
Dept: BEHAVIORAL HEALTH | Facility: OTHER | Age: 41
End: 2021-06-14

## 2021-06-14 NOTE — TELEPHONE ENCOUNTER
[facility]  @[email or web address]([id number])@      Social Work Care Navigator Note      Patient: Milla Bose  Date: June 14, 2021  Preferred Name: Milla    Previous PHQ-9:   PHQ-9 SCORE 3/15/2018 9/19/2018 5/18/2020   PHQ-9 Total Score - - -   PHQ-9 Total Score 9 10 24     Previous BANDAR-7:   BANDAR-7 SCORE 2/13/2018 9/19/2018 5/18/2020   Total Score 7 20 18     BRIELLE LEVEL:  No flowsheet data found.    Preferred Contact: @TU([id number])@  Type of Contact Today: Phone call (not reached/unavailable)      Data: (subjective / Objective):  Patient Goals Areas: @FLOW([id number])@  Patient Stated Goals: @FLOW([id number])@  Recent ED/IP Admission or Discharge?   None  Attempted to reach patient, but was unsuccessful.  Plan to attempt again.  PIPE Levin      Referrals/other:NA  [unfilled]    PIPE Levin  Social Work Care Coordinator  [facility]  [phone] option 2 or [phone]

## 2021-06-22 ENCOUNTER — TELEPHONE (OUTPATIENT)
Dept: BEHAVIORAL HEALTH | Facility: OTHER | Age: 41
End: 2021-06-22

## 2021-06-22 NOTE — TELEPHONE ENCOUNTER
[facility]  @[email or web address]([id number])@      Social Work Care Navigator Note      Patient: Milla Bose  Date: June 22, 2021  Preferred Name: Milla    Previous PHQ-9:   PHQ-9 SCORE 3/15/2018 9/19/2018 5/18/2020   PHQ-9 Total Score - - -   PHQ-9 Total Score 9 10 24     Previous BANDAR-7:   BANDAR-7 SCORE 2/13/2018 9/19/2018 5/18/2020   Total Score 7 20 18     BRIELLE LEVEL:  No flowsheet data found.    Preferred Contact: @TU([id number]@  Type of Contact Today: Phone call (patient / identified key support person reached)      Data: (subjective / Objective):  Patient Goals Areas: @FLOW([id number])@  Patient Stated Goals: @FLOW([id number])@  Recent ED/IP Admission or Discharge?   None  Writer contacted pt to see how things are going.  Pt stated things are going very well.  She officially received her kids back through the courts and her CPS case has now been dismissed.  She is still living in the [facility] and plans to stay there for family support.  She denied any other concerns at this time and would like to disenroll from [facility] at this time.  Will plan to disenroll today's date.    Referrals/other:NA  [unfilled]    PIPE Levin  Social Work Care Coordinator  [facility]  [phone] option 2 or [phone]

## 2021-06-24 ENCOUNTER — TELEPHONE (OUTPATIENT)
Dept: FAMILY MEDICINE | Facility: OTHER | Age: 41
End: 2021-06-24

## 2023-09-09 NOTE — PROGRESS NOTES
Milla Bose is a 39 year old female who presents to clinic today for the following health issues:      Concern - Dental pain  Onset: 2 months (on and off for 4 years)    Description:   Constant and sharp    Intensity: severe    Progression of Symptoms:  intermittent    Accompanying Signs & Symptoms:  headaches    Previous history of similar problem:   On and off for 4 years    Precipitating factors:   Worsened by: eating harder foods     Alleviating factors:  Improved by: not eating   Therapies Tried and outcome: ibuprofen, tylenol      Upcoming dental surgery 9/29/19  1 wisdom tooth, 3 molars          Patient Active Problem List   Diagnosis     Schizoaffective disorder (H)     PTSD (post-traumatic stress disorder)     Anxiety state     Panic disorder with agoraphobia     Astigmatism     Major depression     Myopia     Personality, multiple (H)     Congenital cataract     Past Surgical History:   Procedure Laterality Date     cyst removal left breast       cyst removed from right ear       GYN SURGERY      laser of cervix       Social History     Tobacco Use     Smoking status: Current Every Day Smoker     Packs/day: 0.50     Years: 20.00     Pack years: 10.00     Types: Cigarettes     Smokeless tobacco: Never Used   Substance Use Topics     Alcohol use: Yes     Comment: rare     Family History   Problem Relation Age of Onset     Heart Disease Mother      Alcohol/Drug Mother      Unknown/Adopted Father      Cancer Paternal Grandmother         Cervical         Current Outpatient Medications   Medication Sig Dispense Refill     Acetaminophen (TYLENOL PO) Take 1,000 mg by mouth every 6 hours as needed for mild pain or fever       ALPRAZolam (XANAX PO) Take 1 mg by mouth 3 times daily        amphetamine-dextroamphetamine (ADDERALL XR) 20 MG 24 hr capsule Take 20 mg by mouth daily       amphetamine-dextroamphetamine (ADDERALL) 20 MG tablet Take 20 mg by mouth 2 times daily       GABAPENTIN PO Take 400 mg by mouth 3  times daily        ibuprofen (ADVIL/MOTRIN) 800 MG tablet TAKE 1 TABLET(800 MG) BY MOUTH EVERY 8 HOURS AS NEEDED FOR MODERATE PAIN 100 tablet 0     levonorgestrel (MIRENA) 20 MCG/24HR IUD 1 each (20 mcg) by Intrauterine route continuous       Allergies   Allergen Reactions     Codeine GI Disturbance     Recent Labs   Lab Test 01/04/18  1622 09/10/15  1101   ALT  --  66*   TSH 2.82  --       BP Readings from Last 3 Encounters:   08/07/19 122/66   01/19/19 122/86   09/27/18 112/68    Wt Readings from Last 3 Encounters:   08/07/19 58.5 kg (129 lb)   09/19/18 59 kg (130 lb)   03/15/18 61.7 kg (136 lb)               Reviewed and updated as needed this visit by Provider         Review of Systems   ROS COMP: Constitutional, HEENT, cardiovascular, pulmonary, gi and gu systems are negative, except as otherwise noted.      Objective    /66 (BP Location: Left arm, Patient Position: Sitting, Cuff Size: Adult Regular)   Pulse 98   Temp 97.7  F (36.5  C) (Tympanic)   Resp 18   Wt 58.5 kg (129 lb)   SpO2 99%   BMI 24.37 kg/m    Body mass index is 24.37 kg/m .       Physical Exam   GENERAL: healthy, alert and no distress  EYES: Eyes grossly normal to inspection, PERRL and conjunctivae and sclerae normal  HENT: Poor dentition - left upper wisdom tooth is carious, right upper molar is carious, right and left low molars are carious and broken off at the gumline.  RESP: lungs clear to auscultation - no rales, rhonchi or wheezes  CV: regular rate and rhythm, normal S1 S2, no S3 or S4, no murmur, click or rub, no peripheral edema and peripheral pulses strong            Assessment & Plan     1. Pain, dental  - traMADol (ULTRAM) 50 MG tablet; Take 1 tablet (50 mg) by mouth every 6 hours as needed for severe pain  Dispense: 30 tablet; Refill: 0  - Ibuprofen PRN  - Warm salt water rinses through the day  - Keep your dental surgery appointment as scheduled      To ER or UC as needed  See PCP for Follow-up and also you are due for  a physical      Tobacco Cessation:   reports that she has been smoking cigarettes.  She has a 10.00 pack-year smoking history. She has never used smokeless tobacco.          Heather Bustamante NP  [facility]       [facility]